# Patient Record
Sex: FEMALE | Race: WHITE | NOT HISPANIC OR LATINO | Employment: FULL TIME | URBAN - METROPOLITAN AREA
[De-identification: names, ages, dates, MRNs, and addresses within clinical notes are randomized per-mention and may not be internally consistent; named-entity substitution may affect disease eponyms.]

---

## 2017-01-06 ENCOUNTER — LAB VISIT (OUTPATIENT)
Dept: LAB | Facility: HOSPITAL | Age: 67
End: 2017-01-06
Attending: FAMILY MEDICINE
Payer: MEDICARE

## 2017-01-06 ENCOUNTER — OFFICE VISIT (OUTPATIENT)
Dept: FAMILY MEDICINE | Facility: CLINIC | Age: 67
End: 2017-01-06
Payer: MEDICARE

## 2017-01-06 ENCOUNTER — TELEPHONE (OUTPATIENT)
Dept: FAMILY MEDICINE | Facility: CLINIC | Age: 67
End: 2017-01-06

## 2017-01-06 VITALS
RESPIRATION RATE: 14 BRPM | HEART RATE: 76 BPM | HEIGHT: 62 IN | BODY MASS INDEX: 35.78 KG/M2 | SYSTOLIC BLOOD PRESSURE: 128 MMHG | WEIGHT: 194.44 LBS | DIASTOLIC BLOOD PRESSURE: 80 MMHG

## 2017-01-06 DIAGNOSIS — I10 ESSENTIAL HYPERTENSION: Primary | ICD-10-CM

## 2017-01-06 DIAGNOSIS — Z12.31 ENCOUNTER FOR SCREENING MAMMOGRAM FOR MALIGNANT NEOPLASM OF BREAST: ICD-10-CM

## 2017-01-06 DIAGNOSIS — I10 ESSENTIAL HYPERTENSION: ICD-10-CM

## 2017-01-06 DIAGNOSIS — Z11.59 NEED FOR HEPATITIS C SCREENING TEST: ICD-10-CM

## 2017-01-06 DIAGNOSIS — Z12.39 BREAST CANCER SCREENING: ICD-10-CM

## 2017-01-06 DIAGNOSIS — M89.9 DISORDER OF BONE: ICD-10-CM

## 2017-01-06 DIAGNOSIS — E78.5 HYPERLIPIDEMIA, UNSPECIFIED HYPERLIPIDEMIA TYPE: ICD-10-CM

## 2017-01-06 DIAGNOSIS — Z12.11 COLON CANCER SCREENING: ICD-10-CM

## 2017-01-06 DIAGNOSIS — Z13.820 OSTEOPOROSIS SCREENING: ICD-10-CM

## 2017-01-06 LAB
ALBUMIN SERPL BCP-MCNC: 4.4 G/DL
ALP SERPL-CCNC: 71 U/L
ALT SERPL W/O P-5'-P-CCNC: 22 U/L
ANION GAP SERPL CALC-SCNC: 9 MMOL/L
AST SERPL-CCNC: 24 U/L
BILIRUB SERPL-MCNC: 0.7 MG/DL
BUN SERPL-MCNC: 17 MG/DL
CALCIUM SERPL-MCNC: 10.3 MG/DL
CHLORIDE SERPL-SCNC: 105 MMOL/L
CHOLEST/HDLC SERPL: 2.2 {RATIO}
CO2 SERPL-SCNC: 28 MMOL/L
CREAT SERPL-MCNC: 1 MG/DL
EST. GFR  (AFRICAN AMERICAN): >60 ML/MIN/1.73 M^2
EST. GFR  (NON AFRICAN AMERICAN): 58.8 ML/MIN/1.73 M^2
GLUCOSE SERPL-MCNC: 94 MG/DL
HDL/CHOLESTEROL RATIO: 45 %
HDLC SERPL-MCNC: 191 MG/DL
HDLC SERPL-MCNC: 86 MG/DL
LDLC SERPL CALC-MCNC: 75.2 MG/DL
NONHDLC SERPL-MCNC: 105 MG/DL
POTASSIUM SERPL-SCNC: 3.9 MMOL/L
PROT SERPL-MCNC: 8.4 G/DL
SODIUM SERPL-SCNC: 142 MMOL/L
TRIGL SERPL-MCNC: 149 MG/DL
TSH SERPL DL<=0.005 MIU/L-ACNC: 3.04 UIU/ML

## 2017-01-06 PROCEDURE — 99999 PR PBB SHADOW E&M-EST. PATIENT-LVL III: CPT | Mod: PBBFAC,,, | Performed by: FAMILY MEDICINE

## 2017-01-06 PROCEDURE — 1160F RVW MEDS BY RX/DR IN RCRD: CPT | Mod: S$GLB,,, | Performed by: FAMILY MEDICINE

## 2017-01-06 PROCEDURE — 80061 LIPID PANEL: CPT

## 2017-01-06 PROCEDURE — 1157F ADVNC CARE PLAN IN RCRD: CPT | Mod: S$GLB,,, | Performed by: FAMILY MEDICINE

## 2017-01-06 PROCEDURE — 99214 OFFICE O/P EST MOD 30 MIN: CPT | Mod: S$GLB,,, | Performed by: FAMILY MEDICINE

## 2017-01-06 PROCEDURE — 36415 COLL VENOUS BLD VENIPUNCTURE: CPT | Mod: PO

## 2017-01-06 PROCEDURE — 3079F DIAST BP 80-89 MM HG: CPT | Mod: S$GLB,,, | Performed by: FAMILY MEDICINE

## 2017-01-06 PROCEDURE — 1126F AMNT PAIN NOTED NONE PRSNT: CPT | Mod: S$GLB,,, | Performed by: FAMILY MEDICINE

## 2017-01-06 PROCEDURE — 3074F SYST BP LT 130 MM HG: CPT | Mod: S$GLB,,, | Performed by: FAMILY MEDICINE

## 2017-01-06 PROCEDURE — 90662 IIV NO PRSV INCREASED AG IM: CPT | Mod: S$GLB,,, | Performed by: INTERNAL MEDICINE

## 2017-01-06 PROCEDURE — 84443 ASSAY THYROID STIM HORMONE: CPT

## 2017-01-06 PROCEDURE — 80053 COMPREHEN METABOLIC PANEL: CPT

## 2017-01-06 PROCEDURE — 1159F MED LIST DOCD IN RCRD: CPT | Mod: S$GLB,,, | Performed by: FAMILY MEDICINE

## 2017-01-06 PROCEDURE — G0008 ADMIN INFLUENZA VIRUS VAC: HCPCS | Mod: S$GLB,,, | Performed by: INTERNAL MEDICINE

## 2017-01-06 RX ORDER — LOSARTAN POTASSIUM 100 MG/1
100 TABLET ORAL DAILY
Qty: 90 TABLET | Refills: 3 | Status: SHIPPED | OUTPATIENT
Start: 2017-01-06 | End: 2018-01-12 | Stop reason: SDUPTHER

## 2017-01-06 RX ORDER — HYDROCHLOROTHIAZIDE 12.5 MG/1
12.5 CAPSULE ORAL DAILY
Qty: 90 CAPSULE | Refills: 3 | Status: SHIPPED | OUTPATIENT
Start: 2017-01-06 | End: 2018-01-12 | Stop reason: SDUPTHER

## 2017-01-06 RX ORDER — ATORVASTATIN CALCIUM 40 MG/1
40 TABLET, FILM COATED ORAL NIGHTLY
Qty: 90 TABLET | Refills: 3 | Status: SHIPPED | OUTPATIENT
Start: 2017-01-06 | End: 2017-01-13 | Stop reason: SDUPTHER

## 2017-01-06 RX ORDER — AMLODIPINE BESYLATE 5 MG/1
5 TABLET ORAL DAILY
Qty: 90 TABLET | Refills: 3 | Status: SHIPPED | OUTPATIENT
Start: 2017-01-06 | End: 2017-01-13 | Stop reason: SDUPTHER

## 2017-01-06 RX ORDER — TRAZODONE HYDROCHLORIDE 50 MG/1
50 TABLET ORAL NIGHTLY
Qty: 90 TABLET | Refills: 3 | Status: SHIPPED | OUTPATIENT
Start: 2017-01-06 | End: 2018-01-12 | Stop reason: SDUPTHER

## 2017-01-06 NOTE — MR AVS SNAPSHOT
[place] [facility] [address] [address] [place] [address]  Phone: [phone]  Fax: [phone]                  Katy Yarbrough   2017 8:00 AM   Office Visit    Description:  Female : 1950   Provider:  Bill Garvey MD   Department:  [facility]           Reason for Visit     Hyperlipidemia           Diagnoses this Visit        Comments    Essential hypertension    -  Primary     Hyperlipidemia, unspecified hyperlipidemia type         Breast cancer screening         Osteoporosis screening         Disorder of bone         Encounter for screening mammogram for malignant neoplasm of breast         Colon cancer screening                To Do List           Future Appointments        Provider Department Dept Phone    2017 9:10 AM [facility] [place] [facility] [phone]      Goals (5 Years of Data)     None       These Medications        Disp Refills Start End    atorvastatin (LIPITOR) 40 MG tablet 90 tablet 3 2017     Take 1 tablet (40 mg total) by mouth every evening. - Oral    Pharmacy: [facility] - [address] Ph #: [phone]       hydrochlorothiazide (MICROZIDE) 12.5 mg capsule 90 capsule 3 2017     Take 1 capsule (12.5 mg total) by mouth once daily. - Oral    Pharmacy: [facility] - [place] [id number] [facility] Ph #: [phone]       losartan (COZAAR) 100 MG tablet 90 tablet 3 2017     Take 1 tablet (100 mg total) by mouth once daily. - Oral    Pharmacy: [facility] - [facility] [address] [facility] Ph #: [phone]       trazodone (DESYREL) 50 MG tablet 90 tablet 3 2017     Take 1 tablet (50 mg total) by mouth every evening. - Oral    Pharmacy: [facility] - [facility] [address] [facility] Ph #: [phone]       amlodipine (NORVASC) 5 MG tablet 90 tablet 3 2017     Take 1 tablet (5 mg total)  by mouth once daily. - Oral    Pharmacy: [facility] - [facility] [address]  Ph #: [phone]         [facility]     [facility] On Call Nurse Care Line - 24/7 Assistance  Registered nurses in the [facility] On Call Center provide clinical advisement, health education, appointment booking, and other advisory services.  Call for this free service at [phone].             Medications           Message regarding Medications     Verify the changes and/or additions to your medication regime listed below are the same as discussed with your clinician today.  If any of these changes or additions are incorrect, please notify your healthcare provider.        CHANGE how you are taking these medications     Start Taking Instead of    hydrochlorothiazide (MICROZIDE) 12.5 mg capsule hydrochlorothiazide (MICROZIDE) 12.5 mg capsule    Dosage:  Take 1 capsule (12.5 mg total) by mouth once daily. Dosage:  TAKE 1 CAPSULE EVERY DAY    Reason for Change:  Reorder     losartan (COZAAR) 100 MG tablet losartan (COZAAR) 100 MG tablet    Dosage:  Take 1 tablet (100 mg total) by mouth once daily. Dosage:  TAKE 1 TABLET BY MOUTH DAILY    Reason for Change:  Reorder     trazodone (DESYREL) 50 MG tablet trazodone (DESYREL) 50 MG tablet    Dosage:  Take 1 tablet (50 mg total) by mouth every evening. Dosage:  TAKE 1 TABLET BY MOUTH EVERY EVENING    Reason for Change:  Reorder            Verify that the below list of medications is an accurate representation of the medications you are currently taking.  If none reported, the list may be blank. If incorrect, please contact your healthcare provider. Carry this list with you in case of emergency.           Current Medications     amlodipine (NORVASC) 5 MG tablet Take 1 tablet (5 mg total) by mouth once daily.    ascorbic acid (VITAMIN C) 1000 MG tablet Take 1,000 mg by mouth once daily.    atorvastatin (LIPITOR) 40 MG tablet Take 1 tablet (40 mg total) by mouth  "every evening.    hydrochlorothiazide (MICROZIDE) 12.5 mg capsule Take 1 capsule (12.5 mg total) by mouth once daily.    losartan (COZAAR) 100 MG tablet Take 1 tablet (100 mg total) by mouth once daily.    trazodone (DESYREL) 50 MG tablet Take 1 tablet (50 mg total) by mouth every evening.           Clinical Reference Information           Vital Signs - Last Recorded  Most recent update: 1/6/2017  8:23 AM by Bill Garvey MD    BP Pulse Resp Ht Wt BMI    128/80 76 14 5' 2" (1.575 m) 88.2 kg (194 lb 7.1 oz) 35.56 kg/m2      Blood Pressure          Most Recent Value    BP  128/80 [avg home reading]      Allergies as of 1/6/2017     No Known Allergies      Immunizations Administered on Date of Encounter - 1/6/2017     None      Orders Placed During Today's Visit      Normal Orders This Visit    POCT Hemocult Stool X3     Future Labs/Procedures Expected by Expires    Comprehensive metabolic panel  1/6/2017 1/7/2018    DXA Bone Density Spine And Hip  1/6/2017 1/6/2018    Lipid panel  1/6/2017 1/7/2018    Mammo Digital Screening Bilateral With CAD  1/6/2017 3/6/2018    TSH  1/6/2017 1/7/2018      "

## 2017-01-06 NOTE — PROGRESS NOTES
Subjective:     THIS DOCUMENT WAS MADE IN PART WITH [employer] DICTATION SOFTWARE.  OCCASIONALLY THIS SOFTWARE WILL MISINTERPRET WORDS OR PHRASES.     Patient ID: Katy Yarbrough is a 66 y.o. female.    Chief Complaint: Hyperlipidemia (follow up; needs lab work )    HPI   66-year-old female returns today for routine follow-up.  She has been well and has no complaints or changes.  He should've hypertension she reports at home she averaged less than 130 systolic and typically 80 or below diastolic.  She's had no headaches no chest pain.    She has a history of hyperlipidemia and remains on Lipitor.  No side effects but due for labs.    Active Ambulatory Problems     Diagnosis Date Noted    Hypertension     Hyperlipidemia     URI (upper respiratory infection) 04/25/2015     Resolved Ambulatory Problems     Diagnosis Date Noted    C. difficile diarrhea 01/09/2013     Past Medical History   Diagnosis Date    Anemia     Chronic diarrhea     Over weight      Current Outpatient Prescriptions on File Prior to Visit   Medication Sig Dispense Refill    ascorbic acid (VITAMIN C) 1000 MG tablet Take 1,000 mg by mouth once daily.      [DISCONTINUED] amlodipine (NORVASC) 5 MG tablet Take 1 tablet (5 mg total) by mouth once daily. 30 tablet 1    [DISCONTINUED] atorvastatin (LIPITOR) 40 MG tablet Take 1 tablet (40 mg total) by mouth every evening. 30 tablet 1    [DISCONTINUED] hydrochlorothiazide (MICROZIDE) 12.5 mg capsule TAKE 1 CAPSULE EVERY DAY 90 capsule 1    [DISCONTINUED] losartan (COZAAR) 100 MG tablet TAKE 1 TABLET BY MOUTH DAILY 90 tablet 1    [DISCONTINUED] trazodone (DESYREL) 50 MG tablet TAKE 1 TABLET BY MOUTH EVERY EVENING 30 tablet 1    [DISCONTINUED] hydrochlorothiazide (MICROZIDE) 12.5 mg capsule TAKE 1 CAPSULE EVERY DAY 30 capsule 1    [DISCONTINUED] losartan (COZAAR) 100 MG tablet TAKE 1 TABLET BY MOUTH DAILY 30 tablet 1     No current facility-administered medications on file prior to visit.       Review of patient's allergies indicates:  No Known Allergies  Social History   Substance Use Topics    Smoking status: Never Smoker    Smokeless tobacco: Never Used    Alcohol use None       Review of Systems   Constitutional: Negative for fatigue, fever and unexpected weight change.   HENT: Negative for sinus pressure and trouble swallowing.    Eyes: Negative for pain and visual disturbance.   Respiratory: Negative for cough, chest tightness and shortness of breath.    Cardiovascular: Negative for chest pain, palpitations and leg swelling.   Gastrointestinal: Negative for abdominal pain, blood in stool, constipation, diarrhea, nausea and vomiting.   Genitourinary: Negative for dysuria, frequency and hematuria.   Musculoskeletal: Negative for arthralgias, gait problem, myalgias and neck pain.   Skin: Negative for rash and wound.   Neurological: Positive for dizziness (occasional dizziness when looking up or down quickly). Negative for tremors, syncope, numbness and headaches.   Psychiatric/Behavioral: Negative for dysphoric mood and sleep disturbance. The patient is not nervous/anxious.        Objective:      Physical Exam   Constitutional: She is oriented to person, place, and time. She appears well-developed and well-nourished.  Non-toxic appearance. No distress.   HENT:   Head: Normocephalic and atraumatic.   Right Ear: Tympanic membrane, external ear and ear canal normal.   Left Ear: Tympanic membrane, external ear and ear canal normal.   Nose: Nose normal.   Mouth/Throat: Oropharynx is clear and moist. No oropharyngeal exudate.   Eyes: Conjunctivae and EOM are normal. Pupils are equal, round, and reactive to light. Right eye exhibits no discharge. Left eye exhibits no discharge. No scleral icterus.   Neck: Normal range of motion. Neck supple. No JVD present. No tracheal deviation present. No thyromegaly present.   Cardiovascular: Normal rate, regular rhythm, normal heart sounds and intact distal pulses.   "PMI is not displaced.  Exam reveals no gallop and no friction rub.    No murmur heard.  Pulmonary/Chest: Effort normal and breath sounds normal. No respiratory distress. She has no wheezes. She has no rales.   Abdominal: Soft. Bowel sounds are normal. She exhibits no distension and no mass. There is no tenderness. There is no rebound and no guarding.   Musculoskeletal: She exhibits no edema.   Lymphadenopathy:     She has no cervical adenopathy.   Neurological: She is alert and oriented to person, place, and time. She displays normal reflexes. No cranial nerve deficit. She exhibits normal muscle tone.   Skin: Skin is dry. No rash noted. No pallor.   Psychiatric: She has a normal mood and affect. Her behavior is normal.   Vitals reviewed.      Vitals:    01/06/17 0809 01/06/17 0823   BP: (!) 140/94 128/80   Pulse: 76    Resp: 14    Weight: 88.2 kg (194 lb 7.1 oz)    Height: 5' 2" (1.575 m)        Assessment:       1. Essential hypertension    2. Hyperlipidemia, unspecified hyperlipidemia type    3. Breast cancer screening    4. Osteoporosis screening    5. Disorder of bone     6. Encounter for screening mammogram for malignant neoplasm of breast     7. Colon cancer screening        Plan:       Katy was seen today for hyperlipidemia.    Diagnoses and all orders for this visit:    Essential hypertension  -     Comprehensive metabolic panel; Future  -     TSH; Future  -     hydrochlorothiazide (MICROZIDE) 12.5 mg capsule; Take 1 capsule (12.5 mg total) by mouth once daily.  -     losartan (COZAAR) 100 MG tablet; Take 1 tablet (100 mg total) by mouth once daily.  -     amlodipine (NORVASC) 5 MG tablet; Take 1 tablet (5 mg total) by mouth once daily.    Hyperlipidemia, unspecified hyperlipidemia type  -     TSH; Future  -     Lipid panel; Future  -     atorvastatin (LIPITOR) 40 MG tablet; Take 1 tablet (40 mg total) by mouth every evening.  I have asked her to monitor blood pressure regularly at home to make certain it " is remaining within acceptable limits.    Breast cancer screening  -     Mammo Digital Screening Bilateral With CAD; Future    Osteoporosis screening  -     DXA Bone Density Spine And Hip; Future    Disorder of bone   -     DXA Bone Density Spine And Hip; Future    Encounter for screening mammogram for malignant neoplasm of breast   -     Mammo Digital Screening Bilateral With CAD; Future    Colon cancer screening  -     POCT Hemocult Stool X3    Other orders  -     trazodone (DESYREL) 50 MG tablet; Take 1 tablet (50 mg total) by mouth every evening.

## 2017-01-06 NOTE — TELEPHONE ENCOUNTER
Tried to call mrs salinas she needs to come back to get blood drawn for hepatitis c. No answer.   She had blood drawn this am but did not get the tube drawn for hep C

## 2017-01-11 ENCOUNTER — LAB VISIT (OUTPATIENT)
Dept: LAB | Facility: HOSPITAL | Age: 67
End: 2017-01-11
Attending: FAMILY MEDICINE
Payer: MEDICARE

## 2017-01-11 DIAGNOSIS — Z11.59 NEED FOR HEPATITIS C SCREENING TEST: ICD-10-CM

## 2017-01-11 PROCEDURE — 86803 HEPATITIS C AB TEST: CPT

## 2017-01-11 PROCEDURE — 36415 COLL VENOUS BLD VENIPUNCTURE: CPT | Mod: PO

## 2017-01-12 LAB — HCV AB SERPL QL IA: NEGATIVE

## 2017-01-13 ENCOUNTER — TELEPHONE (OUTPATIENT)
Dept: FAMILY MEDICINE | Facility: CLINIC | Age: 67
End: 2017-01-13

## 2017-01-13 DIAGNOSIS — E78.5 HYPERLIPIDEMIA, UNSPECIFIED HYPERLIPIDEMIA TYPE: ICD-10-CM

## 2017-01-13 DIAGNOSIS — I10 ESSENTIAL HYPERTENSION: ICD-10-CM

## 2017-01-13 RX ORDER — ATORVASTATIN CALCIUM 40 MG/1
40 TABLET, FILM COATED ORAL NIGHTLY
Qty: 10 TABLET | Refills: 1 | Status: SHIPPED | OUTPATIENT
Start: 2017-01-13 | End: 2018-01-12 | Stop reason: SDUPTHER

## 2017-01-13 RX ORDER — AMLODIPINE BESYLATE 5 MG/1
5 TABLET ORAL DAILY
Qty: 10 TABLET | Refills: 1 | Status: SHIPPED | OUTPATIENT
Start: 2017-01-13 | End: 2018-01-12 | Stop reason: SDUPTHER

## 2017-01-13 NOTE — TELEPHONE ENCOUNTER
----- Message from Sean Mejia sent at 1/13/2017  3:18 PM CST -----  Contact: same  Patient called in and stated she was speaking to nurse earlier and forgot to ask them something.    Patient call back number is [phone]

## 2017-01-13 NOTE — TELEPHONE ENCOUNTER
Spoke with pt she only has two pills left of  her meds can we bridge these for her at local pharmacy 10 days worth to [facility].

## 2017-01-13 NOTE — TELEPHONE ENCOUNTER
----- Message from Marichuy Corado sent at 1/13/2017  2:41 PM CST -----  Contact: self [phone]  Patient is requesting a call back from the nurse stated she out of 2 prescriptions.    Please call the patient upon request at phone number [phone].

## 2017-07-21 ENCOUNTER — OFFICE VISIT (OUTPATIENT)
Dept: URGENT CARE | Facility: CLINIC | Age: 67
End: 2017-07-21
Payer: MEDICARE

## 2017-07-21 VITALS
OXYGEN SATURATION: 98 % | HEART RATE: 75 BPM | WEIGHT: 192 LBS | HEIGHT: 62 IN | SYSTOLIC BLOOD PRESSURE: 178 MMHG | TEMPERATURE: 98 F | DIASTOLIC BLOOD PRESSURE: 86 MMHG | RESPIRATION RATE: 17 BRPM | BODY MASS INDEX: 35.33 KG/M2

## 2017-07-21 DIAGNOSIS — S91.051A CAT BITE OF ANKLE, RIGHT, INITIAL ENCOUNTER: Primary | ICD-10-CM
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
## 2017-07-21 PROCEDURE — 99203 OFFICE O/P NEW LOW 30 MIN: CPT | Mod: S$GLB,,, | Performed by: FAMILY MEDICINE

## 2017-07-21 PROCEDURE — 1159F MED LIST DOCD IN RCRD: CPT | Mod: S$GLB,,, | Performed by: FAMILY MEDICINE

## 2017-07-21 RX ORDER — MUPIROCIN 20 MG/G
OINTMENT TOPICAL
Qty: 22 G | Refills: 1 | Status: SHIPPED | OUTPATIENT
Start: 2017-07-21 | End: 2019-02-07

## 2017-07-21 RX ORDER — AMOXICILLIN AND CLAVULANATE POTASSIUM 875; 125 MG/1; MG/1
1 TABLET, FILM COATED ORAL 2 TIMES DAILY
Qty: 20 TABLET | Refills: 0 | Status: SHIPPED | OUTPATIENT
Start: 2017-07-21 | End: 2017-07-31

## 2017-07-21 NOTE — PROGRESS NOTES
"Subjective:       Patient ID: Katy Yarbrough is a 67 y.o. female.    Vitals:  height is 5' 2" (1.575 m) and weight is 87.1 kg (192 lb). Her oral temperature is 98.1 °F (36.7 °C). Her blood pressure is 178/86 (abnormal) and her pulse is 75. Her respiration is 17 and oxygen saturation is 98%.     Chief Complaint: Animal Bite (pt. states her cat bit her last night on her rt. ankle)    Animal Bite    The incident occurred at home. There is an injury to the right ankle. The pain is mild. Pertinent negatives include no chest pain, no numbness, no abdominal pain, no nausea, no vomiting, no headaches and no cough. There have been no prior injuries to these areas. Her tetanus status is UTD.     Review of Systems   Constitution: Negative for chills and fever.   HENT: Negative for headaches and sore throat.    Eyes: Negative for blurred vision.   Cardiovascular: Negative for chest pain.   Respiratory: Negative for cough and shortness of breath.    Skin: Negative for rash.   Musculoskeletal: Negative for back pain and joint pain.   Gastrointestinal: Negative for abdominal pain, diarrhea, nausea and vomiting.   Neurological: Negative for numbness.   Psychiatric/Behavioral: The patient is not nervous/anxious.        Objective:      Physical Exam   Constitutional: She is oriented to person, place, and time. She appears well-developed and well-nourished.   HENT:   Head: Normocephalic and atraumatic. Head is without abrasion, without contusion and without laceration.   Nose: Nose normal.   Mouth/Throat: Oropharynx is clear and moist.   Eyes: Conjunctivae, EOM and lids are normal. Pupils are equal, round, and reactive to light.   Neck: Trachea normal, full passive range of motion without pain and phonation normal. Neck supple.   Cardiovascular: Normal rate.    Pulmonary/Chest: No stridor. No respiratory distress.   Musculoskeletal: Normal range of motion.   Neurological: She is alert and oriented to person, place, and time.   Skin: " Skin is warm, dry and intact. Capillary refill takes less than 2 seconds. No abrasion, no bruising, no burn, no ecchymosis, no laceration, no lesion and no rash noted. There is erythema (bite elvis to right upper ankle. slight erythema).   Psychiatric: She has a normal mood and affect. Her speech is normal and behavior is normal. Judgment and thought content normal. Cognition and memory are normal.   Nursing note and vitals reviewed.      Assessment:       1. Cat bite of ankle, right, initial encounter        Plan:         Cat bite of ankle, right, initial encounter    Other orders  -     amoxicillin-clavulanate 875-125mg (AUGMENTIN) 875-125 mg per tablet; Take 1 tablet by mouth 2 (two) times daily.  Dispense: 20 tablet; Refill: 0  -     mupirocin (BACTROBAN) 2 % ointment; Apply to affected area 3 times daily  Dispense: 22 g; Refill: 1

## 2017-11-10 DIAGNOSIS — Z12.11 COLON CANCER SCREENING: ICD-10-CM

## 2018-01-12 DIAGNOSIS — I10 ESSENTIAL HYPERTENSION: ICD-10-CM

## 2018-01-12 DIAGNOSIS — E78.5 HYPERLIPIDEMIA, UNSPECIFIED HYPERLIPIDEMIA TYPE: ICD-10-CM

## 2018-01-12 RX ORDER — HYDROCHLOROTHIAZIDE 12.5 MG/1
CAPSULE ORAL
Qty: 90 CAPSULE | Refills: 0 | Status: SHIPPED | OUTPATIENT
Start: 2018-01-12 | End: 2018-01-25 | Stop reason: RX

## 2018-01-12 RX ORDER — AMLODIPINE BESYLATE 5 MG/1
TABLET ORAL
Qty: 90 TABLET | Refills: 0 | Status: SHIPPED | OUTPATIENT
Start: 2018-01-12 | End: 2018-01-25 | Stop reason: RX

## 2018-01-12 RX ORDER — TRAZODONE HYDROCHLORIDE 50 MG/1
TABLET ORAL
Qty: 90 TABLET | Refills: 0 | Status: SHIPPED | OUTPATIENT
Start: 2018-01-12 | End: 2018-04-19 | Stop reason: SDUPTHER

## 2018-01-12 RX ORDER — LOSARTAN POTASSIUM 100 MG/1
TABLET ORAL
Qty: 90 TABLET | Refills: 0 | Status: SHIPPED | OUTPATIENT
Start: 2018-01-12 | End: 2018-01-25 | Stop reason: RX

## 2018-01-12 RX ORDER — ATORVASTATIN CALCIUM 40 MG/1
TABLET, FILM COATED ORAL
Qty: 90 TABLET | Refills: 0 | Status: SHIPPED | OUTPATIENT
Start: 2018-01-12 | End: 2018-01-25 | Stop reason: RX

## 2018-01-25 DIAGNOSIS — I10 ESSENTIAL HYPERTENSION: ICD-10-CM

## 2018-01-25 DIAGNOSIS — E78.5 HYPERLIPIDEMIA, UNSPECIFIED HYPERLIPIDEMIA TYPE: ICD-10-CM

## 2018-01-25 RX ORDER — AMLODIPINE BESYLATE 5 MG/1
5 TABLET ORAL DAILY
Qty: 10 TABLET | Refills: 0 | Status: SHIPPED | OUTPATIENT
Start: 2018-01-25 | End: 2018-04-19 | Stop reason: SDUPTHER

## 2018-01-25 RX ORDER — HYDROCHLOROTHIAZIDE 12.5 MG/1
12.5 CAPSULE ORAL DAILY
Qty: 10 CAPSULE | Refills: 0 | Status: SHIPPED | OUTPATIENT
Start: 2018-01-25 | End: 2018-04-19 | Stop reason: SDUPTHER

## 2018-01-25 RX ORDER — LOSARTAN POTASSIUM 100 MG/1
TABLET ORAL
Qty: 10 TABLET | Refills: 0 | Status: SHIPPED | OUTPATIENT
Start: 2018-01-25 | End: 2018-04-19 | Stop reason: SDUPTHER

## 2018-01-25 RX ORDER — ATORVASTATIN CALCIUM 40 MG/1
40 TABLET, FILM COATED ORAL NIGHTLY
Qty: 10 TABLET | Refills: 0 | Status: SHIPPED | OUTPATIENT
Start: 2018-01-25 | End: 2018-04-19 | Stop reason: SDUPTHER

## 2018-01-25 NOTE — TELEPHONE ENCOUNTER
Pts mail order Rx have not arrived, requesting 10 day supply to [facility] pharmacy     LOV: 1/6/2017

## 2018-01-25 NOTE — TELEPHONE ENCOUNTER
----- Message from Rubi Marsh sent at 1/25/2018  8:28 AM CST -----  Contact: self [phone]  Her prescriptions from the mail in pharmacy have not arrived and she is out of Amlodipine 5 mg , Atorvastatin 40 mg, losartan 100 mg and hydrochlorothiazide 12.5 mg. Please order a 10 day supplies of them from:   [facility]/pharmacy #[id number] - [place] - [address] [address]  [address] [place] [address]  Phone: [phone] Fax: [phone]  Thank you!

## 2018-04-19 DIAGNOSIS — E78.5 HYPERLIPIDEMIA, UNSPECIFIED HYPERLIPIDEMIA TYPE: ICD-10-CM

## 2018-04-19 DIAGNOSIS — I10 ESSENTIAL HYPERTENSION: ICD-10-CM

## 2018-04-19 RX ORDER — LOSARTAN POTASSIUM 100 MG/1
TABLET ORAL
Qty: 90 TABLET | Refills: 0 | Status: SHIPPED | OUTPATIENT
Start: 2018-04-19 | End: 2018-07-18 | Stop reason: SDUPTHER

## 2018-04-19 RX ORDER — AMLODIPINE BESYLATE 5 MG/1
TABLET ORAL
Qty: 90 TABLET | Refills: 0 | Status: SHIPPED | OUTPATIENT
Start: 2018-04-19 | End: 2018-07-18 | Stop reason: SDUPTHER

## 2018-04-19 RX ORDER — TRAZODONE HYDROCHLORIDE 50 MG/1
TABLET ORAL
Qty: 90 TABLET | Refills: 0 | Status: SHIPPED | OUTPATIENT
Start: 2018-04-19 | End: 2018-07-18 | Stop reason: SDUPTHER

## 2018-04-19 RX ORDER — ATORVASTATIN CALCIUM 40 MG/1
TABLET, FILM COATED ORAL
Qty: 90 TABLET | Refills: 0 | Status: SHIPPED | OUTPATIENT
Start: 2018-04-19 | End: 2018-07-18 | Stop reason: SDUPTHER

## 2018-04-23 RX ORDER — HYDROCHLOROTHIAZIDE 12.5 MG/1
CAPSULE ORAL
Qty: 90 CAPSULE | Refills: 0 | Status: SHIPPED | OUTPATIENT
Start: 2018-04-23 | End: 2018-07-18 | Stop reason: SDUPTHER

## 2018-05-17 ENCOUNTER — LAB VISIT (OUTPATIENT)
Dept: LAB | Facility: HOSPITAL | Age: 68
End: 2018-05-17
Attending: FAMILY MEDICINE
Payer: MEDICARE

## 2018-05-17 DIAGNOSIS — E78.5 HYPERLIPIDEMIA, UNSPECIFIED HYPERLIPIDEMIA TYPE: ICD-10-CM

## 2018-05-17 DIAGNOSIS — I10 ESSENTIAL HYPERTENSION: ICD-10-CM

## 2018-05-17 LAB
ALBUMIN SERPL BCP-MCNC: 3.7 G/DL
ALP SERPL-CCNC: 62 U/L
ALT SERPL W/O P-5'-P-CCNC: 17 U/L
ANION GAP SERPL CALC-SCNC: 12 MMOL/L
AST SERPL-CCNC: 18 U/L
BASOPHILS # BLD AUTO: 0.03 K/UL
BASOPHILS NFR BLD: 0.4 %
BILIRUB SERPL-MCNC: 0.7 MG/DL
BUN SERPL-MCNC: 16 MG/DL
CALCIUM SERPL-MCNC: 9.7 MG/DL
CHLORIDE SERPL-SCNC: 106 MMOL/L
CHOLEST SERPL-MCNC: 153 MG/DL
CHOLEST/HDLC SERPL: 2.4 {RATIO}
CO2 SERPL-SCNC: 25 MMOL/L
CREAT SERPL-MCNC: 0.8 MG/DL
DIFFERENTIAL METHOD: NORMAL
EOSINOPHIL # BLD AUTO: 0.2 K/UL
EOSINOPHIL NFR BLD: 2.5 %
ERYTHROCYTE [DISTWIDTH] IN BLOOD BY AUTOMATED COUNT: 12.7 %
EST. GFR  (AFRICAN AMERICAN): >60 ML/MIN/1.73 M^2
EST. GFR  (NON AFRICAN AMERICAN): >60 ML/MIN/1.73 M^2
GLUCOSE SERPL-MCNC: 93 MG/DL
HCT VFR BLD AUTO: 43.8 %
HDLC SERPL-MCNC: 64 MG/DL
HDLC SERPL: 41.8 %
HGB BLD-MCNC: 14.6 G/DL
IMM GRANULOCYTES # BLD AUTO: 0.01 K/UL
IMM GRANULOCYTES NFR BLD AUTO: 0.1 %
LDLC SERPL CALC-MCNC: 70.4 MG/DL
LYMPHOCYTES # BLD AUTO: 1.9 K/UL
LYMPHOCYTES NFR BLD: 27.6 %
MCH RBC QN AUTO: 30.7 PG
MCHC RBC AUTO-ENTMCNC: 33.3 G/DL
MCV RBC AUTO: 92 FL
MONOCYTES # BLD AUTO: 0.5 K/UL
MONOCYTES NFR BLD: 7.8 %
NEUTROPHILS # BLD AUTO: 4.3 K/UL
NEUTROPHILS NFR BLD: 61.6 %
NONHDLC SERPL-MCNC: 89 MG/DL
NRBC BLD-RTO: 0 /100 WBC
PLATELET # BLD AUTO: 240 K/UL
PMV BLD AUTO: 11.2 FL
POTASSIUM SERPL-SCNC: 3.4 MMOL/L
PROT SERPL-MCNC: 6.9 G/DL
RBC # BLD AUTO: 4.75 M/UL
SODIUM SERPL-SCNC: 143 MMOL/L
TRIGL SERPL-MCNC: 93 MG/DL
TSH SERPL DL<=0.005 MIU/L-ACNC: 2.38 UIU/ML
WBC # BLD AUTO: 6.92 K/UL

## 2018-05-17 PROCEDURE — 84443 ASSAY THYROID STIM HORMONE: CPT

## 2018-05-17 PROCEDURE — 80061 LIPID PANEL: CPT

## 2018-05-17 PROCEDURE — 36415 COLL VENOUS BLD VENIPUNCTURE: CPT | Mod: PN

## 2018-05-17 PROCEDURE — 85025 COMPLETE CBC W/AUTO DIFF WBC: CPT

## 2018-05-17 PROCEDURE — 80053 COMPREHEN METABOLIC PANEL: CPT

## 2018-05-24 ENCOUNTER — OFFICE VISIT (OUTPATIENT)
Dept: FAMILY MEDICINE | Facility: CLINIC | Age: 68
End: 2018-05-24
Payer: MEDICARE

## 2018-05-24 VITALS
SYSTOLIC BLOOD PRESSURE: 136 MMHG | BODY MASS INDEX: 35.28 KG/M2 | OXYGEN SATURATION: 98 % | DIASTOLIC BLOOD PRESSURE: 84 MMHG | HEIGHT: 62 IN | TEMPERATURE: 98 F | HEART RATE: 72 BPM | WEIGHT: 191.69 LBS

## 2018-05-24 DIAGNOSIS — E78.5 HYPERLIPIDEMIA, UNSPECIFIED HYPERLIPIDEMIA TYPE: ICD-10-CM

## 2018-05-24 DIAGNOSIS — I10 ESSENTIAL HYPERTENSION: Primary | ICD-10-CM

## 2018-05-24 DIAGNOSIS — Z12.11 COLON CANCER SCREENING: ICD-10-CM

## 2018-05-24 PROCEDURE — 99499 UNLISTED E&M SERVICE: CPT | Mod: S$GLB,,, | Performed by: FAMILY MEDICINE

## 2018-05-24 PROCEDURE — 99214 OFFICE O/P EST MOD 30 MIN: CPT | Mod: S$GLB,,, | Performed by: FAMILY MEDICINE

## 2018-05-24 PROCEDURE — 3075F SYST BP GE 130 - 139MM HG: CPT | Mod: CPTII,S$GLB,, | Performed by: FAMILY MEDICINE

## 2018-05-24 PROCEDURE — 99999 PR PBB SHADOW E&M-EST. PATIENT-LVL III: CPT | Mod: PBBFAC,,, | Performed by: FAMILY MEDICINE

## 2018-05-24 PROCEDURE — 3079F DIAST BP 80-89 MM HG: CPT | Mod: CPTII,S$GLB,, | Performed by: FAMILY MEDICINE

## 2018-05-24 RX ORDER — CHOLECALCIFEROL (VITAMIN D3) 125 MCG
CAPSULE ORAL DAILY PRN
COMMUNITY
Start: 2018-04-26

## 2018-05-24 RX ORDER — MAGNESIUM 200 MG
TABLET ORAL
COMMUNITY
Start: 2018-04-26

## 2018-05-24 NOTE — PROGRESS NOTES
Subjective:     THIS DOCUMENT WAS MADE IN PART WITH [employer] DICTATION SOFTWARE. OCCASIONALLY THIS SOFTWARE MAY MISINTERPRET WORDS OR PHRASES.     Patient ID: Katy Yarbrough is a 67 y.o. female.    Chief Complaint: Annual Exam    Hypertension   This is a chronic problem. The current episode started more than 1 year ago. The problem has been gradually improving since onset. The problem is controlled. Pertinent negatives include no blurred vision, chest pain, headaches, malaise/fatigue, peripheral edema or shortness of breath. There are no associated agents to hypertension. Risk factors for coronary artery disease include dyslipidemia. Past treatments include calcium channel blockers and diuretics. The current treatment provides significant improvement. There are no compliance problems.    Hyperlipidemia   This is a chronic problem. The current episode started more than 1 year ago. The problem is controlled. Recent lipid tests were reviewed and are normal. Pertinent negatives include no chest pain, myalgias or shortness of breath. Current antihyperlipidemic treatment includes statins. The current treatment provides significant improvement of lipids. There are no compliance problems.  Risk factors for coronary artery disease include hypertension.      Active Ambulatory Problems     Diagnosis Date Noted    Hypertension     Hyperlipidemia     URI (upper respiratory infection) 04/25/2015     Resolved Ambulatory Problems     Diagnosis Date Noted    C. difficile diarrhea 01/09/2013     Past Medical History:   Diagnosis Date    Anemia     Chronic diarrhea     Hyperlipidemia     Hypertension     Over weight      Current Outpatient Prescriptions on File Prior to Visit   Medication Sig Dispense Refill    amLODIPine (NORVASC) 5 MG tablet TAKE 1 TABLET EVERY DAY 90 tablet 0    ascorbic acid (VITAMIN C) 1000 MG tablet Take 1,000 mg by mouth once daily.      atorvastatin (LIPITOR) 40 MG tablet TAKE 1 TABLET EVERY EVENING  90 tablet 0    hydroCHLOROthiazide (MICROZIDE) 12.5 mg capsule TAKE 1 CAPSULE EVERY DAY 90 capsule 0    losartan (COZAAR) 100 MG tablet TAKE 1 TABLET EVERY DAY 90 tablet 0    mupirocin (BACTROBAN) 2 % ointment Apply to affected area 3 times daily 22 g 1    traZODone (DESYREL) 50 MG tablet TAKE 1 TABLET EVERY EVENING 90 tablet 0     No current facility-administered medications on file prior to visit.          Review of Systems   Constitutional: Negative for malaise/fatigue.   Eyes: Negative for blurred vision.   Respiratory: Negative for shortness of breath.    Cardiovascular: Negative for chest pain.   Musculoskeletal: Negative for myalgias.   Neurological: Negative for headaches.       Objective:      Physical Exam   Constitutional: She is oriented to person, place, and time. She appears well-developed and well-nourished.  Non-toxic appearance. No distress.   HENT:   Head: Normocephalic and atraumatic.   Right Ear: Tympanic membrane, external ear and ear canal normal.   Left Ear: Tympanic membrane, external ear and ear canal normal.   Nose: Nose normal.   Mouth/Throat: Oropharynx is clear and moist. No oropharyngeal exudate.   Eyes: Conjunctivae and EOM are normal. Pupils are equal, round, and reactive to light. Right eye exhibits no discharge. Left eye exhibits no discharge. No scleral icterus.   Neck: Normal range of motion. Neck supple. No JVD present. No thyromegaly present.   Cardiovascular: Normal rate, regular rhythm, normal heart sounds and intact distal pulses.  PMI is not displaced.  Exam reveals no gallop and no friction rub.    No murmur heard.  Pulmonary/Chest: Effort normal and breath sounds normal. No respiratory distress. She has no wheezes. She has no rales.   Abdominal: Soft. Bowel sounds are normal. She exhibits no distension and no mass. There is no tenderness. There is no rebound and no guarding.   Musculoskeletal: She exhibits edema.   Lymphadenopathy:     She has no cervical adenopathy.    Neurological: She is alert and oriented to person, place, and time. She displays normal reflexes. No cranial nerve deficit. She exhibits normal muscle tone.   Psychiatric: She has a normal mood and affect. Her behavior is normal.   Vitals reviewed.      Assessment:       1. Essential hypertension    2. Hyperlipidemia, unspecified hyperlipidemia type    3. Colon cancer screening        Plan:       Katy was seen today for annual exam.    Diagnoses and all orders for this visit:    Essential hypertension   Stable control to continue current medications  Hyperlipidemia, unspecified hyperlipidemia type   stable and control.  Colon cancer screening  -     Fecal Immunochemical Test (iFOBT); Future   she declines colonoscopy but is agreeable for stool testing.     She refuses all other preventative healthcare measures including vaccinations etc. And I did discuss the indication recommendations, etc.         Declines colonoscopy, agreeable to ifobt    Lesion nose mostly bening, she feel getting smaller

## 2018-07-11 ENCOUNTER — PES CALL (OUTPATIENT)
Dept: ADMINISTRATIVE | Facility: CLINIC | Age: 68
End: 2018-07-11

## 2018-07-18 DIAGNOSIS — I10 ESSENTIAL HYPERTENSION: ICD-10-CM

## 2018-07-18 DIAGNOSIS — E78.5 HYPERLIPIDEMIA, UNSPECIFIED HYPERLIPIDEMIA TYPE: ICD-10-CM

## 2018-07-18 NOTE — PROGRESS NOTES
Refill Authorization Note     is requesting a refill authorization.    Brief assessment and rationale for refill: DEFER: Trazodone (insomnia); APPROVE: Amlodipine/Losartan/Atorvastatin/HCTZ  Amount/Quantity of medication ordered: 90d        Refills Authorized: Yes              Medication Therapy Plan: Labs WNL; All meds stated as controlled; defer trazodone to you, not on protocol; approve 12 more on amlodipine/losartan/atorvastatin and 6 more on HCTZ  Name and strength of medication: Amlodipine 5mg/Losartan 100mg/Atorvastatin 40mg/HCTZ 12.5mg  How patient will take medication: utd  Medication reconciliation completed: No  Comments:   BP Readings from Last 3 Encounters:   05/24/18 136/84   07/21/17 (!) 178/86   01/06/17 128/80     Lab Results   Component Value Date    CREATININE 0.8 05/17/2018    BUN 16 05/17/2018     05/17/2018    K 3.4 (L) 05/17/2018     05/17/2018    CO2 25 05/17/2018     Pulse Readings from Last 3 Encounters:   05/24/18 72   07/21/17 75   01/06/17 76     Lab Results   Component Value Date    ALT 17 05/17/2018    AST 18 05/17/2018    ALKPHOS 62 05/17/2018    BILITOT 0.7 05/17/2018     Lab Results   Component Value Date    CHOL 153 05/17/2018    CHOL 191 01/06/2017    CHOL 186 01/07/2015     Lab Results   Component Value Date    HDL 64 05/17/2018    HDL 86 (H) 01/06/2017    HDL 71 01/07/2015     Lab Results   Component Value Date    LDLCALC 70.4 05/17/2018    LDLCALC 75.2 01/06/2017    LDLCALC 84.8 01/07/2015     Lab Results   Component Value Date    TRIG 93 05/17/2018    TRIG 149 01/06/2017    TRIG 151 (H) 01/07/2015     Lab Results   Component Value Date    CHOLHDL 41.8 05/17/2018    CHOLHDL 45.0 01/06/2017    CHOLHDL 38.2 01/07/2015

## 2018-07-19 RX ORDER — LOSARTAN POTASSIUM 100 MG/1
TABLET ORAL
Qty: 90 TABLET | Refills: 3 | Status: SHIPPED | OUTPATIENT
Start: 2018-07-19 | End: 2019-07-03 | Stop reason: SDUPTHER

## 2018-07-19 RX ORDER — ATORVASTATIN CALCIUM 40 MG/1
TABLET, FILM COATED ORAL
Qty: 90 TABLET | Refills: 3 | Status: SHIPPED | OUTPATIENT
Start: 2018-07-19 | End: 2019-07-03 | Stop reason: SDUPTHER

## 2018-07-19 RX ORDER — AMLODIPINE BESYLATE 5 MG/1
TABLET ORAL
Qty: 90 TABLET | Refills: 3 | Status: SHIPPED | OUTPATIENT
Start: 2018-07-19 | End: 2019-07-03 | Stop reason: SDUPTHER

## 2018-07-19 RX ORDER — TRAZODONE HYDROCHLORIDE 50 MG/1
TABLET ORAL
Qty: 90 TABLET | Refills: 1 | Status: SHIPPED | OUTPATIENT
Start: 2018-07-19 | End: 2019-01-19 | Stop reason: SDUPTHER

## 2018-07-19 RX ORDER — HYDROCHLOROTHIAZIDE 12.5 MG/1
CAPSULE ORAL
Qty: 90 CAPSULE | Refills: 1 | Status: SHIPPED | OUTPATIENT
Start: 2018-07-19 | End: 2019-01-19 | Stop reason: SDUPTHER

## 2018-09-24 NOTE — TELEPHONE ENCOUNTER
One refill authorized, patient is overdue for labs and follow-up. Needs to schedule before I'll authorize any future refills   ----- Message from Fercho Serrano MD sent at 9/23/2018  4:31 PM CDT -----  Glycohemoglobin is a bit higher at 7.1--sugars average 157 over last 2 months--follow diet and lose weight; normal psa; lipid panel has low levels of good cholesterol and slightly high triglycerides--follow diet--go over numbers with patient;

## 2018-12-04 ENCOUNTER — OFFICE VISIT (OUTPATIENT)
Dept: FAMILY MEDICINE | Facility: CLINIC | Age: 68
End: 2018-12-04
Payer: MEDICARE

## 2018-12-04 VITALS
RESPIRATION RATE: 16 BRPM | HEIGHT: 62 IN | TEMPERATURE: 98 F | DIASTOLIC BLOOD PRESSURE: 80 MMHG | WEIGHT: 159.75 LBS | BODY MASS INDEX: 29.4 KG/M2 | OXYGEN SATURATION: 98 % | SYSTOLIC BLOOD PRESSURE: 130 MMHG | HEART RATE: 72 BPM

## 2018-12-04 DIAGNOSIS — D48.5 NEOPLASM OF UNCERTAIN BEHAVIOR OF SKIN OF NOSE: ICD-10-CM

## 2018-12-04 DIAGNOSIS — I10 ESSENTIAL HYPERTENSION: Primary | ICD-10-CM

## 2018-12-04 DIAGNOSIS — E78.5 HYPERLIPIDEMIA, UNSPECIFIED HYPERLIPIDEMIA TYPE: ICD-10-CM

## 2018-12-04 PROCEDURE — 99214 OFFICE O/P EST MOD 30 MIN: CPT | Mod: HCWC,25,S$GLB, | Performed by: FAMILY MEDICINE

## 2018-12-04 PROCEDURE — G0008 ADMIN INFLUENZA VIRUS VAC: HCPCS | Mod: HCWC,S$GLB,, | Performed by: FAMILY MEDICINE

## 2018-12-04 PROCEDURE — 1101F PT FALLS ASSESS-DOCD LE1/YR: CPT | Mod: CPTII,HCWC,S$GLB, | Performed by: FAMILY MEDICINE

## 2018-12-04 PROCEDURE — 3075F SYST BP GE 130 - 139MM HG: CPT | Mod: CPTII,HCWC,S$GLB, | Performed by: FAMILY MEDICINE

## 2018-12-04 PROCEDURE — 3079F DIAST BP 80-89 MM HG: CPT | Mod: CPTII,HCWC,S$GLB, | Performed by: FAMILY MEDICINE

## 2018-12-04 PROCEDURE — 90662 IIV NO PRSV INCREASED AG IM: CPT | Mod: HCWC,S$GLB,, | Performed by: FAMILY MEDICINE

## 2018-12-04 PROCEDURE — 99999 PR PBB SHADOW E&M-EST. PATIENT-LVL IV: CPT | Mod: PBBFAC,HCWC,, | Performed by: FAMILY MEDICINE

## 2018-12-04 NOTE — PROGRESS NOTES
THIS DOCUMENT WAS MADE IN PART WITH VOICE RECOGNITION SOFTWARE.  OCCASIONALLY THIS SOFTWARE WILL MISINTERPRET WORDS OR PHRASES.      Katy Yarbrough  1950    Katy was seen today for follow-up and hypertension.    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 She reports she is well. Reports her blood pressure at home typically averages less than 130/80 and tends to run high here. Initial reading was mildly elevated, repeat is better. She denies any chest pain headaches or shortness of breath. She does state that she has been eating healthier and has lost some weight intentionally.    She does have a history of hyperlipidemia that has been stable on atorvastatin. No concerns reported.\    She also has a lesion on her nose. She has not followed with dermatology but is agreeable to let us help her schedule today    HPI elements addressed above in the assessment and plan including problems, diagnosis, stability/instability,  improving/worsening, and chronicity will not be duplicated in this section. Any important additional HPI topics will be discussed here if needed.    Active Ambulatory Problems     Diagnosis Date Noted    Hypertension     Hyperlipidemia     URI (upper respiratory infection) 04/25/2015     Resolved Ambulatory Problems     Diagnosis Date Noted    C. difficile diarrhea  01/09/2013     Past Medical History:   Diagnosis Date    Anemia     Chronic diarrhea     Hyperlipidemia     Hypertension     Over weight        Review of Systems   Constitutional: Negative for activity change and diaphoresis.   HENT: Negative.    Respiratory: Negative for shortness of breath.    Cardiovascular: Negative for chest pain.   Musculoskeletal: Negative for myalgias.   Neurological: Negative for dizziness, tremors, weakness and headaches.       Objective     Physical Exam   Constitutional: She is oriented to person, place, and time. She appears well-developed and well-nourished.  Non-toxic appearance. No distress.   HENT:   Head: Normocephalic and atraumatic.       Right Ear: Tympanic membrane, external ear and ear canal normal.   Left Ear: Tympanic membrane, external ear and ear canal normal.   Nose: Nose normal.   Mouth/Throat: Oropharynx is clear and moist. No oropharyngeal exudate.   Eyes: Conjunctivae and EOM are normal. Pupils are equal, round, and reactive to light. Right eye exhibits no discharge. Left eye exhibits no discharge. No scleral icterus.   Neck: Normal range of motion. Neck supple. No JVD present.   Cardiovascular: Normal rate, regular rhythm, normal heart sounds and intact distal pulses. PMI is not displaced. Exam reveals no gallop and no friction rub.   No murmur heard.  Pulmonary/Chest: Effort normal and breath sounds normal. No respiratory distress. She has no wheezes. She has no rales.   Abdominal: Soft. Bowel sounds are normal. She exhibits no distension and no mass. There is no tenderness. There is no rebound and no guarding.   Lymphadenopathy:     She has no cervical adenopathy.   Neurological: She is alert and oriented to person, place, and time. She displays normal reflexes. No cranial nerve deficit. She exhibits normal muscle tone.   Psychiatric: She has a normal mood and affect. Her behavior is normal.   Vitals reviewed.    Vitals:    12/04/18 1523 12/04/18 1609   BP:  "(!) 140/80 130/80   BP Location: Right arm    Patient Position: Sitting    BP Method: Large (Manual)    Pulse: 72    Resp: 16    Temp: 98.4 °F (36.9 °C)    TempSrc: Oral    SpO2: 98%    Weight: 72.4 kg (159 lb 11.6 oz)    Height: 5' 2" (1.575 m)        MOST RECENT LABS IN OUR ELECTRONIC MEDICAL RECORD:     Results for orders placed or performed in visit on 05/17/18   Lipid panel   Result Value Ref Range    Cholesterol 153 120 - 199 mg/dL    Triglycerides 93 30 - 150 mg/dL    HDL 64 40 - 75 mg/dL    LDL Cholesterol 70.4 63.0 - 159.0 mg/dL    HDL/Chol Ratio 41.8 20.0 - 50.0 %    Total Cholesterol/HDL Ratio 2.4 2.0 - 5.0    Non-HDL Cholesterol 89 mg/dL   TSH   Result Value Ref Range    TSH 2.385 0.400 - 4.000 uIU/mL   Comprehensive metabolic panel   Result Value Ref Range    Sodium 143 136 - 145 mmol/L    Potassium 3.4 (L) 3.5 - 5.1 mmol/L    Chloride 106 95 - 110 mmol/L    CO2 25 23 - 29 mmol/L    Glucose 93 70 - 110 mg/dL    BUN, Bld 16 8 - 23 mg/dL    Creatinine 0.8 0.5 - 1.4 mg/dL    Calcium 9.7 8.7 - 10.5 mg/dL    Total Protein 6.9 6.0 - 8.4 g/dL    Albumin 3.7 3.5 - 5.2 g/dL    Total Bilirubin 0.7 0.1 - 1.0 mg/dL    Alkaline Phosphatase 62 55 - 135 U/L    AST 18 10 - 40 U/L    ALT 17 10 - 44 U/L    Anion Gap 12 8 - 16 mmol/L    eGFR if African American >60.0 >60 mL/min/1.73 m^2    eGFR if non African American >60.0 >60 mL/min/1.73 m^2   CBC auto differential   Result Value Ref Range    WBC 6.92 3.90 - 12.70 K/uL    RBC 4.75 4.00 - 5.40 M/uL    Hemoglobin 14.6 12.0 - 16.0 g/dL    Hematocrit 43.8 37.0 - 48.5 %    MCV 92 82 - 98 fL    MCH 30.7 27.0 - 31.0 pg    MCHC 33.3 32.0 - 36.0 g/dL    RDW 12.7 11.5 - 14.5 %    Platelets 240 150 - 350 K/uL    MPV 11.2 9.2 - 12.9 fL    Immature Granulocytes 0.1 0.0 - 0.5 %    Gran # (ANC) 4.3 1.8 - 7.7 K/uL    Immature Grans (Abs) 0.01 0.00 - 0.04 K/uL    Lymph # 1.9 1.0 - 4.8 K/uL    Mono # 0.5 0.3 - 1.0 K/uL    Eos # 0.2 0.0 - 0.5 K/uL    Baso # 0.03 0.00 - 0.20 K/uL    nRBC 0 " 0 /100 WBC    Gran% 61.6 38.0 - 73.0 %    Lymph% 27.6 18.0 - 48.0 %    Mono% 7.8 4.0 - 15.0 %    Eosinophil% 2.5 0.0 - 8.0 %    Basophil% 0.4 0.0 - 1.9 %    Differential Method Automated

## 2018-12-08 NOTE — ADDENDUM NOTE
Addended by: ADAM MOORE on: 1/6/2017 10:27 AM     Modules accepted: Orders     C/w home Losartan  Primary team to call Pharmacy on Weekday to get complete meds.

## 2018-12-13 ENCOUNTER — TELEPHONE (OUTPATIENT)
Dept: FAMILY MEDICINE | Facility: CLINIC | Age: 68
End: 2018-12-13

## 2018-12-13 NOTE — TELEPHONE ENCOUNTER
Notified patient of results and verbalized understanding. States that she had received a letter as well.

## 2018-12-13 NOTE — TELEPHONE ENCOUNTER
----- Message from Jose Chew sent at 12/13/2018 12:04 PM CST -----  Type:  Patient Call Back    Who Called:pt  Who Left Message for Patient:  bear   Does the patient know what this is regarding?:  no  Best Call Back Number:  [phone]  Additional Information:  Please call pt and leave a detailed message if there is no answer.

## 2019-01-02 ENCOUNTER — INITIAL CONSULT (OUTPATIENT)
Dept: DERMATOLOGY | Facility: CLINIC | Age: 69
End: 2019-01-02
Payer: MEDICARE

## 2019-01-02 VITALS — HEIGHT: 62 IN | WEIGHT: 162.06 LBS | BODY MASS INDEX: 29.82 KG/M2 | RESPIRATION RATE: 17 BRPM

## 2019-01-02 DIAGNOSIS — D17.1 LIPOMA OF TORSO: ICD-10-CM

## 2019-01-02 DIAGNOSIS — L82.1 SEBORRHEIC KERATOSES: Primary | ICD-10-CM

## 2019-01-02 PROCEDURE — 99999 PR PBB SHADOW E&M-EST. PATIENT-LVL II: ICD-10-PCS | Mod: PBBFAC,,, | Performed by: DERMATOLOGY

## 2019-01-02 PROCEDURE — 99202 OFFICE O/P NEW SF 15 MIN: CPT | Mod: S$GLB,,, | Performed by: DERMATOLOGY

## 2019-01-02 PROCEDURE — 99999 PR PBB SHADOW E&M-EST. PATIENT-LVL II: CPT | Mod: PBBFAC,,, | Performed by: DERMATOLOGY

## 2019-01-02 PROCEDURE — 99202 PR OFFICE/OUTPT VISIT, NEW, LEVL II, 15-29 MIN: ICD-10-PCS | Mod: S$GLB,,, | Performed by: DERMATOLOGY

## 2019-01-02 PROCEDURE — 1101F PR PT FALLS ASSESS DOC 0-1 FALLS W/OUT INJ PAST YR: ICD-10-PCS | Mod: CPTII,S$GLB,, | Performed by: DERMATOLOGY

## 2019-01-02 PROCEDURE — 1101F PT FALLS ASSESS-DOCD LE1/YR: CPT | Mod: CPTII,S$GLB,, | Performed by: DERMATOLOGY

## 2019-01-02 NOTE — PROGRESS NOTES
Subjective:       Patient ID:  Katy Yarbrough is a 68 y.o. female who presents for   Chief Complaint   Patient presents with    Mole     LEFT forearm;    Spot     bridge of nose; back     HPI  69 yo F presents for evaluation of lesions on nose and left cheek, present for at least several months, stable in size, no bleeding.  The one on her nose was irritated when she hit it with the trunk of her car.  No prior treatments. The bump on her L upper back is asymptomatic, stable in size, no prior treatments    Past Medical History:   Diagnosis Date    Anemia     Chronic diarrhea     Hyperlipidemia     Hypertension     Over weight      Review of Systems   Skin: Negative for tendency to form keloidal scars.   Hematologic/Lymphatic: Does not bruise/bleed easily.        Objective:    Physical Exam   Constitutional: She appears well-developed and well-nourished.   Neurological: She is alert and oriented to person, place, and time.   Psychiatric: She has a normal mood and affect.   Skin:   Areas Examined (abnormalities noted in diagram):   Head / Face Inspection Performed  Neck Inspection Performed  Back Inspection Performed  LUE Inspection Performed                   Diagram Legend     Erythematous scaling macule/papule c/w actinic keratosis       Vascular papule c/w angioma      Pigmented verrucoid papule/plaque c/w seborrheic keratosis      Yellow umbilicated papule c/w sebaceous hyperplasia      Irregularly shaped tan macule c/w lentigo     1-2 mm smooth white papules consistent with Milia      Movable subcutaneous cyst with punctum c/w epidermal inclusion cyst      Subcutaneous movable cyst c/w pilar cyst      Firm pink to brown papule c/w dermatofibroma      Pedunculated fleshy papule(s) c/w skin tag(s)      Evenly pigmented macule c/w junctional nevus     Mildly variegated pigmented, slightly irregular-bordered macule c/w mildly atypical nevus      Flesh colored to evenly pigmented papule c/w intradermal nevus        Pink pearly papule/plaque c/w basal cell carcinoma      Erythematous hyperkeratotic cursted plaque c/w SCC      Surgical scar with no sign of skin cancer recurrence      Open and closed comedones      Inflammatory papules and pustules      Verrucoid papule consistent consistent with wart     Erythematous eczematous patches and plaques     Dystrophic onycholytic nail with subungual debris c/w onychomycosis     Umbilicated papule    Erythematous-base heme-crusted tan verrucoid plaque consistent with inflamed seborrheic keratosis     Erythematous Silvery Scaling Plaque c/w Psoriasis     See annotation      Assessment / Plan:        Seborrheic keratoses  These are benign inherited growths without a malignant potential. Reassurance given to patient. No treatment is necessary.   Discussed LN to lesion on face    Lipoma of torso  Reassurance that the lesion appears benign.  If it grows or becomes tender, those are good indications for surgical removal            Follow-up if symptoms worsen or fail to improve.

## 2019-01-02 NOTE — LETTER
January 2, 2019      Bill Garvey MD  [address] [facility] [address] [place] [address]           [facility]  [address] [facility] [address] [place] [address]  Phone: [phone]  Fax: [phone]          Patient: Katy Yarbrough   MR Number: [id number]   YOB: 1950   Date of Visit: 1/2/2019       Dear Dr. Bill Garvey:    Thank you for referring Katy Yarbrough to me for evaluation. Attached you will find relevant portions of my assessment and plan of care.    If you have questions, please do not hesitate to call me. I look forward to following Katy Yarbrough along with you.    Sincerely,    Chiara Gonzalez MD    Enclosure  CC:  No Recipients    If you would like to receive this communication electronically, please contact [email or web address] or [phone] to request more information on [employer] Link access.    For providers and/or their staff who would like to refer a patient to [facility], please contact us through our one-stop-shop provider referral line, [facility], at [phone].    If you feel you have received this communication in error or would no longer like to receive these types of communications, please e-mail [email or web address]

## 2019-01-19 DIAGNOSIS — I10 ESSENTIAL HYPERTENSION: ICD-10-CM

## 2019-01-19 RX ORDER — HYDROCHLOROTHIAZIDE 12.5 MG/1
CAPSULE ORAL
Qty: 90 CAPSULE | Refills: 1 | Status: SHIPPED | OUTPATIENT
Start: 2019-01-19 | End: 2019-07-03 | Stop reason: SDUPTHER

## 2019-01-19 RX ORDER — TRAZODONE HYDROCHLORIDE 50 MG/1
TABLET ORAL
Qty: 90 TABLET | Refills: 1 | Status: SHIPPED | OUTPATIENT
Start: 2019-01-19 | End: 2019-07-03 | Stop reason: SDUPTHER

## 2019-01-31 ENCOUNTER — LAB VISIT (OUTPATIENT)
Dept: LAB | Facility: HOSPITAL | Age: 69
End: 2019-01-31
Attending: FAMILY MEDICINE
Payer: MEDICARE

## 2019-01-31 DIAGNOSIS — E78.5 HYPERLIPIDEMIA, UNSPECIFIED HYPERLIPIDEMIA TYPE: ICD-10-CM

## 2019-01-31 DIAGNOSIS — I10 ESSENTIAL HYPERTENSION: ICD-10-CM

## 2019-01-31 LAB
ALBUMIN SERPL BCP-MCNC: 4 G/DL
ALP SERPL-CCNC: 53 U/L
ALT SERPL W/O P-5'-P-CCNC: 19 U/L
ANION GAP SERPL CALC-SCNC: 11 MMOL/L
AST SERPL-CCNC: 21 U/L
BILIRUB SERPL-MCNC: 0.6 MG/DL
BUN SERPL-MCNC: 16 MG/DL
CALCIUM SERPL-MCNC: 9.8 MG/DL
CHLORIDE SERPL-SCNC: 105 MMOL/L
CHOLEST SERPL-MCNC: 166 MG/DL
CHOLEST/HDLC SERPL: 2.1 {RATIO}
CO2 SERPL-SCNC: 26 MMOL/L
CREAT SERPL-MCNC: 0.9 MG/DL
EST. GFR  (AFRICAN AMERICAN): >60 ML/MIN/1.73 M^2
EST. GFR  (NON AFRICAN AMERICAN): >60 ML/MIN/1.73 M^2
GLUCOSE SERPL-MCNC: 94 MG/DL
HDLC SERPL-MCNC: 79 MG/DL
HDLC SERPL: 47.6 %
LDLC SERPL CALC-MCNC: 66.4 MG/DL
NONHDLC SERPL-MCNC: 87 MG/DL
POTASSIUM SERPL-SCNC: 3.8 MMOL/L
PROT SERPL-MCNC: 7.4 G/DL
SODIUM SERPL-SCNC: 142 MMOL/L
TRIGL SERPL-MCNC: 103 MG/DL
TSH SERPL DL<=0.005 MIU/L-ACNC: 2.71 UIU/ML

## 2019-01-31 PROCEDURE — 80061 LIPID PANEL: CPT | Mod: HCNC

## 2019-01-31 PROCEDURE — 80053 COMPREHEN METABOLIC PANEL: CPT | Mod: HCNC

## 2019-01-31 PROCEDURE — 36415 COLL VENOUS BLD VENIPUNCTURE: CPT | Mod: HCNC,PN

## 2019-01-31 PROCEDURE — 84443 ASSAY THYROID STIM HORMONE: CPT | Mod: HCNC

## 2019-02-07 ENCOUNTER — OFFICE VISIT (OUTPATIENT)
Dept: FAMILY MEDICINE | Facility: CLINIC | Age: 69
End: 2019-02-07
Payer: MEDICARE

## 2019-02-07 VITALS
HEIGHT: 62 IN | SYSTOLIC BLOOD PRESSURE: 102 MMHG | BODY MASS INDEX: 28.14 KG/M2 | TEMPERATURE: 98 F | WEIGHT: 152.88 LBS | RESPIRATION RATE: 16 BRPM | DIASTOLIC BLOOD PRESSURE: 64 MMHG | HEART RATE: 70 BPM

## 2019-02-07 DIAGNOSIS — I10 ESSENTIAL HYPERTENSION: Primary | ICD-10-CM

## 2019-02-07 DIAGNOSIS — E78.5 HYPERLIPIDEMIA, UNSPECIFIED HYPERLIPIDEMIA TYPE: ICD-10-CM

## 2019-02-07 PROCEDURE — 3074F SYST BP LT 130 MM HG: CPT | Mod: HCNC,CPTII,S$GLB, | Performed by: FAMILY MEDICINE

## 2019-02-07 PROCEDURE — 1101F PT FALLS ASSESS-DOCD LE1/YR: CPT | Mod: HCNC,CPTII,S$GLB, | Performed by: FAMILY MEDICINE

## 2019-02-07 PROCEDURE — 99214 OFFICE O/P EST MOD 30 MIN: CPT | Mod: HCNC,S$GLB,, | Performed by: FAMILY MEDICINE

## 2019-02-07 PROCEDURE — 3078F PR MOST RECENT DIASTOLIC BLOOD PRESSURE < 80 MM HG: ICD-10-PCS | Mod: HCNC,CPTII,S$GLB, | Performed by: FAMILY MEDICINE

## 2019-02-07 PROCEDURE — 1101F PR PT FALLS ASSESS DOC 0-1 FALLS W/OUT INJ PAST YR: ICD-10-PCS | Mod: HCNC,CPTII,S$GLB, | Performed by: FAMILY MEDICINE

## 2019-02-07 PROCEDURE — 99214 PR OFFICE/OUTPT VISIT, EST, LEVL IV, 30-39 MIN: ICD-10-PCS | Mod: HCNC,S$GLB,, | Performed by: FAMILY MEDICINE

## 2019-02-07 PROCEDURE — 3078F DIAST BP <80 MM HG: CPT | Mod: HCNC,CPTII,S$GLB, | Performed by: FAMILY MEDICINE

## 2019-02-07 PROCEDURE — 99999 PR PBB SHADOW E&M-EST. PATIENT-LVL III: CPT | Mod: PBBFAC,HCNC,, | Performed by: FAMILY MEDICINE

## 2019-02-07 PROCEDURE — 3074F PR MOST RECENT SYSTOLIC BLOOD PRESSURE < 130 MM HG: ICD-10-PCS | Mod: HCNC,CPTII,S$GLB, | Performed by: FAMILY MEDICINE

## 2019-02-07 PROCEDURE — 99999 PR PBB SHADOW E&M-EST. PATIENT-LVL III: ICD-10-PCS | Mod: PBBFAC,HCNC,, | Performed by: FAMILY MEDICINE

## 2019-02-07 NOTE — PROGRESS NOTES
THIS DOCUMENT WAS MADE IN PART WITH VOICE RECOGNITION SOFTWARE.  OCCASIONALLY THIS SOFTWARE WILL MISINTERPRET WORDS OR PHRASES.      Katy Yarbrough  1950    Katy was seen today for hypertension.    Diagnoses and all orders for this visit:    Essential hypertension  Chronic condition, it appears well controlled but low today.  She does report occasional mild orthostatic hypotension symptoms.  But not regularly.  I asked her to monitor this, if consistently low I may consider dose reduction medicine but overall she appears to be doing well.    Hyperlipidemia, unspecified hyperlipidemia type  -     Comprehensive metabolic panel; Future  -     Lipid panel; Future      She had Prevnar in January.  She will return to the pharmacy and 6 months for Pneumovax.  We also discussed Shingrix which she plans to receive when available    Subjective     Chief Complaint   Patient presents with    Hypertension     follow up       Hypertension   This is a chronic problem. The current episode started more than 1 year ago. Progression since onset: improved. The problem is controlled. Pertinent negatives include no chest pain or headaches. There are no associated agents to hypertension. Risk factors for coronary artery disease include dyslipidemia. Past treatments include angiotensin blockers, diuretics and calcium channel blockers. The current treatment provides significant improvement. There are no compliance problems.  There is no history of angina.         HPI elements addressed above in the assessment and plan including problems, diagnosis, stability/instability,  improving/worsening, and chronicity will not be duplicated in this section. Any important additional HPI topics will be discussed here if needed.    Active Ambulatory Problems     Diagnosis Date Noted    Hypertension     Hyperlipidemia     URI (upper respiratory infection) 04/25/2015     Resolved Ambulatory Problems     Diagnosis Date Noted    C. difficile  "diarrhea 01/09/2013     Past Medical History:   Diagnosis Date    Anemia     Chronic diarrhea     Hyperlipidemia     Hypertension     Over weight          Review of Systems   Eyes: Negative for visual disturbance.   Cardiovascular: Negative for chest pain.   Gastrointestinal: Negative.    Endocrine: Negative for polydipsia and polyuria.   Genitourinary: Negative.    Musculoskeletal: Negative.    Neurological: Negative for headaches.       Objective     Physical Exam   Constitutional: She is oriented to person, place, and time. She appears well-developed and well-nourished.   HENT:   Head: Normocephalic and atraumatic.   Eyes: No scleral icterus.   Cardiovascular: Normal rate, regular rhythm and normal heart sounds.   No murmur heard.  Pulmonary/Chest: Effort normal and breath sounds normal. No respiratory distress.   Neurological: She is alert and oriented to person, place, and time.   Skin: Skin is dry. No rash noted. She is not diaphoretic.   Psychiatric: She has a normal mood and affect. Her behavior is normal.   Vitals reviewed.    Vitals:    02/07/19 0929   BP: 102/64   BP Location: Left arm   Patient Position: Sitting   BP Method: Large (Manual)   Pulse: 70   Resp: 16   Temp: 97.7 °F (36.5 °C)   TempSrc: Oral   Weight: 69.3 kg (152 lb 14.2 oz)   Height: 5' 2" (1.575 m)       MOST RECENT LABS IN OUR ELECTRONIC MEDICAL RECORD:     Results for orders placed or performed in visit on 01/31/19   Comprehensive metabolic panel   Result Value Ref Range    Sodium 142 136 - 145 mmol/L    Potassium 3.8 3.5 - 5.1 mmol/L    Chloride 105 95 - 110 mmol/L    CO2 26 23 - 29 mmol/L    Glucose 94 70 - 110 mg/dL    BUN, Bld 16 8 - 23 mg/dL    Creatinine 0.9 0.5 - 1.4 mg/dL    Calcium 9.8 8.7 - 10.5 mg/dL    Total Protein 7.4 6.0 - 8.4 g/dL    Albumin 4.0 3.5 - 5.2 g/dL    Total Bilirubin 0.6 0.1 - 1.0 mg/dL    Alkaline Phosphatase 53 (L) 55 - 135 U/L    AST 21 10 - 40 U/L    ALT 19 10 - 44 U/L    Anion Gap 11 8 - 16 mmol/L    " eGFR if African American >60.0 >60 mL/min/1.73 m^2    eGFR if non African American >60.0 >60 mL/min/1.73 m^2   TSH   Result Value Ref Range    TSH 2.708 0.400 - 4.000 uIU/mL   Lipid panel   Result Value Ref Range    Cholesterol 166 120 - 199 mg/dL    Triglycerides 103 30 - 150 mg/dL    HDL 79 (H) 40 - 75 mg/dL    LDL Cholesterol 66.4 63.0 - 159.0 mg/dL    HDL/Chol Ratio 47.6 20.0 - 50.0 %    Total Cholesterol/HDL Ratio 2.1 2.0 - 5.0    Non-HDL Cholesterol 87 mg/dL

## 2019-05-22 ENCOUNTER — PES CALL (OUTPATIENT)
Dept: ADMINISTRATIVE | Facility: CLINIC | Age: 69
End: 2019-05-22

## 2019-07-03 DIAGNOSIS — E78.5 HYPERLIPIDEMIA, UNSPECIFIED HYPERLIPIDEMIA TYPE: ICD-10-CM

## 2019-07-03 DIAGNOSIS — I10 ESSENTIAL HYPERTENSION: ICD-10-CM

## 2019-07-03 RX ORDER — ATORVASTATIN CALCIUM 40 MG/1
TABLET, FILM COATED ORAL
Qty: 90 TABLET | Refills: 1 | Status: SHIPPED | OUTPATIENT
Start: 2019-07-03 | End: 2020-01-12

## 2019-07-03 RX ORDER — HYDROCHLOROTHIAZIDE 12.5 MG/1
CAPSULE ORAL
Qty: 90 CAPSULE | Refills: 1 | Status: SHIPPED | OUTPATIENT
Start: 2019-07-03 | End: 2020-01-12

## 2019-07-03 RX ORDER — LOSARTAN POTASSIUM 100 MG/1
TABLET ORAL
Qty: 90 TABLET | Refills: 1 | Status: SHIPPED | OUTPATIENT
Start: 2019-07-03 | End: 2020-01-12

## 2019-07-03 RX ORDER — AMLODIPINE BESYLATE 5 MG/1
TABLET ORAL
Qty: 90 TABLET | Refills: 1 | Status: SHIPPED | OUTPATIENT
Start: 2019-07-03 | End: 2020-01-12

## 2019-07-03 RX ORDER — TRAZODONE HYDROCHLORIDE 50 MG/1
TABLET ORAL
Qty: 90 TABLET | Refills: 1 | Status: SHIPPED | OUTPATIENT
Start: 2019-07-03 | End: 2020-01-12

## 2019-08-07 ENCOUNTER — LAB VISIT (OUTPATIENT)
Dept: LAB | Facility: HOSPITAL | Age: 69
End: 2019-08-07
Attending: FAMILY MEDICINE
Payer: MEDICARE

## 2019-08-07 DIAGNOSIS — E78.5 HYPERLIPIDEMIA, UNSPECIFIED HYPERLIPIDEMIA TYPE: ICD-10-CM

## 2019-08-07 LAB
ALBUMIN SERPL BCP-MCNC: 3.9 G/DL (ref 3.5–5.2)
ALP SERPL-CCNC: 48 U/L (ref 55–135)
ALT SERPL W/O P-5'-P-CCNC: 21 U/L (ref 10–44)
ANION GAP SERPL CALC-SCNC: 8 MMOL/L (ref 8–16)
AST SERPL-CCNC: 23 U/L (ref 10–40)
BILIRUB SERPL-MCNC: 0.5 MG/DL (ref 0.1–1)
BUN SERPL-MCNC: 19 MG/DL (ref 8–23)
CALCIUM SERPL-MCNC: 10.2 MG/DL (ref 8.7–10.5)
CHLORIDE SERPL-SCNC: 104 MMOL/L (ref 95–110)
CHOLEST SERPL-MCNC: 199 MG/DL (ref 120–199)
CHOLEST/HDLC SERPL: 2.3 {RATIO} (ref 2–5)
CO2 SERPL-SCNC: 31 MMOL/L (ref 23–29)
CREAT SERPL-MCNC: 0.8 MG/DL (ref 0.5–1.4)
EST. GFR  (AFRICAN AMERICAN): >60 ML/MIN/1.73 M^2
EST. GFR  (NON AFRICAN AMERICAN): >60 ML/MIN/1.73 M^2
GLUCOSE SERPL-MCNC: 88 MG/DL (ref 70–110)
HDLC SERPL-MCNC: 85 MG/DL (ref 40–75)
HDLC SERPL: 42.7 % (ref 20–50)
LDLC SERPL CALC-MCNC: 92 MG/DL (ref 63–159)
NONHDLC SERPL-MCNC: 114 MG/DL
POTASSIUM SERPL-SCNC: 4.3 MMOL/L (ref 3.5–5.1)
PROT SERPL-MCNC: 7.2 G/DL (ref 6–8.4)
SODIUM SERPL-SCNC: 143 MMOL/L (ref 136–145)
TRIGL SERPL-MCNC: 110 MG/DL (ref 30–150)

## 2019-08-07 PROCEDURE — 80053 COMPREHEN METABOLIC PANEL: CPT | Mod: HCNC

## 2019-08-07 PROCEDURE — 36415 COLL VENOUS BLD VENIPUNCTURE: CPT | Mod: HCNC,PN

## 2019-08-07 PROCEDURE — 80061 LIPID PANEL: CPT | Mod: HCNC

## 2019-08-14 ENCOUNTER — OFFICE VISIT (OUTPATIENT)
Dept: FAMILY MEDICINE | Facility: CLINIC | Age: 69
End: 2019-08-14
Payer: MEDICARE

## 2019-08-14 VITALS
HEIGHT: 62 IN | TEMPERATURE: 99 F | BODY MASS INDEX: 27.49 KG/M2 | RESPIRATION RATE: 18 BRPM | DIASTOLIC BLOOD PRESSURE: 80 MMHG | HEART RATE: 76 BPM | WEIGHT: 149.38 LBS | SYSTOLIC BLOOD PRESSURE: 130 MMHG

## 2019-08-14 DIAGNOSIS — B07.9 VIRAL WARTS, UNSPECIFIED TYPE: ICD-10-CM

## 2019-08-14 DIAGNOSIS — E78.5 HYPERLIPIDEMIA, UNSPECIFIED HYPERLIPIDEMIA TYPE: ICD-10-CM

## 2019-08-14 DIAGNOSIS — I10 ESSENTIAL HYPERTENSION: Primary | ICD-10-CM

## 2019-08-14 DIAGNOSIS — L82.1 SEBORRHEIC KERATOSES: ICD-10-CM

## 2019-08-14 PROCEDURE — 1101F PR PT FALLS ASSESS DOC 0-1 FALLS W/OUT INJ PAST YR: ICD-10-PCS | Mod: HCNC,CPTII,S$GLB, | Performed by: FAMILY MEDICINE

## 2019-08-14 PROCEDURE — 99999 PR PBB SHADOW E&M-EST. PATIENT-LVL III: ICD-10-PCS | Mod: PBBFAC,HCNC,, | Performed by: FAMILY MEDICINE

## 2019-08-14 PROCEDURE — 3079F DIAST BP 80-89 MM HG: CPT | Mod: HCNC,CPTII,S$GLB, | Performed by: FAMILY MEDICINE

## 2019-08-14 PROCEDURE — 17110 PR DESTRUCTION BENIGN LESIONS UP TO 14: ICD-10-PCS | Mod: HCNC,S$GLB,, | Performed by: FAMILY MEDICINE

## 2019-08-14 PROCEDURE — 99214 PR OFFICE/OUTPT VISIT, EST, LEVL IV, 30-39 MIN: ICD-10-PCS | Mod: 25,HCNC,S$GLB, | Performed by: FAMILY MEDICINE

## 2019-08-14 PROCEDURE — 3079F PR MOST RECENT DIASTOLIC BLOOD PRESSURE 80-89 MM HG: ICD-10-PCS | Mod: HCNC,CPTII,S$GLB, | Performed by: FAMILY MEDICINE

## 2019-08-14 PROCEDURE — 3075F SYST BP GE 130 - 139MM HG: CPT | Mod: HCNC,CPTII,S$GLB, | Performed by: FAMILY MEDICINE

## 2019-08-14 PROCEDURE — 1101F PT FALLS ASSESS-DOCD LE1/YR: CPT | Mod: HCNC,CPTII,S$GLB, | Performed by: FAMILY MEDICINE

## 2019-08-14 PROCEDURE — 99999 PR PBB SHADOW E&M-EST. PATIENT-LVL III: CPT | Mod: PBBFAC,HCNC,, | Performed by: FAMILY MEDICINE

## 2019-08-14 PROCEDURE — 99214 OFFICE O/P EST MOD 30 MIN: CPT | Mod: 25,HCNC,S$GLB, | Performed by: FAMILY MEDICINE

## 2019-08-14 PROCEDURE — 17110 DESTRUCTION B9 LES UP TO 14: CPT | Mod: HCNC,S$GLB,, | Performed by: FAMILY MEDICINE

## 2019-08-14 PROCEDURE — 3075F PR MOST RECENT SYSTOLIC BLOOD PRESS GE 130-139MM HG: ICD-10-PCS | Mod: HCNC,CPTII,S$GLB, | Performed by: FAMILY MEDICINE

## 2019-08-14 NOTE — PROGRESS NOTES
THIS DOCUMENT WAS MADE IN PART WITH VOICE RECOGNITION SOFTWARE.  OCCASIONALLY THIS SOFTWARE WILL MISINTERPRET WORDS OR PHRASES.      Katy Yarbrough  1950    Katy was seen today for hypertension.    Diagnoses and all orders for this visit:    Essential hypertension  -     Comprehensive metabolic panel; Future  -     TSH; Future  -     Lipid panel; Future  Overall average is satisfactory.  She is no longer having any lightheadedness that she reported last time.  She should continue to monitor this however.    Hyperlipidemia, unspecified hyperlipidemia type  -     Comprehensive metabolic panel; Future  -     TSH; Future  -     Lipid panel; Future  Chronic condition with mild worsening.  She has not been watching her diet over the last month but have been doing very well for awhile before that.  So no medication changes resume appropriate diet    Addendum:  Specific procedure notes added as requested  Seborrheic keratosis  Lesion on the left arm is consistent with a benign seborrheic keratosis, but it is bothersome and she requested treatment.  Options discussed.  We proceeded with cryotherapy with her verbal consent.  Postprocedure structures given.  Procedure note:  The left arm was secured, gently.  Liquid nitrogen was spray topically via an FDA certified canister, no leaks, patient tolerated the procedure well.  There was no blood loss.    Viral warts, unspecified type  Wart on her right middle finger.  It does bother some and often traumatized.  We discussed treatment options.  This specific lesion will require multiple treatments.  She does prefer to proceed with cryotherapy.  This was performed today, she will return in about 3-4 weeks for scraping and repeat cryotherapy.  Procedure note.  The finger was secured safely and gently.  Liquid nitrogen was sprayed with the same canister as mentioned above, directly onto the lesion intermittently to obtain the adequate freeze time and to minimize damage to the  surrounding tissue.  There was no bleeding, patient tolerated the procedure well.    rx for pneumovax, had prevnar January  Reminded on flu shot    2 skin lesion    Subjective     Chief Complaint   Patient presents with    Hypertension     follow up       Hypertension   This is a chronic problem. The current episode started more than 1 year ago. The problem has been waxing and waning since onset. The problem is controlled. Pertinent negatives include no chest pain or shortness of breath. There are no associated agents to hypertension. Risk factors for coronary artery disease include dyslipidemia. Past treatments include angiotensin blockers and diuretics. The current treatment provides significant improvement. There are no compliance problems.  There is no history of chronic renal disease.   Hyperlipidemia   This is a chronic problem. The problem is resistant (mild worsening). She has no history of chronic renal disease. Pertinent negatives include no chest pain, myalgias or shortness of breath. Current antihyperlipidemic treatment includes statins. The current treatment provides mild improvement of lipids. There are no compliance problems.          HPI elements addressed above in the assessment and plan including problems, diagnosis, stability/instability,  improving/worsening, and chronicity will not be duplicated in this section. Any important additional HPI topics will be discussed here if needed.    Active Ambulatory Problems     Diagnosis Date Noted    Hypertension     Hyperlipidemia      Resolved Ambulatory Problems     Diagnosis Date Noted    C. difficile diarrhea 01/09/2013    URI (upper respiratory infection) 04/25/2015     Past Medical History:   Diagnosis Date    Anemia     Chronic diarrhea     Hyperlipidemia     Hypertension     Over weight      Current Outpatient Medications on File Prior to Visit   Medication Sig Dispense Refill    amLODIPine (NORVASC) 5 MG tablet TAKE 1 TABLET EVERY DAY 90  "tablet 1    ascorbic acid (VITAMIN C) 1000 MG tablet Take 1,000 mg by mouth once daily.      atorvastatin (LIPITOR) 40 MG tablet TAKE 1 TABLET EVERY EVENING 90 tablet 1    hydroCHLOROthiazide (MICROZIDE) 12.5 mg capsule TAKE 1 CAPSULE EVERY DAY 90 capsule 1    losartan (COZAAR) 100 MG tablet TAKE 1 TABLET EVERY DAY 90 tablet 1    magnesium 200 mg Tab       naproxen sodium 220 mg Cap       traZODone (DESYREL) 50 MG tablet TAKE 1 TABLET EVERY EVENING 90 tablet 1    TURMERIC ORAL Take 1 Dose by mouth once daily.       No current facility-administered medications on file prior to visit.          Review of Systems   Respiratory: Negative for shortness of breath and wheezing.    Cardiovascular: Negative for chest pain.   Gastrointestinal: Negative.    Endocrine: Negative.    Genitourinary: Negative.    Musculoskeletal: Negative for myalgias.   Neurological: Negative for light-headedness (sx resolved from last visit).       Objective     Physical Exam   Constitutional: She is oriented to person, place, and time. She appears well-developed and well-nourished.   HENT:   Head: Normocephalic and atraumatic.   Eyes: No scleral icterus.   Cardiovascular: Normal rate, regular rhythm and normal heart sounds.   No murmur heard.  Pulmonary/Chest: Effort normal and breath sounds normal. No respiratory distress.   Neurological: She is alert and oriented to person, place, and time.   Skin: Skin is dry. No rash noted. She is not diaphoretic.        Psychiatric: She has a normal mood and affect. Her behavior is normal.   Vitals reviewed.    Vitals:    08/14/19 0907 08/14/19 0932   BP: (!) 144/80 130/80   BP Location: Right arm    Patient Position: Sitting    BP Method: Medium (Manual)    Pulse: 76    Resp: 18    Temp: 98.5 °F (36.9 °C)    TempSrc: Oral    Weight: 67.8 kg (149 lb 5.8 oz)    Height: 5' 2" (1.575 m)        MOST RECENT LABS IN OUR ELECTRONIC MEDICAL RECORD:     Results for orders placed or performed in visit on " 08/07/19   Comprehensive metabolic panel   Result Value Ref Range    Sodium 143 136 - 145 mmol/L    Potassium 4.3 3.5 - 5.1 mmol/L    Chloride 104 95 - 110 mmol/L    CO2 31 (H) 23 - 29 mmol/L    Glucose 88 70 - 110 mg/dL    BUN, Bld 19 8 - 23 mg/dL    Creatinine 0.8 0.5 - 1.4 mg/dL    Calcium 10.2 8.7 - 10.5 mg/dL    Total Protein 7.2 6.0 - 8.4 g/dL    Albumin 3.9 3.5 - 5.2 g/dL    Total Bilirubin 0.5 0.1 - 1.0 mg/dL    Alkaline Phosphatase 48 (L) 55 - 135 U/L    AST 23 10 - 40 U/L    ALT 21 10 - 44 U/L    Anion Gap 8 8 - 16 mmol/L    eGFR if African American >60.0 >60 mL/min/1.73 m^2    eGFR if non African American >60.0 >60 mL/min/1.73 m^2   Lipid panel   Result Value Ref Range    Cholesterol 199 120 - 199 mg/dL    Triglycerides 110 30 - 150 mg/dL    HDL 85 (H) 40 - 75 mg/dL    LDL Cholesterol 92.0 63.0 - 159.0 mg/dL    Hdl/Cholesterol Ratio 42.7 20.0 - 50.0 %    Total Cholesterol/HDL Ratio 2.3 2.0 - 5.0    Non-HDL Cholesterol 114 mg/dL

## 2019-08-16 ENCOUNTER — PATIENT MESSAGE (OUTPATIENT)
Dept: FAMILY MEDICINE | Facility: CLINIC | Age: 69
End: 2019-08-16

## 2019-08-28 ENCOUNTER — PATIENT OUTREACH (OUTPATIENT)
Dept: ADMINISTRATIVE | Facility: HOSPITAL | Age: 69
End: 2019-08-28

## 2019-09-11 ENCOUNTER — OFFICE VISIT (OUTPATIENT)
Dept: FAMILY MEDICINE | Facility: CLINIC | Age: 69
End: 2019-09-11
Payer: MEDICARE

## 2019-09-11 VITALS
HEIGHT: 62 IN | DIASTOLIC BLOOD PRESSURE: 82 MMHG | HEART RATE: 72 BPM | WEIGHT: 146.81 LBS | RESPIRATION RATE: 16 BRPM | TEMPERATURE: 99 F | SYSTOLIC BLOOD PRESSURE: 120 MMHG | BODY MASS INDEX: 27.02 KG/M2

## 2019-09-11 DIAGNOSIS — B07.9 VIRAL WARTS, UNSPECIFIED TYPE: Primary | ICD-10-CM

## 2019-09-11 DIAGNOSIS — L82.0 INFLAMED SEBORRHEIC KERATOSIS: ICD-10-CM

## 2019-09-11 PROCEDURE — 17110 DESTRUCTION B9 LES UP TO 14: CPT | Mod: HCNC,S$GLB,, | Performed by: FAMILY MEDICINE

## 2019-09-11 PROCEDURE — 99999 PR PBB SHADOW E&M-EST. PATIENT-LVL III: ICD-10-PCS | Mod: PBBFAC,HCNC,, | Performed by: FAMILY MEDICINE

## 2019-09-11 PROCEDURE — 99499 NO LOS: ICD-10-PCS | Mod: HCNC,S$GLB,, | Performed by: FAMILY MEDICINE

## 2019-09-11 PROCEDURE — 99499 UNLISTED E&M SERVICE: CPT | Mod: HCNC,S$GLB,, | Performed by: FAMILY MEDICINE

## 2019-09-11 PROCEDURE — 99999 PR PBB SHADOW E&M-EST. PATIENT-LVL III: CPT | Mod: PBBFAC,HCNC,, | Performed by: FAMILY MEDICINE

## 2019-09-11 PROCEDURE — 17110 PR DESTRUCTION BENIGN LESIONS UP TO 14: ICD-10-PCS | Mod: HCNC,S$GLB,, | Performed by: FAMILY MEDICINE

## 2019-09-11 NOTE — PROGRESS NOTES
THIS DOCUMENT WAS MADE IN PART WITH VOICE RECOGNITION SOFTWARE.  OCCASIONALLY THIS SOFTWARE WILL MISINTERPRET WORDS OR PHRASES.      Katy Yarbrough  1950    Katy was seen today for follow-up.    Diagnoses and all orders for this visit:    Viral warts, unspecified type    Inflamed seborrheic keratosis     to summarize, three procedures today. On her left forearm is residual remnants of an inflamed seb. keratosis. This was treated with cryotherapy a few weeks ago but has improved but is not resolved. It is bothersome and she has elected to proceed with repeat cryotherapy. This was done again.     There's a new lesion that she mentions today, she showed me a lesion on the anterior right thigh, it also itches and is bothersome been there for a while and is gradually enlarging. On exam there is a brown rough raised scaly lesion with a stock on appearance consistent with an inflamed seborrheic keratosis. Is bothersome and she elected to proceed with cryotherapy today. This was done without any difficulty.     the final and third procedure is repeat treatment of a verruca on the right hand. On exam there has been improvement after initial cryotherapy. Today the area was cleaned with alcohol scraped with a 15 blade scalpel. Most of the lesion has been removed but there are still some remnant . This was re-treated with cryotherapy, After the scraping. Hopefully this will take care of it but there is a possibility another treatment is necessary so she will let me know in a month or so is not resolved    Subjective     Chief Complaint   Patient presents with    Follow-up       HPI    Lesion left forearm mostly improved but there are still some rough spots.  Treated again.    The wart on the anterior side of her right middle finger over the the IP joint has improved.  This was treated again with scraping and cryotherapy.  If any residual remains she may want to return in 1 month but hopefully this will take care of  "it    She also has an inflamed seborrheic keratosis on the top of her right thigh that she requested cryotherapy    HPI elements addressed above in the assessment and plan including problems, diagnosis, stability/instability,  improving/worsening, and chronicity will not be duplicated in this section. Any important additional HPI topics will be discussed here if needed.    Active Ambulatory Problems     Diagnosis Date Noted    Hypertension     Hyperlipidemia      Resolved Ambulatory Problems     Diagnosis Date Noted    C. difficile diarrhea 01/09/2013    URI (upper respiratory infection) 04/25/2015     Past Medical History:   Diagnosis Date    Anemia     Chronic diarrhea     Hyperlipidemia     Hypertension     Over weight          Review of Systems   no changes or acute concerns    Objective     Physical Exam  Vitals:    09/11/19 1016   BP: 120/82   BP Location: Left arm   Patient Position: Sitting   BP Method: Medium (Manual)   Pulse: 72   Resp: 16   Temp: 99 °F (37.2 °C)   TempSrc: Oral   Weight: 66.6 kg (146 lb 13.2 oz)   Height: 5' 2" (1.575 m)       MOST RECENT LABS IN OUR ELECTRONIC MEDICAL RECORD:     Results for orders placed or performed in visit on 08/07/19   Comprehensive metabolic panel   Result Value Ref Range    Sodium 143 136 - 145 mmol/L    Potassium 4.3 3.5 - 5.1 mmol/L    Chloride 104 95 - 110 mmol/L    CO2 31 (H) 23 - 29 mmol/L    Glucose 88 70 - 110 mg/dL    BUN, Bld 19 8 - 23 mg/dL    Creatinine 0.8 0.5 - 1.4 mg/dL    Calcium 10.2 8.7 - 10.5 mg/dL    Total Protein 7.2 6.0 - 8.4 g/dL    Albumin 3.9 3.5 - 5.2 g/dL    Total Bilirubin 0.5 0.1 - 1.0 mg/dL    Alkaline Phosphatase 48 (L) 55 - 135 U/L    AST 23 10 - 40 U/L    ALT 21 10 - 44 U/L    Anion Gap 8 8 - 16 mmol/L    eGFR if African American >60.0 >60 mL/min/1.73 m^2    eGFR if non African American >60.0 >60 mL/min/1.73 m^2   Lipid panel   Result Value Ref Range    Cholesterol 199 120 - 199 mg/dL    Triglycerides 110 30 - 150 mg/dL    " HDL 85 (H) 40 - 75 mg/dL    LDL Cholesterol 92.0 63.0 - 159.0 mg/dL    Hdl/Cholesterol Ratio 42.7 20.0 - 50.0 %    Total Cholesterol/HDL Ratio 2.3 2.0 - 5.0    Non-HDL Cholesterol 114 mg/dL

## 2019-10-23 ENCOUNTER — IMMUNIZATION (OUTPATIENT)
Dept: PHARMACY | Facility: CLINIC | Age: 69
End: 2019-10-23
Payer: MEDICARE

## 2019-11-08 ENCOUNTER — DOCUMENTATION ONLY (OUTPATIENT)
Dept: FAMILY MEDICINE | Facility: CLINIC | Age: 69
End: 2019-11-08

## 2019-11-08 NOTE — PROGRESS NOTES
Called patient in regards to scheduling her annual mammogram. No answer. Left voicemail to return phone call to clinic.

## 2019-11-26 ENCOUNTER — PATIENT OUTREACH (OUTPATIENT)
Dept: ADMINISTRATIVE | Facility: HOSPITAL | Age: 69
End: 2019-11-26

## 2020-01-12 DIAGNOSIS — E78.5 HYPERLIPIDEMIA, UNSPECIFIED HYPERLIPIDEMIA TYPE: ICD-10-CM

## 2020-01-12 DIAGNOSIS — I10 ESSENTIAL HYPERTENSION: ICD-10-CM

## 2020-01-12 RX ORDER — LOSARTAN POTASSIUM 100 MG/1
TABLET ORAL
Qty: 90 TABLET | Refills: 1 | Status: SHIPPED | OUTPATIENT
Start: 2020-01-12 | End: 2020-07-07

## 2020-01-12 RX ORDER — HYDROCHLOROTHIAZIDE 12.5 MG/1
CAPSULE ORAL
Qty: 90 CAPSULE | Refills: 1 | Status: SHIPPED | OUTPATIENT
Start: 2020-01-12 | End: 2020-07-07

## 2020-01-12 RX ORDER — TRAZODONE HYDROCHLORIDE 50 MG/1
TABLET ORAL
Qty: 90 TABLET | Refills: 1 | Status: SHIPPED | OUTPATIENT
Start: 2020-01-12 | End: 2020-07-07

## 2020-01-12 RX ORDER — AMLODIPINE BESYLATE 5 MG/1
TABLET ORAL
Qty: 90 TABLET | Refills: 1 | Status: SHIPPED | OUTPATIENT
Start: 2020-01-12 | End: 2020-07-07

## 2020-01-12 RX ORDER — ATORVASTATIN CALCIUM 40 MG/1
TABLET, FILM COATED ORAL
Qty: 90 TABLET | Refills: 1 | Status: SHIPPED | OUTPATIENT
Start: 2020-01-12 | End: 2020-07-07

## 2020-01-16 ENCOUNTER — PATIENT OUTREACH (OUTPATIENT)
Dept: ADMINISTRATIVE | Facility: HOSPITAL | Age: 70
End: 2020-01-16

## 2020-01-23 ENCOUNTER — TELEPHONE (OUTPATIENT)
Dept: ADMINISTRATIVE | Facility: HOSPITAL | Age: 70
End: 2020-01-23

## 2020-01-23 DIAGNOSIS — Z12.31 ENCOUNTER FOR SCREENING MAMMOGRAM FOR BREAST CANCER: Primary | ICD-10-CM

## 2020-01-30 ENCOUNTER — OFFICE VISIT (OUTPATIENT)
Dept: FAMILY MEDICINE | Facility: CLINIC | Age: 70
End: 2020-01-30
Payer: MEDICARE

## 2020-01-30 VITALS
HEIGHT: 62 IN | TEMPERATURE: 98 F | BODY MASS INDEX: 27.36 KG/M2 | RESPIRATION RATE: 16 BRPM | HEART RATE: 92 BPM | WEIGHT: 148.69 LBS | DIASTOLIC BLOOD PRESSURE: 56 MMHG | SYSTOLIC BLOOD PRESSURE: 104 MMHG

## 2020-01-30 DIAGNOSIS — Z12.11 COLON CANCER SCREENING: ICD-10-CM

## 2020-01-30 DIAGNOSIS — I10 ESSENTIAL HYPERTENSION: Primary | ICD-10-CM

## 2020-01-30 DIAGNOSIS — E78.5 HYPERLIPIDEMIA, UNSPECIFIED HYPERLIPIDEMIA TYPE: ICD-10-CM

## 2020-01-30 PROCEDURE — 3078F DIAST BP <80 MM HG: CPT | Mod: HCNC,CPTII,S$GLB, | Performed by: FAMILY MEDICINE

## 2020-01-30 PROCEDURE — 1101F PR PT FALLS ASSESS DOC 0-1 FALLS W/OUT INJ PAST YR: ICD-10-PCS | Mod: HCNC,CPTII,S$GLB, | Performed by: FAMILY MEDICINE

## 2020-01-30 PROCEDURE — 99999 PR PBB SHADOW E&M-EST. PATIENT-LVL III: ICD-10-PCS | Mod: PBBFAC,HCNC,, | Performed by: FAMILY MEDICINE

## 2020-01-30 PROCEDURE — 1159F PR MEDICATION LIST DOCUMENTED IN MEDICAL RECORD: ICD-10-PCS | Mod: HCNC,S$GLB,, | Performed by: FAMILY MEDICINE

## 2020-01-30 PROCEDURE — 99214 OFFICE O/P EST MOD 30 MIN: CPT | Mod: HCNC,S$GLB,, | Performed by: FAMILY MEDICINE

## 2020-01-30 PROCEDURE — 3078F PR MOST RECENT DIASTOLIC BLOOD PRESSURE < 80 MM HG: ICD-10-PCS | Mod: HCNC,CPTII,S$GLB, | Performed by: FAMILY MEDICINE

## 2020-01-30 PROCEDURE — 1101F PT FALLS ASSESS-DOCD LE1/YR: CPT | Mod: HCNC,CPTII,S$GLB, | Performed by: FAMILY MEDICINE

## 2020-01-30 PROCEDURE — 1159F MED LIST DOCD IN RCRD: CPT | Mod: HCNC,S$GLB,, | Performed by: FAMILY MEDICINE

## 2020-01-30 PROCEDURE — 99214 PR OFFICE/OUTPT VISIT, EST, LEVL IV, 30-39 MIN: ICD-10-PCS | Mod: HCNC,S$GLB,, | Performed by: FAMILY MEDICINE

## 2020-01-30 PROCEDURE — 1126F PR PAIN SEVERITY QUANTIFIED, NO PAIN PRESENT: ICD-10-PCS | Mod: HCNC,S$GLB,, | Performed by: FAMILY MEDICINE

## 2020-01-30 PROCEDURE — 1126F AMNT PAIN NOTED NONE PRSNT: CPT | Mod: HCNC,S$GLB,, | Performed by: FAMILY MEDICINE

## 2020-01-30 PROCEDURE — 3074F SYST BP LT 130 MM HG: CPT | Mod: HCNC,CPTII,S$GLB, | Performed by: FAMILY MEDICINE

## 2020-01-30 PROCEDURE — 99999 PR PBB SHADOW E&M-EST. PATIENT-LVL III: CPT | Mod: PBBFAC,HCNC,, | Performed by: FAMILY MEDICINE

## 2020-01-30 PROCEDURE — 3074F PR MOST RECENT SYSTOLIC BLOOD PRESSURE < 130 MM HG: ICD-10-PCS | Mod: HCNC,CPTII,S$GLB, | Performed by: FAMILY MEDICINE

## 2020-01-30 NOTE — PROGRESS NOTES
THIS DOCUMENT WAS MADE IN PART WITH VOICE RECOGNITION SOFTWARE.  OCCASIONALLY THIS SOFTWARE WILL MISINTERPRET WORDS OR PHRASES.      Katy Yarbrough  1950    Katy was seen today for follow-up.    Diagnoses and all orders for this visit:    Essential hypertension  Moved low today but overall average has been very good.  Continue the same medicines, monitor at home.    Hyperlipidemia, unspecified hyperlipidemia type  This is a stable as discussed per.  Continue atorvastatin    Colon cancer screening  -     Fecal Immunochemical Test (iFOBT); Future        Subjective     Chief Complaint   Patient presents with    Follow-up     hypertension        Follow-up   Associated symptoms include arthralgias. Pertinent negatives include no chest pain or headaches.   Hypertension   This is a chronic problem. The current episode started more than 1 year ago. The problem has been gradually improving since onset. The problem is controlled. Pertinent negatives include no anxiety, chest pain or headaches. There are no associated agents to hypertension. Risk factors for coronary artery disease include dyslipidemia. Past treatments include calcium channel blockers and diuretics. The current treatment provides significant improvement. There are no compliance problems.  There is no history of angina.   Hyperlipidemia   This is a chronic problem. The problem is controlled. Recent lipid tests were reviewed and are normal. Pertinent negatives include no chest pain. Current antihyperlipidemic treatment includes statins. The current treatment provides moderate improvement of lipids. There are no compliance problems.  Risk factors for coronary artery disease include hypertension.         HPI elements addressed above in the assessment and plan including problems, diagnosis, stability/instability,  improving/worsening, and chronicity will not be duplicated in this section. Any important additional HPI topics will be discussed here if  needed.    Active Ambulatory Problems     Diagnosis Date Noted    Hypertension     Hyperlipidemia      Resolved Ambulatory Problems     Diagnosis Date Noted    C. difficile diarrhea 01/09/2013    URI (upper respiratory infection) 04/25/2015     Past Medical History:   Diagnosis Date    Anemia     Chronic diarrhea     Over weight      Current Outpatient Medications on File Prior to Visit   Medication Sig Dispense Refill    amLODIPine (NORVASC) 5 MG tablet TAKE 1 TABLET EVERY DAY 90 tablet 1    ascorbic acid (VITAMIN C) 1000 MG tablet Take 1,000 mg by mouth once daily.      atorvastatin (LIPITOR) 40 MG tablet TAKE 1 TABLET EVERY EVENING 90 tablet 1    hydroCHLOROthiazide (MICROZIDE) 12.5 mg capsule TAKE 1 CAPSULE EVERY DAY 90 capsule 1    losartan (COZAAR) 100 MG tablet TAKE 1 TABLET EVERY DAY 90 tablet 1    magnesium 200 mg Tab       naproxen sodium 220 mg Cap       traZODone (DESYREL) 50 MG tablet TAKE 1 TABLET EVERY EVENING 90 tablet 1    TURMERIC ORAL Take 1 Dose by mouth once daily.       No current facility-administered medications on file prior to visit.          Review of Systems   Respiratory: Negative.    Cardiovascular: Negative for chest pain.   Gastrointestinal: Negative.    Musculoskeletal: Positive for arthralgias.   Neurological: Negative for headaches.       Objective     Physical Exam   Constitutional: She is oriented to person, place, and time. She appears well-developed and well-nourished.   HENT:   Head: Normocephalic and atraumatic.   Eyes: Conjunctivae are normal. Right eye exhibits no discharge. Left eye exhibits no discharge. No scleral icterus.   Cardiovascular: Normal rate, regular rhythm and normal heart sounds.   No murmur heard.  Pulmonary/Chest: Effort normal and breath sounds normal. No respiratory distress.   Neurological: She is alert and oriented to person, place, and time.   Skin: Skin is dry. No rash noted. She is not diaphoretic.   Psychiatric: She has a normal  "mood and affect. Her behavior is normal.   Vitals reviewed.    Vitals:    01/30/20 0939   BP: (!) 104/56   BP Location: Right arm   Patient Position: Sitting   BP Method: Medium (Manual)   Pulse: 92   Resp: 16   Temp: 98.3 °F (36.8 °C)   TempSrc: Oral   Weight: 67.4 kg (148 lb 11.2 oz)   Height: 5' 2" (1.575 m)       MOST RECENT LABS IN OUR ELECTRONIC MEDICAL RECORD:     Results for orders placed or performed in visit on 08/07/19   Comprehensive metabolic panel   Result Value Ref Range    Sodium 143 136 - 145 mmol/L    Potassium 4.3 3.5 - 5.1 mmol/L    Chloride 104 95 - 110 mmol/L    CO2 31 (H) 23 - 29 mmol/L    Glucose 88 70 - 110 mg/dL    BUN, Bld 19 8 - 23 mg/dL    Creatinine 0.8 0.5 - 1.4 mg/dL    Calcium 10.2 8.7 - 10.5 mg/dL    Total Protein 7.2 6.0 - 8.4 g/dL    Albumin 3.9 3.5 - 5.2 g/dL    Total Bilirubin 0.5 0.1 - 1.0 mg/dL    Alkaline Phosphatase 48 (L) 55 - 135 U/L    AST 23 10 - 40 U/L    ALT 21 10 - 44 U/L    Anion Gap 8 8 - 16 mmol/L    eGFR if African American >60.0 >60 mL/min/1.73 m^2    eGFR if non African American >60.0 >60 mL/min/1.73 m^2   Lipid panel   Result Value Ref Range    Cholesterol 199 120 - 199 mg/dL    Triglycerides 110 30 - 150 mg/dL    HDL 85 (H) 40 - 75 mg/dL    LDL Cholesterol 92.0 63.0 - 159.0 mg/dL    Hdl/Cholesterol Ratio 42.7 20.0 - 50.0 %    Total Cholesterol/HDL Ratio 2.3 2.0 - 5.0    Non-HDL Cholesterol 114 mg/dL         "

## 2020-02-07 ENCOUNTER — PES CALL (OUTPATIENT)
Dept: ADMINISTRATIVE | Facility: CLINIC | Age: 70
End: 2020-02-07

## 2020-02-10 ENCOUNTER — LAB VISIT (OUTPATIENT)
Dept: LAB | Facility: HOSPITAL | Age: 70
End: 2020-02-10
Attending: FAMILY MEDICINE
Payer: MEDICARE

## 2020-02-10 DIAGNOSIS — Z12.11 COLON CANCER SCREENING: ICD-10-CM

## 2020-02-10 PROCEDURE — 82274 ASSAY TEST FOR BLOOD FECAL: CPT | Mod: HCNC

## 2020-02-11 LAB — HEMOCCULT STL QL IA: NEGATIVE

## 2020-05-06 ENCOUNTER — PATIENT MESSAGE (OUTPATIENT)
Dept: ADMINISTRATIVE | Facility: HOSPITAL | Age: 70
End: 2020-05-06

## 2020-06-30 ENCOUNTER — LAB VISIT (OUTPATIENT)
Dept: PRIMARY CARE CLINIC | Facility: OTHER | Age: 70
End: 2020-06-30
Attending: INTERNAL MEDICINE
Payer: MEDICARE

## 2020-06-30 DIAGNOSIS — Z03.818 ENCOUNTER FOR OBSERVATION FOR SUSPECTED EXPOSURE TO OTHER BIOLOGICAL AGENTS RULED OUT: Primary | ICD-10-CM

## 2020-06-30 PROCEDURE — U0003 INFECTIOUS AGENT DETECTION BY NUCLEIC ACID (DNA OR RNA); SEVERE ACUTE RESPIRATORY SYNDROME CORONAVIRUS 2 (SARS-COV-2) (CORONAVIRUS DISEASE [COVID-19]), AMPLIFIED PROBE TECHNIQUE, MAKING USE OF HIGH THROUGHPUT TECHNOLOGIES AS DESCRIBED BY CMS-2020-01-R: HCPCS | Mod: ST72,HCNC

## 2020-07-02 ENCOUNTER — TELEPHONE (OUTPATIENT)
Dept: PRIMARY CARE CLINIC | Facility: OTHER | Age: 70
End: 2020-07-02

## 2020-07-05 LAB — SARS-COV-2 RNA RESP QL NAA+PROBE: NEGATIVE

## 2020-08-14 ENCOUNTER — LAB VISIT (OUTPATIENT)
Dept: LAB | Facility: HOSPITAL | Age: 70
End: 2020-08-14
Attending: FAMILY MEDICINE
Payer: MEDICARE

## 2020-08-14 DIAGNOSIS — I10 ESSENTIAL HYPERTENSION: ICD-10-CM

## 2020-08-14 DIAGNOSIS — E78.5 HYPERLIPIDEMIA, UNSPECIFIED HYPERLIPIDEMIA TYPE: ICD-10-CM

## 2020-08-14 LAB
ALBUMIN SERPL BCP-MCNC: 4.2 G/DL (ref 3.5–5.2)
ALP SERPL-CCNC: 54 U/L (ref 55–135)
ALT SERPL W/O P-5'-P-CCNC: 22 U/L (ref 10–44)
ANION GAP SERPL CALC-SCNC: 7 MMOL/L (ref 8–16)
AST SERPL-CCNC: 23 U/L (ref 10–40)
BILIRUB SERPL-MCNC: 0.6 MG/DL (ref 0.1–1)
BUN SERPL-MCNC: 17 MG/DL (ref 8–23)
CALCIUM SERPL-MCNC: 10 MG/DL (ref 8.7–10.5)
CHLORIDE SERPL-SCNC: 105 MMOL/L (ref 95–110)
CHOLEST SERPL-MCNC: 168 MG/DL (ref 120–199)
CHOLEST/HDLC SERPL: 2.1 {RATIO} (ref 2–5)
CO2 SERPL-SCNC: 29 MMOL/L (ref 23–29)
CREAT SERPL-MCNC: 0.8 MG/DL (ref 0.5–1.4)
EST. GFR  (AFRICAN AMERICAN): >60 ML/MIN/1.73 M^2
EST. GFR  (NON AFRICAN AMERICAN): >60 ML/MIN/1.73 M^2
GLUCOSE SERPL-MCNC: 89 MG/DL (ref 70–110)
HDLC SERPL-MCNC: 81 MG/DL (ref 40–75)
HDLC SERPL: 48.2 % (ref 20–50)
LDLC SERPL CALC-MCNC: 63.8 MG/DL (ref 63–159)
NONHDLC SERPL-MCNC: 87 MG/DL
POTASSIUM SERPL-SCNC: 3.8 MMOL/L (ref 3.5–5.1)
PROT SERPL-MCNC: 7.2 G/DL (ref 6–8.4)
SODIUM SERPL-SCNC: 141 MMOL/L (ref 136–145)
TRIGL SERPL-MCNC: 116 MG/DL (ref 30–150)
TSH SERPL DL<=0.005 MIU/L-ACNC: 2.25 UIU/ML (ref 0.4–4)

## 2020-08-14 PROCEDURE — 80061 LIPID PANEL: CPT | Mod: HCNC

## 2020-08-14 PROCEDURE — 80053 COMPREHEN METABOLIC PANEL: CPT | Mod: HCNC

## 2020-08-14 PROCEDURE — 36415 COLL VENOUS BLD VENIPUNCTURE: CPT | Mod: HCNC,PN

## 2020-08-14 PROCEDURE — 84443 ASSAY THYROID STIM HORMONE: CPT | Mod: HCNC

## 2020-08-18 ENCOUNTER — PATIENT OUTREACH (OUTPATIENT)
Dept: ADMINISTRATIVE | Facility: HOSPITAL | Age: 70
End: 2020-08-18

## 2020-08-18 NOTE — PROGRESS NOTES
Health Maintenance Due   Topic Date Due    Shingles Vaccine (1 of 2) 2000    Mammogram  2020       Chart review completed 2020.  Care Everywhere updates requested and reviewed.  Immunizations reconciled. Media reports reviewed.  Duplicate HM overrides and  orders removed.  Overdue HM topic chart audit and/or requested.  Overdue lab testing linked to upcoming lab appointments if applies.

## 2020-08-28 ENCOUNTER — TELEPHONE (OUTPATIENT)
Dept: FAMILY MEDICINE | Facility: CLINIC | Age: 70
End: 2020-08-28

## 2020-08-28 NOTE — TELEPHONE ENCOUNTER
Spoke with pt and she states that she was exposed to someone with Covid and wants to reschedule appt. Advised to call back if she develops symptoms so we can get her tested.

## 2020-08-28 NOTE — TELEPHONE ENCOUNTER
----- Message from Pippa Werner sent at 8/28/2020  9:46 AM CDT -----  Type: Needs Medical Advice    Who Called:  Patient  Best Call Back Number: [phone]  Additional Information:  Patient has appointment on Monday, August 31st/stated that she was around someone with covid/needs advice/please call back to advise.

## 2020-09-05 ENCOUNTER — OFFICE VISIT (OUTPATIENT)
Dept: URGENT CARE | Facility: CLINIC | Age: 70
End: 2020-09-05
Payer: MEDICARE

## 2020-09-05 VITALS
BODY MASS INDEX: 27.34 KG/M2 | TEMPERATURE: 98 F | HEIGHT: 62 IN | DIASTOLIC BLOOD PRESSURE: 75 MMHG | HEART RATE: 71 BPM | OXYGEN SATURATION: 95 % | SYSTOLIC BLOOD PRESSURE: 132 MMHG | WEIGHT: 148.56 LBS | RESPIRATION RATE: 18 BRPM

## 2020-09-05 DIAGNOSIS — R21 RASH: Primary | ICD-10-CM

## 2020-09-05 PROCEDURE — 99214 OFFICE O/P EST MOD 30 MIN: CPT | Mod: S$GLB,,, | Performed by: PHYSICIAN ASSISTANT

## 2020-09-05 PROCEDURE — 99214 PR OFFICE/OUTPT VISIT, EST, LEVL IV, 30-39 MIN: ICD-10-PCS | Mod: S$GLB,,, | Performed by: PHYSICIAN ASSISTANT

## 2020-09-05 RX ORDER — NYSTATIN AND TRIAMCINOLONE ACETONIDE 100000; 1 [USP'U]/G; MG/G
CREAM TOPICAL 4 TIMES DAILY
Qty: 60 G | Refills: 0 | Status: SHIPPED | OUTPATIENT
Start: 2020-09-05 | End: 2020-09-25

## 2020-09-05 NOTE — PATIENT INSTRUCTIONS
· Follow up with your primary care if symptoms do not improve, or you may return here at any time.  · If you were referred to a specialist, please follow up with that specialty.  · If you were prescribed antibiotics, please take them to completion.  · If you were prescribed a narcotic or any medication with sedative effects, do not drive or operate heavy equipment or machinery while taking these medications.  · You must understand that you have received treatment at an Urgent Care facility only, and that you may be released before all of your medical problems are known or treated. Urgent Care facilities are not equipped to handle life threatening emergencies. It is recommended that you seek care at an Emergency Department for further evaluation of worsening or concerning symptoms, or possibly life threatening conditions as discussed.                                        If you  smoke, please stop smoking               PLEASE PRACTICE SOCIAL DISTANCING          Candida Skin Infection (Adult)  Candida is type of yeast. It grows naturally on the skin and in the mouth. If it grows out of control, it can cause an infection. Candida can cause infections in the genital area, skin folds, in the mouth, and under the breasts. Anyone can get this infection. It is more common in a person with a weak immune system, such as from diabetes, HIV, or cancer. Its also more common in someone who has been on antibiotic therapy. And its more common people who are overweight or who have incontinence. Wearing tight-fitting clothing and taking part in activities with lots of skin-to-skin contact can also put you at risk.  Candida causes the skin to become bright red and inflamed. The border of the infected part of the skin is often raised. The infection causes pain and itching. Sometimes the skin peels and bleeds. In the mouth, candida is called thrush, and may cause white thickened areas.  A Candida rash is most often treated with an  antifungal cream or ointment. The rash will clear a few days after starting the medicine. Infections that dont go away may need a prescription medicine. In rare cases, a bacterial infection can also occur.  Home care  Your healthcare provider will recommend an antifungal cream or ointment for the rash. He or she may also prescribe a medicine for the itch. Follow all instructions for using these medicines. Dont use cornstarch powder. Cornstarch can cause the Candida infection to get worse.  General care:  · Keep your skin clean by washing the area twice a day.  · Use the cream as directed until your rash is gone. Once the skin has healed, keep it dry to prevent another infection.   · If you are overweight, talk with your healthcare provider about a plan to lose excess weight.  · Avoid clothes that fit tightly.  Follow-up care  Follow up with your healthcare provider, or as advised. Your rash will clear in 7 to 14 days. Call your healthcare provider if the rash is not gone after 14 days.  When to seek medical advice  Call your healthcare provider right away if any of these occur:  · Pain or redness that gets worse or spreads  · Fluid coming from the skin  · Yellow crusts on the skin  · Fever of 100.4°F (38°C) or higher, or as directed by your healthcare provider  Date Last Reviewed: 9/1/2016 © 2000-2017 [employer] [address] [place] [address], [place] [address]. All rights reserved. This information is not intended as a substitute for professional medical care. Always follow your healthcare professional's instructions.

## 2020-09-09 ENCOUNTER — TELEPHONE (OUTPATIENT)
Dept: FAMILY MEDICINE | Facility: CLINIC | Age: 70
End: 2020-09-09

## 2020-09-09 DIAGNOSIS — L23.7 POISON IVY: Primary | ICD-10-CM

## 2020-09-09 RX ORDER — METHYLPREDNISOLONE 4 MG/1
TABLET ORAL
Qty: 1 PACKAGE | Refills: 0 | Status: SHIPPED | OUTPATIENT
Start: 2020-09-09 | End: 2020-09-25

## 2020-09-09 NOTE — TELEPHONE ENCOUNTER
Pt was seen at [facility] on 9/5 for poison ivy, she was given mycolog cream with some improvement but not completely resolved. She is asking for something else to be sent in for her.

## 2020-09-09 NOTE — TELEPHONE ENCOUNTER
----- Message from Graciela Stewart sent at 9/9/2020  9:23 AM CDT -----  Type: Needs Medical Advice  Who Called:  Patient  Symptoms (please be specific):  poison ivy  Pharmacy name and phone #:    [id number] - [place] - [address]  [address] [facility] [address]  [facility] [address]  Phone: [phone] Fax: [phone]  Best Call Back Number: [phone]  Additional Information: Calling to find out if she can get something more called in to help with the poison ivy on her hand. She visited urgent care for it and was given medication that helped some but it has not fully gone away.

## 2020-09-09 NOTE — TELEPHONE ENCOUNTER
I will send in a Medrol Dosepak, if she has any sign of infection or fever she needs to let us know.  Please discuss precautions with steroids.  She should not take any NSAIDs such as her naproxen while she is on this.  Also she may want to be proactive and take Prilosec or similar for a few weeks just to reduce the risk of gastritis/stomach ulcer.

## 2020-09-10 ENCOUNTER — PATIENT OUTREACH (OUTPATIENT)
Dept: ADMINISTRATIVE | Facility: HOSPITAL | Age: 70
End: 2020-09-10

## 2020-09-10 NOTE — PROGRESS NOTES
Health Maintenance Due   Topic Date Due    Shingles Vaccine (1 of 2) 2000    Mammogram  2020    Influenza Vaccine (1) 2020       Chart review completed 09/10/2020.  Care Everywhere updates requested and reviewed.  Immunizations reconciled. Media reports reviewed.  Duplicate HM overrides and  orders removed.  Overdue HM topic chart audit and/or requested.  Overdue lab testing linked to upcoming lab appointments if applies.

## 2020-09-25 ENCOUNTER — OFFICE VISIT (OUTPATIENT)
Dept: FAMILY MEDICINE | Facility: CLINIC | Age: 70
End: 2020-09-25
Payer: MEDICARE

## 2020-09-25 VITALS
WEIGHT: 155 LBS | DIASTOLIC BLOOD PRESSURE: 68 MMHG | OXYGEN SATURATION: 97 % | BODY MASS INDEX: 28.52 KG/M2 | TEMPERATURE: 98 F | HEIGHT: 62 IN | HEART RATE: 84 BPM | SYSTOLIC BLOOD PRESSURE: 122 MMHG

## 2020-09-25 DIAGNOSIS — L23.7 POISON IVY: ICD-10-CM

## 2020-09-25 DIAGNOSIS — E78.5 HYPERLIPIDEMIA, UNSPECIFIED HYPERLIPIDEMIA TYPE: ICD-10-CM

## 2020-09-25 DIAGNOSIS — I10 ESSENTIAL HYPERTENSION: Primary | ICD-10-CM

## 2020-09-25 PROCEDURE — 3074F SYST BP LT 130 MM HG: CPT | Mod: HCNC,CPTII,S$GLB, | Performed by: FAMILY MEDICINE

## 2020-09-25 PROCEDURE — 1159F MED LIST DOCD IN RCRD: CPT | Mod: HCNC,S$GLB,, | Performed by: FAMILY MEDICINE

## 2020-09-25 PROCEDURE — 99999 PR PBB SHADOW E&M-EST. PATIENT-LVL IV: ICD-10-PCS | Mod: PBBFAC,HCNC,, | Performed by: FAMILY MEDICINE

## 2020-09-25 PROCEDURE — 3074F PR MOST RECENT SYSTOLIC BLOOD PRESSURE < 130 MM HG: ICD-10-PCS | Mod: HCNC,CPTII,S$GLB, | Performed by: FAMILY MEDICINE

## 2020-09-25 PROCEDURE — 90694 FLU VACCINE - QUADRIVALENT - ADJUVANTED: ICD-10-PCS | Mod: HCNC,S$GLB,, | Performed by: FAMILY MEDICINE

## 2020-09-25 PROCEDURE — 1159F PR MEDICATION LIST DOCUMENTED IN MEDICAL RECORD: ICD-10-PCS | Mod: HCNC,S$GLB,, | Performed by: FAMILY MEDICINE

## 2020-09-25 PROCEDURE — 1101F PT FALLS ASSESS-DOCD LE1/YR: CPT | Mod: HCNC,CPTII,S$GLB, | Performed by: FAMILY MEDICINE

## 2020-09-25 PROCEDURE — 3008F PR BODY MASS INDEX (BMI) DOCUMENTED: ICD-10-PCS | Mod: HCNC,CPTII,S$GLB, | Performed by: FAMILY MEDICINE

## 2020-09-25 PROCEDURE — 99214 OFFICE O/P EST MOD 30 MIN: CPT | Mod: 25,HCNC,S$GLB, | Performed by: FAMILY MEDICINE

## 2020-09-25 PROCEDURE — 1101F PR PT FALLS ASSESS DOC 0-1 FALLS W/OUT INJ PAST YR: ICD-10-PCS | Mod: HCNC,CPTII,S$GLB, | Performed by: FAMILY MEDICINE

## 2020-09-25 PROCEDURE — G0008 PR ADMIN INFLUENZA VIRUS VAC: ICD-10-PCS | Mod: HCNC,S$GLB,, | Performed by: FAMILY MEDICINE

## 2020-09-25 PROCEDURE — 3078F DIAST BP <80 MM HG: CPT | Mod: HCNC,CPTII,S$GLB, | Performed by: FAMILY MEDICINE

## 2020-09-25 PROCEDURE — 99214 PR OFFICE/OUTPT VISIT, EST, LEVL IV, 30-39 MIN: ICD-10-PCS | Mod: 25,HCNC,S$GLB, | Performed by: FAMILY MEDICINE

## 2020-09-25 PROCEDURE — 3008F BODY MASS INDEX DOCD: CPT | Mod: HCNC,CPTII,S$GLB, | Performed by: FAMILY MEDICINE

## 2020-09-25 PROCEDURE — 3078F PR MOST RECENT DIASTOLIC BLOOD PRESSURE < 80 MM HG: ICD-10-PCS | Mod: HCNC,CPTII,S$GLB, | Performed by: FAMILY MEDICINE

## 2020-09-25 PROCEDURE — 99999 PR PBB SHADOW E&M-EST. PATIENT-LVL IV: CPT | Mod: PBBFAC,HCNC,, | Performed by: FAMILY MEDICINE

## 2020-09-25 PROCEDURE — 90694 VACC AIIV4 NO PRSRV 0.5ML IM: CPT | Mod: HCNC,S$GLB,, | Performed by: FAMILY MEDICINE

## 2020-09-25 PROCEDURE — G0008 ADMIN INFLUENZA VIRUS VAC: HCPCS | Mod: HCNC,S$GLB,, | Performed by: FAMILY MEDICINE

## 2020-09-25 NOTE — PROGRESS NOTES
THIS DOCUMENT WAS MADE IN PART WITH VOICE RECOGNITION SOFTWARE.  OCCASIONALLY THIS SOFTWARE WILL MISINTERPRET WORDS OR PHRASES.      Katy Yarbrough  1950    Katy was seen today for annual exam.    Diagnoses and all orders for this visit:    Essential hypertension    Hyperlipidemia, unspecified hyperlipidemia type    Poison ivy    Other orders  -     Influenza - Quadrivalent (Adjuvanted)          Subjective     Chief Complaint   Patient presents with    Annual Exam     review of labs       HPI    Overall doing well.  Hypertension has been stable and satisfactory.  No medication problems.  No swelling no headaches.    Hyperlipidemia chronic reasonably stable and satisfactory.  No side effects or difficulty from the atorvastatin.    Recent rash on her arms.  After working in her garden.  Went to urgent care, topical creams did not help, she did contact us and reported significant improvement after the Medrol Dosepak.      Active Ambulatory Problems     Diagnosis Date Noted    Hypertension     Hyperlipidemia      Resolved Ambulatory Problems     Diagnosis Date Noted    C. difficile diarrhea 01/09/2013    URI (upper respiratory infection) 04/25/2015     Past Medical History:   Diagnosis Date    Anemia     Chronic diarrhea     Over weight          Review of Systems   Constitutional: Positive for activity change. Negative for fever and unexpected weight change.   HENT: Negative.    Eyes: Negative.    Respiratory: Negative.    Cardiovascular: Negative for chest pain.   Gastrointestinal: Negative.    Endocrine: Negative.    Genitourinary: Negative.    Musculoskeletal: Positive for arthralgias.   Neurological: Negative for headaches.       Objective     Physical Exam  Vitals signs reviewed.   Constitutional:       Appearance: She is well-developed. She is not diaphoretic.   HENT:      Head: Normocephalic and atraumatic.      Right Ear: Tympanic membrane, ear canal and external ear normal. There is no impacted  "cerumen.      Left Ear: Tympanic membrane, ear canal and external ear normal. There is no impacted cerumen.      Nose: Nose normal.      Mouth/Throat:      Pharynx: No oropharyngeal exudate or posterior oropharyngeal erythema.   Eyes:      General: No scleral icterus.        Right eye: No discharge.         Left eye: No discharge.      Conjunctiva/sclera: Conjunctivae normal.   Cardiovascular:      Rate and Rhythm: Normal rate and regular rhythm.      Heart sounds: Normal heart sounds. No murmur.   Pulmonary:      Effort: Pulmonary effort is normal. No respiratory distress.      Breath sounds: Normal breath sounds.   Abdominal:      General: Abdomen is flat. Bowel sounds are normal. There is no distension.      Palpations: There is no mass.      Tenderness: There is no abdominal tenderness. There is no guarding.   Skin:     General: Skin is dry.      Findings: No rash.   Neurological:      Mental Status: She is alert and oriented to person, place, and time.   Psychiatric:         Behavior: Behavior normal.       Vitals:    09/25/20 0758   BP: 122/68   BP Location: Left arm   Patient Position: Sitting   BP Method: Medium (Manual)   Pulse: 84   Temp: 97.9 °F (36.6 °C)   TempSrc: Skin   SpO2: 97%   Weight: 70.3 kg (154 lb 15.7 oz)   Height: 5' 2" (1.575 m)       MOST RECENT LABS IN OUR ELECTRONIC MEDICAL RECORD:     Results for orders placed or performed in visit on 08/14/20   Comprehensive metabolic panel   Result Value Ref Range    Sodium 141 136 - 145 mmol/L    Potassium 3.8 3.5 - 5.1 mmol/L    Chloride 105 95 - 110 mmol/L    CO2 29 23 - 29 mmol/L    Glucose 89 70 - 110 mg/dL    BUN, Bld 17 8 - 23 mg/dL    Creatinine 0.8 0.5 - 1.4 mg/dL    Calcium 10.0 8.7 - 10.5 mg/dL    Total Protein 7.2 6.0 - 8.4 g/dL    Albumin 4.2 3.5 - 5.2 g/dL    Total Bilirubin 0.6 0.1 - 1.0 mg/dL    Alkaline Phosphatase 54 (L) 55 - 135 U/L    AST 23 10 - 40 U/L    ALT 22 10 - 44 U/L    Anion Gap 7 (L) 8 - 16 mmol/L    eGFR if African " American >60.0 >60 mL/min/1.73 m^2    eGFR if non African American >60.0 >60 mL/min/1.73 m^2   TSH   Result Value Ref Range    TSH 2.253 0.400 - 4.000 uIU/mL   Lipid panel   Result Value Ref Range    Cholesterol 168 120 - 199 mg/dL    Triglycerides 116 30 - 150 mg/dL    HDL 81 (H) 40 - 75 mg/dL    LDL Cholesterol 63.8 63.0 - 159.0 mg/dL    Hdl/Cholesterol Ratio 48.2 20.0 - 50.0 %    Total Cholesterol/HDL Ratio 2.1 2.0 - 5.0    Non-HDL Cholesterol 87 mg/dL

## 2020-09-29 ENCOUNTER — PATIENT MESSAGE (OUTPATIENT)
Dept: OTHER | Facility: OTHER | Age: 70
End: 2020-09-29

## 2020-12-02 ENCOUNTER — PATIENT MESSAGE (OUTPATIENT)
Dept: ADMINISTRATIVE | Facility: HOSPITAL | Age: 70
End: 2020-12-02

## 2020-12-03 ENCOUNTER — PATIENT OUTREACH (OUTPATIENT)
Dept: ADMINISTRATIVE | Facility: HOSPITAL | Age: 70
End: 2020-12-03

## 2020-12-11 ENCOUNTER — PATIENT MESSAGE (OUTPATIENT)
Dept: OTHER | Facility: OTHER | Age: 70
End: 2020-12-11

## 2021-01-07 ENCOUNTER — IMMUNIZATION (OUTPATIENT)
Dept: PHARMACY | Facility: CLINIC | Age: 71
End: 2021-01-07
Payer: MEDICARE

## 2021-01-07 DIAGNOSIS — Z23 NEED FOR VACCINATION: ICD-10-CM

## 2021-02-04 ENCOUNTER — IMMUNIZATION (OUTPATIENT)
Dept: PHARMACY | Facility: CLINIC | Age: 71
End: 2021-02-04
Payer: MEDICARE

## 2021-02-04 DIAGNOSIS — Z23 NEED FOR VACCINATION: Primary | ICD-10-CM

## 2021-03-02 ENCOUNTER — TELEPHONE (OUTPATIENT)
Dept: PRIMARY CARE CLINIC | Facility: CLINIC | Age: 71
End: 2021-03-02

## 2021-03-22 ENCOUNTER — OFFICE VISIT (OUTPATIENT)
Dept: PRIMARY CARE CLINIC | Facility: CLINIC | Age: 71
End: 2021-03-22
Payer: MEDICARE

## 2021-03-22 VITALS
TEMPERATURE: 98 F | SYSTOLIC BLOOD PRESSURE: 126 MMHG | RESPIRATION RATE: 18 BRPM | BODY MASS INDEX: 29.83 KG/M2 | OXYGEN SATURATION: 98 % | WEIGHT: 162.13 LBS | DIASTOLIC BLOOD PRESSURE: 84 MMHG | HEART RATE: 79 BPM | HEIGHT: 62 IN

## 2021-03-22 DIAGNOSIS — E78.5 HYPERLIPIDEMIA, UNSPECIFIED HYPERLIPIDEMIA TYPE: Chronic | ICD-10-CM

## 2021-03-22 DIAGNOSIS — I10 ESSENTIAL HYPERTENSION: Primary | Chronic | ICD-10-CM

## 2021-03-22 DIAGNOSIS — Z12.11 COLON CANCER SCREENING: ICD-10-CM

## 2021-03-22 PROCEDURE — 3079F PR MOST RECENT DIASTOLIC BLOOD PRESSURE 80-89 MM HG: ICD-10-PCS | Mod: CPTII,S$GLB,, | Performed by: FAMILY MEDICINE

## 2021-03-22 PROCEDURE — 3074F PR MOST RECENT SYSTOLIC BLOOD PRESSURE < 130 MM HG: ICD-10-PCS | Mod: CPTII,S$GLB,, | Performed by: FAMILY MEDICINE

## 2021-03-22 PROCEDURE — 1126F PR PAIN SEVERITY QUANTIFIED, NO PAIN PRESENT: ICD-10-PCS | Mod: S$GLB,,, | Performed by: FAMILY MEDICINE

## 2021-03-22 PROCEDURE — 99999 PR PBB SHADOW E&M-EST. PATIENT-LVL IV: CPT | Mod: PBBFAC,,, | Performed by: FAMILY MEDICINE

## 2021-03-22 PROCEDURE — 99214 OFFICE O/P EST MOD 30 MIN: CPT | Mod: S$GLB,,, | Performed by: FAMILY MEDICINE

## 2021-03-22 PROCEDURE — 3288F FALL RISK ASSESSMENT DOCD: CPT | Mod: CPTII,S$GLB,, | Performed by: FAMILY MEDICINE

## 2021-03-22 PROCEDURE — 1159F MED LIST DOCD IN RCRD: CPT | Mod: S$GLB,,, | Performed by: FAMILY MEDICINE

## 2021-03-22 PROCEDURE — 3074F SYST BP LT 130 MM HG: CPT | Mod: CPTII,S$GLB,, | Performed by: FAMILY MEDICINE

## 2021-03-22 PROCEDURE — 3288F PR FALLS RISK ASSESSMENT DOCUMENTED: ICD-10-PCS | Mod: CPTII,S$GLB,, | Performed by: FAMILY MEDICINE

## 2021-03-22 PROCEDURE — 1126F AMNT PAIN NOTED NONE PRSNT: CPT | Mod: S$GLB,,, | Performed by: FAMILY MEDICINE

## 2021-03-22 PROCEDURE — 1101F PR PT FALLS ASSESS DOC 0-1 FALLS W/OUT INJ PAST YR: ICD-10-PCS | Mod: CPTII,S$GLB,, | Performed by: FAMILY MEDICINE

## 2021-03-22 PROCEDURE — 1101F PT FALLS ASSESS-DOCD LE1/YR: CPT | Mod: CPTII,S$GLB,, | Performed by: FAMILY MEDICINE

## 2021-03-22 PROCEDURE — 1159F PR MEDICATION LIST DOCUMENTED IN MEDICAL RECORD: ICD-10-PCS | Mod: S$GLB,,, | Performed by: FAMILY MEDICINE

## 2021-03-22 PROCEDURE — 3079F DIAST BP 80-89 MM HG: CPT | Mod: CPTII,S$GLB,, | Performed by: FAMILY MEDICINE

## 2021-03-22 PROCEDURE — 99999 PR PBB SHADOW E&M-EST. PATIENT-LVL IV: ICD-10-PCS | Mod: PBBFAC,,, | Performed by: FAMILY MEDICINE

## 2021-03-22 PROCEDURE — 3008F PR BODY MASS INDEX (BMI) DOCUMENTED: ICD-10-PCS | Mod: CPTII,S$GLB,, | Performed by: FAMILY MEDICINE

## 2021-03-22 PROCEDURE — 99214 PR OFFICE/OUTPT VISIT, EST, LEVL IV, 30-39 MIN: ICD-10-PCS | Mod: S$GLB,,, | Performed by: FAMILY MEDICINE

## 2021-03-22 PROCEDURE — 3008F BODY MASS INDEX DOCD: CPT | Mod: CPTII,S$GLB,, | Performed by: FAMILY MEDICINE

## 2021-03-22 PROCEDURE — 99499 UNLISTED E&M SERVICE: CPT | Mod: S$GLB,,, | Performed by: FAMILY MEDICINE

## 2021-03-22 PROCEDURE — 99499 RISK ADDL DX/OHS AUDIT: ICD-10-PCS | Mod: S$GLB,,, | Performed by: FAMILY MEDICINE

## 2021-03-29 ENCOUNTER — LAB VISIT (OUTPATIENT)
Dept: LAB | Facility: HOSPITAL | Age: 71
End: 2021-03-29
Attending: FAMILY MEDICINE
Payer: MEDICARE

## 2021-03-29 DIAGNOSIS — I10 ESSENTIAL HYPERTENSION: Chronic | ICD-10-CM

## 2021-03-29 DIAGNOSIS — E78.5 HYPERLIPIDEMIA, UNSPECIFIED HYPERLIPIDEMIA TYPE: Chronic | ICD-10-CM

## 2021-03-29 LAB
ALBUMIN SERPL BCP-MCNC: 3.9 G/DL (ref 3.5–5.2)
ALP SERPL-CCNC: 56 U/L (ref 55–135)
ALT SERPL W/O P-5'-P-CCNC: 22 U/L (ref 10–44)
ANION GAP SERPL CALC-SCNC: 8 MMOL/L (ref 8–16)
AST SERPL-CCNC: 21 U/L (ref 10–40)
BASOPHILS # BLD AUTO: 0.04 K/UL (ref 0–0.2)
BASOPHILS NFR BLD: 0.7 % (ref 0–1.9)
BILIRUB SERPL-MCNC: 0.5 MG/DL (ref 0.1–1)
BUN SERPL-MCNC: 16 MG/DL (ref 8–23)
CALCIUM SERPL-MCNC: 9.4 MG/DL (ref 8.7–10.5)
CHLORIDE SERPL-SCNC: 103 MMOL/L (ref 95–110)
CHOLEST SERPL-MCNC: 172 MG/DL (ref 120–199)
CHOLEST/HDLC SERPL: 2.3 {RATIO} (ref 2–5)
CO2 SERPL-SCNC: 30 MMOL/L (ref 23–29)
CREAT SERPL-MCNC: 0.8 MG/DL (ref 0.5–1.4)
DIFFERENTIAL METHOD: NORMAL
EOSINOPHIL # BLD AUTO: 0.3 K/UL (ref 0–0.5)
EOSINOPHIL NFR BLD: 5.2 % (ref 0–8)
ERYTHROCYTE [DISTWIDTH] IN BLOOD BY AUTOMATED COUNT: 12.7 % (ref 11.5–14.5)
EST. GFR  (AFRICAN AMERICAN): >60 ML/MIN/1.73 M^2
EST. GFR  (NON AFRICAN AMERICAN): >60 ML/MIN/1.73 M^2
GLUCOSE SERPL-MCNC: 86 MG/DL (ref 70–110)
HCT VFR BLD AUTO: 45.5 % (ref 37–48.5)
HDLC SERPL-MCNC: 74 MG/DL (ref 40–75)
HDLC SERPL: 43 % (ref 20–50)
HGB BLD-MCNC: 15 G/DL (ref 12–16)
IMM GRANULOCYTES # BLD AUTO: 0.01 K/UL (ref 0–0.04)
IMM GRANULOCYTES NFR BLD AUTO: 0.2 % (ref 0–0.5)
LDLC SERPL CALC-MCNC: 69.4 MG/DL (ref 63–159)
LYMPHOCYTES # BLD AUTO: 2.1 K/UL (ref 1–4.8)
LYMPHOCYTES NFR BLD: 35.9 % (ref 18–48)
MCH RBC QN AUTO: 30.8 PG (ref 27–31)
MCHC RBC AUTO-ENTMCNC: 33 G/DL (ref 32–36)
MCV RBC AUTO: 93 FL (ref 82–98)
MONOCYTES # BLD AUTO: 0.4 K/UL (ref 0.3–1)
MONOCYTES NFR BLD: 6.9 % (ref 4–15)
NEUTROPHILS # BLD AUTO: 3 K/UL (ref 1.8–7.7)
NEUTROPHILS NFR BLD: 51.1 % (ref 38–73)
NONHDLC SERPL-MCNC: 98 MG/DL
NRBC BLD-RTO: 0 /100 WBC
PLATELET # BLD AUTO: 247 K/UL (ref 150–450)
PMV BLD AUTO: 10.7 FL (ref 9.2–12.9)
POTASSIUM SERPL-SCNC: 4.1 MMOL/L (ref 3.5–5.1)
PROT SERPL-MCNC: 7.2 G/DL (ref 6–8.4)
RBC # BLD AUTO: 4.87 M/UL (ref 4–5.4)
SODIUM SERPL-SCNC: 141 MMOL/L (ref 136–145)
TRIGL SERPL-MCNC: 143 MG/DL (ref 30–150)
WBC # BLD AUTO: 5.93 K/UL (ref 3.9–12.7)

## 2021-03-29 PROCEDURE — 36415 COLL VENOUS BLD VENIPUNCTURE: CPT | Mod: PN | Performed by: FAMILY MEDICINE

## 2021-03-29 PROCEDURE — 85025 COMPLETE CBC W/AUTO DIFF WBC: CPT | Performed by: FAMILY MEDICINE

## 2021-03-29 PROCEDURE — 80053 COMPREHEN METABOLIC PANEL: CPT | Performed by: FAMILY MEDICINE

## 2021-03-29 PROCEDURE — 80061 LIPID PANEL: CPT | Performed by: FAMILY MEDICINE

## 2021-04-06 ENCOUNTER — PATIENT MESSAGE (OUTPATIENT)
Dept: ADMINISTRATIVE | Facility: HOSPITAL | Age: 71
End: 2021-04-06

## 2021-06-01 NOTE — PROGRESS NOTES
"Subjective:       Patient ID: Katy Yarbrough is a 70 y.o. female.    Vitals:  height is 5' 2" (1.575 m) and weight is 67.4 kg (148 lb 9.4 oz). Her temperature is 97.7 °F (36.5 °C). Her blood pressure is 132/75 and her pulse is 71. Her respiration is 18 and oxygen saturation is 95%.     Chief Complaint: Rash    Rash  This is a new problem. The current episode started in the past 7 days. The problem is unchanged. The affected locations include the abdomen. The rash is characterized by itchiness and redness. She was exposed to plant contact. Pertinent negatives include no cough, fever or sore throat. Past treatments include anti-itch cream. The treatment provided mild relief.       Constitution: Negative for chills and fever.   HENT: Negative for facial swelling and sore throat.    Neck: Negative for painful lymph nodes.   Eyes: Negative for eye itching and eyelid swelling.   Respiratory: Negative for cough.    Musculoskeletal: Negative for joint pain and joint swelling.   Skin: Positive for rash. Negative for color change, pale, wound, abrasion, laceration, lesion, skin thickening/induration, puncture wound, erythema, bruising, abscess, avulsion and hives.   Allergic/Immunologic: Positive for itching. Negative for environmental allergies, immunocompromised state and hives.   Hematologic/Lymphatic: Negative for swollen lymph nodes.       Objective:      Physical Exam   Constitutional: She is oriented to person, place, and time. She appears well-developed.   HENT:   Head: Normocephalic and atraumatic. Head is without abrasion, without contusion and without laceration.   Ears:   Right Ear: External ear normal.   Left Ear: External ear normal.   Nose: Nose normal.   Mouth/Throat: Oropharynx is clear and moist and mucous membranes are normal.   Eyes: Pupils are equal, round, and reactive to light. Conjunctivae, EOM and lids are normal.   Neck: Trachea normal, full passive range of motion without pain and phonation normal. " Case Management Discharge Note      Final Note: Discharged to [place], [place] medicaid bed on 6/1/21 by [facility]/EMS. OZZY Rivera RN CCP.         Selected Continued Care - Admitted Since 5/29/2021     Destination Coordination complete    Service Provider Selected Services Address Phone Fax Patient Preferred    [facility]  Intermediate Care [address] [address] [phone] --          Durable Medical Equipment    No services have been selected for the patient.              Dialysis/Infusion    No services have been selected for the patient.              Home Medical Care    No services have been selected for the patient.              Therapy    No services have been selected for the patient.              Community Resources    No services have been selected for the patient.                Selected Continued Care - Prior Encounters Includes selections from prior encounters from 2/28/2021 to 6/1/2021    Discharged on 3/2/2021 Admission date: 2/24/2021 - Discharge disposition: Skilled Nursing Facility (DC - External)    Destination     Service Provider Selected Services Address Phone Fax Patient Preferred    [facility]  Skilled Nursing [address] [address] [phone] --                    Transportation Services  Ambulance: [facility]    Final Discharge Disposition Code: 04 - intermediate care facility   Neck supple.   Cardiovascular: Normal rate, regular rhythm and normal heart sounds.   Pulmonary/Chest: Effort normal and breath sounds normal. No stridor. No respiratory distress.   Musculoskeletal: Normal range of motion.   Neurological: She is alert and oriented to person, place, and time.   Skin: Skin is warm, dry, intact and rash. Capillary refill takes less than 2 seconds. abrasion, burn, bruising, erythema and ecchymosis     Psychiatric: Her speech is normal and behavior is normal. Judgment and thought content normal.   Nursing note and vitals reviewed.        Assessment:       1. Rash        Plan:       All hx was provided by the pt or available as part of established EMR. The pt past medical hx, family hx, social hx, and current medications were reviewed. Tx options discussed. Pt to follow up with [facility] if no improvement or for any concern, and seek treatment in an ER for worsening. Pt voiced understanding of all discussed, and agreed with decision making.    Rash  -     nystatin-triamcinolone (MYCOLOG II) cream; Apply topically 4 (four) times daily.  Dispense: 60 g; Refill: 0      Patient Instructions   · Follow up with your primary care if symptoms do not improve, or you may return here at any time.  · If you were referred to a specialist, please follow up with that specialty.  · If you were prescribed antibiotics, please take them to completion.  · If you were prescribed a narcotic or any medication with sedative effects, do not drive or operate heavy equipment or machinery while taking these medications.  · You must understand that you have received treatment at an Urgent Care facility only, and that you may be released before all of your medical problems are known or treated. Urgent Care facilities are not equipped to handle life threatening emergencies. It is recommended that you seek care at an Emergency Department for further evaluation of worsening or concerning symptoms, or possibly life threatening  conditions as discussed.                                        If you  smoke, please stop smoking               PLEASE PRACTICE SOCIAL DISTANCING          Candida Skin Infection (Adult)  Candida is type of yeast. It grows naturally on the skin and in the mouth. If it grows out of control, it can cause an infection. Candida can cause infections in the genital area, skin folds, in the mouth, and under the breasts. Anyone can get this infection. It is more common in a person with a weak immune system, such as from diabetes, HIV, or cancer. Its also more common in someone who has been on antibiotic therapy. And its more common people who are overweight or who have incontinence. Wearing tight-fitting clothing and taking part in activities with lots of skin-to-skin contact can also put you at risk.  Candida causes the skin to become bright red and inflamed. The border of the infected part of the skin is often raised. The infection causes pain and itching. Sometimes the skin peels and bleeds. In the mouth, candida is called thrush, and may cause white thickened areas.  A Candida rash is most often treated with an antifungal cream or ointment. The rash will clear a few days after starting the medicine. Infections that dont go away may need a prescription medicine. In rare cases, a bacterial infection can also occur.  Home care  Your healthcare provider will recommend an antifungal cream or ointment for the rash. He or she may also prescribe a medicine for the itch. Follow all instructions for using these medicines. Dont use cornstarch powder. Cornstarch can cause the Candida infection to get worse.  General care:  · Keep your skin clean by washing the area twice a day.  · Use the cream as directed until your rash is gone. Once the skin has healed, keep it dry to prevent another infection.   · If you are overweight, talk with your healthcare provider about a plan to lose excess weight.  · Avoid clothes that fit  tightly.  Follow-up care  Follow up with your healthcare provider, or as advised. Your rash will clear in 7 to 14 days. Call your healthcare provider if the rash is not gone after 14 days.  When to seek medical advice  Call your healthcare provider right away if any of these occur:  · Pain or redness that gets worse or spreads  · Fluid coming from the skin  · Yellow crusts on the skin  · Fever of 100.4°F (38°C) or higher, or as directed by your healthcare provider  Date Last Reviewed: 9/1/2016 © 2000-2017 [employer]. [address] [place] [id number]. All rights reserved. This information is not intended as a substitute for professional medical care. Always follow your healthcare professional's instructions.

## 2021-07-07 ENCOUNTER — PATIENT MESSAGE (OUTPATIENT)
Dept: ADMINISTRATIVE | Facility: HOSPITAL | Age: 71
End: 2021-07-07

## 2021-09-13 ENCOUNTER — LAB VISIT (OUTPATIENT)
Dept: LAB | Facility: HOSPITAL | Age: 71
End: 2021-09-13
Attending: FAMILY MEDICINE
Payer: MEDICARE

## 2021-09-13 DIAGNOSIS — Z12.11 COLON CANCER SCREENING: ICD-10-CM

## 2021-09-13 PROCEDURE — 82274 ASSAY TEST FOR BLOOD FECAL: CPT | Mod: HCNC | Performed by: FAMILY MEDICINE

## 2021-09-21 LAB — HEMOCCULT STL QL IA: NEGATIVE

## 2021-09-22 ENCOUNTER — OFFICE VISIT (OUTPATIENT)
Dept: PRIMARY CARE CLINIC | Facility: CLINIC | Age: 71
End: 2021-09-22
Payer: MEDICARE

## 2021-09-22 VITALS
RESPIRATION RATE: 18 BRPM | BODY MASS INDEX: 30.61 KG/M2 | WEIGHT: 166.31 LBS | HEIGHT: 62 IN | SYSTOLIC BLOOD PRESSURE: 114 MMHG | DIASTOLIC BLOOD PRESSURE: 68 MMHG | HEART RATE: 60 BPM | OXYGEN SATURATION: 97 %

## 2021-09-22 DIAGNOSIS — E78.5 HYPERLIPIDEMIA, UNSPECIFIED HYPERLIPIDEMIA TYPE: ICD-10-CM

## 2021-09-22 DIAGNOSIS — I10 ESSENTIAL HYPERTENSION: Primary | ICD-10-CM

## 2021-09-22 DIAGNOSIS — Z78.0 POST-MENOPAUSAL: ICD-10-CM

## 2021-09-22 PROCEDURE — 1159F PR MEDICATION LIST DOCUMENTED IN MEDICAL RECORD: ICD-10-PCS | Mod: HCNC,CPTII,S$GLB, | Performed by: FAMILY MEDICINE

## 2021-09-22 PROCEDURE — 99999 PR PBB SHADOW E&M-EST. PATIENT-LVL IV: CPT | Mod: PBBFAC,HCNC,, | Performed by: FAMILY MEDICINE

## 2021-09-22 PROCEDURE — 3008F PR BODY MASS INDEX (BMI) DOCUMENTED: ICD-10-PCS | Mod: HCNC,CPTII,S$GLB, | Performed by: FAMILY MEDICINE

## 2021-09-22 PROCEDURE — 1126F AMNT PAIN NOTED NONE PRSNT: CPT | Mod: HCNC,CPTII,S$GLB, | Performed by: FAMILY MEDICINE

## 2021-09-22 PROCEDURE — 1160F PR REVIEW ALL MEDS BY PRESCRIBER/CLIN PHARMACIST DOCUMENTED: ICD-10-PCS | Mod: HCNC,CPTII,S$GLB, | Performed by: FAMILY MEDICINE

## 2021-09-22 PROCEDURE — 3078F DIAST BP <80 MM HG: CPT | Mod: HCNC,CPTII,S$GLB, | Performed by: FAMILY MEDICINE

## 2021-09-22 PROCEDURE — 1126F PR PAIN SEVERITY QUANTIFIED, NO PAIN PRESENT: ICD-10-PCS | Mod: HCNC,CPTII,S$GLB, | Performed by: FAMILY MEDICINE

## 2021-09-22 PROCEDURE — 3008F BODY MASS INDEX DOCD: CPT | Mod: HCNC,CPTII,S$GLB, | Performed by: FAMILY MEDICINE

## 2021-09-22 PROCEDURE — 99999 PR PBB SHADOW E&M-EST. PATIENT-LVL IV: ICD-10-PCS | Mod: PBBFAC,HCNC,, | Performed by: FAMILY MEDICINE

## 2021-09-22 PROCEDURE — 3074F PR MOST RECENT SYSTOLIC BLOOD PRESSURE < 130 MM HG: ICD-10-PCS | Mod: HCNC,CPTII,S$GLB, | Performed by: FAMILY MEDICINE

## 2021-09-22 PROCEDURE — 1159F MED LIST DOCD IN RCRD: CPT | Mod: HCNC,CPTII,S$GLB, | Performed by: FAMILY MEDICINE

## 2021-09-22 PROCEDURE — 99214 OFFICE O/P EST MOD 30 MIN: CPT | Mod: HCNC,S$GLB,, | Performed by: FAMILY MEDICINE

## 2021-09-22 PROCEDURE — 1101F PR PT FALLS ASSESS DOC 0-1 FALLS W/OUT INJ PAST YR: ICD-10-PCS | Mod: HCNC,CPTII,S$GLB, | Performed by: FAMILY MEDICINE

## 2021-09-22 PROCEDURE — 4010F ACE/ARB THERAPY RXD/TAKEN: CPT | Mod: HCNC,CPTII,S$GLB, | Performed by: FAMILY MEDICINE

## 2021-09-22 PROCEDURE — 3074F SYST BP LT 130 MM HG: CPT | Mod: HCNC,CPTII,S$GLB, | Performed by: FAMILY MEDICINE

## 2021-09-22 PROCEDURE — 99499 UNLISTED E&M SERVICE: CPT | Mod: S$GLB,,, | Performed by: FAMILY MEDICINE

## 2021-09-22 PROCEDURE — 3288F FALL RISK ASSESSMENT DOCD: CPT | Mod: HCNC,CPTII,S$GLB, | Performed by: FAMILY MEDICINE

## 2021-09-22 PROCEDURE — 3078F PR MOST RECENT DIASTOLIC BLOOD PRESSURE < 80 MM HG: ICD-10-PCS | Mod: HCNC,CPTII,S$GLB, | Performed by: FAMILY MEDICINE

## 2021-09-22 PROCEDURE — 1160F RVW MEDS BY RX/DR IN RCRD: CPT | Mod: HCNC,CPTII,S$GLB, | Performed by: FAMILY MEDICINE

## 2021-09-22 PROCEDURE — 1101F PT FALLS ASSESS-DOCD LE1/YR: CPT | Mod: HCNC,CPTII,S$GLB, | Performed by: FAMILY MEDICINE

## 2021-09-22 PROCEDURE — 99214 PR OFFICE/OUTPT VISIT, EST, LEVL IV, 30-39 MIN: ICD-10-PCS | Mod: HCNC,S$GLB,, | Performed by: FAMILY MEDICINE

## 2021-09-22 PROCEDURE — 99499 RISK ADDL DX/OHS AUDIT: ICD-10-PCS | Mod: S$GLB,,, | Performed by: FAMILY MEDICINE

## 2021-09-22 PROCEDURE — 4010F PR ACE/ARB THEARPY RXD/TAKEN: ICD-10-PCS | Mod: HCNC,CPTII,S$GLB, | Performed by: FAMILY MEDICINE

## 2021-09-22 PROCEDURE — 3288F PR FALLS RISK ASSESSMENT DOCUMENTED: ICD-10-PCS | Mod: HCNC,CPTII,S$GLB, | Performed by: FAMILY MEDICINE

## 2021-12-23 ENCOUNTER — PATIENT MESSAGE (OUTPATIENT)
Dept: ADMINISTRATIVE | Facility: HOSPITAL | Age: 71
End: 2021-12-23
Payer: MEDICARE

## 2021-12-23 ENCOUNTER — PATIENT OUTREACH (OUTPATIENT)
Dept: ADMINISTRATIVE | Facility: HOSPITAL | Age: 71
End: 2021-12-23
Payer: MEDICARE

## 2022-02-15 ENCOUNTER — LAB VISIT (OUTPATIENT)
Dept: LAB | Facility: HOSPITAL | Age: 72
End: 2022-02-15
Attending: FAMILY MEDICINE
Payer: MEDICARE

## 2022-02-15 DIAGNOSIS — I10 ESSENTIAL HYPERTENSION: ICD-10-CM

## 2022-02-15 DIAGNOSIS — E78.5 HYPERLIPIDEMIA, UNSPECIFIED HYPERLIPIDEMIA TYPE: ICD-10-CM

## 2022-02-15 LAB
ALBUMIN SERPL BCP-MCNC: 4.1 G/DL (ref 3.5–5.2)
ALP SERPL-CCNC: 52 U/L (ref 55–135)
ALT SERPL W/O P-5'-P-CCNC: 22 U/L (ref 10–44)
ANION GAP SERPL CALC-SCNC: 14 MMOL/L (ref 8–16)
AST SERPL-CCNC: 24 U/L (ref 10–40)
BASOPHILS # BLD AUTO: 0.03 K/UL (ref 0–0.2)
BASOPHILS NFR BLD: 0.5 % (ref 0–1.9)
BILIRUB SERPL-MCNC: 0.6 MG/DL (ref 0.1–1)
BUN SERPL-MCNC: 20 MG/DL (ref 8–23)
CALCIUM SERPL-MCNC: 10.4 MG/DL (ref 8.7–10.5)
CHLORIDE SERPL-SCNC: 101 MMOL/L (ref 95–110)
CHOLEST SERPL-MCNC: 174 MG/DL (ref 120–199)
CHOLEST/HDLC SERPL: 2.1 {RATIO} (ref 2–5)
CO2 SERPL-SCNC: 28 MMOL/L (ref 23–29)
CREAT SERPL-MCNC: 0.9 MG/DL (ref 0.5–1.4)
DIFFERENTIAL METHOD: ABNORMAL
EOSINOPHIL # BLD AUTO: 0.3 K/UL (ref 0–0.5)
EOSINOPHIL NFR BLD: 4.6 % (ref 0–8)
ERYTHROCYTE [DISTWIDTH] IN BLOOD BY AUTOMATED COUNT: 12.6 % (ref 11.5–14.5)
EST. GFR  (AFRICAN AMERICAN): >60 ML/MIN/1.73 M^2
EST. GFR  (NON AFRICAN AMERICAN): >60 ML/MIN/1.73 M^2
GLUCOSE SERPL-MCNC: 84 MG/DL (ref 70–110)
HCT VFR BLD AUTO: 50.9 % (ref 37–48.5)
HDLC SERPL-MCNC: 83 MG/DL (ref 40–75)
HDLC SERPL: 47.7 % (ref 20–50)
HGB BLD-MCNC: 16.2 G/DL (ref 12–16)
IMM GRANULOCYTES # BLD AUTO: 0.01 K/UL (ref 0–0.04)
IMM GRANULOCYTES NFR BLD AUTO: 0.2 % (ref 0–0.5)
LDLC SERPL CALC-MCNC: 66.6 MG/DL (ref 63–159)
LYMPHOCYTES # BLD AUTO: 2.2 K/UL (ref 1–4.8)
LYMPHOCYTES NFR BLD: 32.7 % (ref 18–48)
MCH RBC QN AUTO: 30.8 PG (ref 27–31)
MCHC RBC AUTO-ENTMCNC: 31.8 G/DL (ref 32–36)
MCV RBC AUTO: 97 FL (ref 82–98)
MONOCYTES # BLD AUTO: 0.5 K/UL (ref 0.3–1)
MONOCYTES NFR BLD: 7.8 % (ref 4–15)
NEUTROPHILS # BLD AUTO: 3.6 K/UL (ref 1.8–7.7)
NEUTROPHILS NFR BLD: 54.2 % (ref 38–73)
NONHDLC SERPL-MCNC: 91 MG/DL
NRBC BLD-RTO: 0 /100 WBC
PLATELET # BLD AUTO: 232 K/UL (ref 150–450)
PMV BLD AUTO: 11.5 FL (ref 9.2–12.9)
POTASSIUM SERPL-SCNC: 4.2 MMOL/L (ref 3.5–5.1)
PROT SERPL-MCNC: 7.6 G/DL (ref 6–8.4)
RBC # BLD AUTO: 5.26 M/UL (ref 4–5.4)
SODIUM SERPL-SCNC: 143 MMOL/L (ref 136–145)
TRIGL SERPL-MCNC: 122 MG/DL (ref 30–150)
TSH SERPL DL<=0.005 MIU/L-ACNC: 1.93 UIU/ML (ref 0.4–4)
WBC # BLD AUTO: 6.58 K/UL (ref 3.9–12.7)

## 2022-02-15 PROCEDURE — 85025 COMPLETE CBC W/AUTO DIFF WBC: CPT | Mod: HCNC | Performed by: FAMILY MEDICINE

## 2022-02-15 PROCEDURE — 36415 COLL VENOUS BLD VENIPUNCTURE: CPT | Mod: HCNC,PN | Performed by: FAMILY MEDICINE

## 2022-02-15 PROCEDURE — 80053 COMPREHEN METABOLIC PANEL: CPT | Mod: HCNC | Performed by: FAMILY MEDICINE

## 2022-02-15 PROCEDURE — 80061 LIPID PANEL: CPT | Mod: HCNC | Performed by: FAMILY MEDICINE

## 2022-02-15 PROCEDURE — 84443 ASSAY THYROID STIM HORMONE: CPT | Mod: HCNC | Performed by: FAMILY MEDICINE

## 2022-02-22 ENCOUNTER — OFFICE VISIT (OUTPATIENT)
Dept: PRIMARY CARE CLINIC | Facility: CLINIC | Age: 72
End: 2022-02-22
Payer: MEDICARE

## 2022-02-22 VITALS
OXYGEN SATURATION: 96 % | BODY MASS INDEX: 30.57 KG/M2 | WEIGHT: 166.13 LBS | HEIGHT: 62 IN | RESPIRATION RATE: 18 BRPM | SYSTOLIC BLOOD PRESSURE: 124 MMHG | HEART RATE: 66 BPM | DIASTOLIC BLOOD PRESSURE: 78 MMHG

## 2022-02-22 DIAGNOSIS — E78.5 HYPERLIPIDEMIA, UNSPECIFIED HYPERLIPIDEMIA TYPE: ICD-10-CM

## 2022-02-22 DIAGNOSIS — Z12.11 COLON CANCER SCREENING: ICD-10-CM

## 2022-02-22 DIAGNOSIS — R71.8 ELEVATED HEMATOCRIT: ICD-10-CM

## 2022-02-22 DIAGNOSIS — I10 ESSENTIAL HYPERTENSION: Primary | ICD-10-CM

## 2022-02-22 DIAGNOSIS — Z78.0 POST-MENOPAUSAL: ICD-10-CM

## 2022-02-22 DIAGNOSIS — Z12.39 ENCOUNTER FOR SCREENING FOR MALIGNANT NEOPLASM OF BREAST, UNSPECIFIED SCREENING MODALITY: ICD-10-CM

## 2022-02-22 PROCEDURE — 3008F PR BODY MASS INDEX (BMI) DOCUMENTED: ICD-10-PCS | Mod: HCNC,CPTII,S$GLB, | Performed by: FAMILY MEDICINE

## 2022-02-22 PROCEDURE — 99999 PR PBB SHADOW E&M-EST. PATIENT-LVL IV: CPT | Mod: PBBFAC,HCNC,, | Performed by: FAMILY MEDICINE

## 2022-02-22 PROCEDURE — 99214 OFFICE O/P EST MOD 30 MIN: CPT | Mod: HCNC,S$GLB,, | Performed by: FAMILY MEDICINE

## 2022-02-22 PROCEDURE — 3074F PR MOST RECENT SYSTOLIC BLOOD PRESSURE < 130 MM HG: ICD-10-PCS | Mod: HCNC,CPTII,S$GLB, | Performed by: FAMILY MEDICINE

## 2022-02-22 PROCEDURE — 1159F PR MEDICATION LIST DOCUMENTED IN MEDICAL RECORD: ICD-10-PCS | Mod: HCNC,CPTII,S$GLB, | Performed by: FAMILY MEDICINE

## 2022-02-22 PROCEDURE — 99214 PR OFFICE/OUTPT VISIT, EST, LEVL IV, 30-39 MIN: ICD-10-PCS | Mod: HCNC,S$GLB,, | Performed by: FAMILY MEDICINE

## 2022-02-22 PROCEDURE — 3288F PR FALLS RISK ASSESSMENT DOCUMENTED: ICD-10-PCS | Mod: HCNC,CPTII,S$GLB, | Performed by: FAMILY MEDICINE

## 2022-02-22 PROCEDURE — 1101F PT FALLS ASSESS-DOCD LE1/YR: CPT | Mod: HCNC,CPTII,S$GLB, | Performed by: FAMILY MEDICINE

## 2022-02-22 PROCEDURE — 1126F AMNT PAIN NOTED NONE PRSNT: CPT | Mod: HCNC,CPTII,S$GLB, | Performed by: FAMILY MEDICINE

## 2022-02-22 PROCEDURE — 1160F PR REVIEW ALL MEDS BY PRESCRIBER/CLIN PHARMACIST DOCUMENTED: ICD-10-PCS | Mod: HCNC,CPTII,S$GLB, | Performed by: FAMILY MEDICINE

## 2022-02-22 PROCEDURE — 1160F RVW MEDS BY RX/DR IN RCRD: CPT | Mod: HCNC,CPTII,S$GLB, | Performed by: FAMILY MEDICINE

## 2022-02-22 PROCEDURE — 3078F DIAST BP <80 MM HG: CPT | Mod: HCNC,CPTII,S$GLB, | Performed by: FAMILY MEDICINE

## 2022-02-22 PROCEDURE — 1101F PR PT FALLS ASSESS DOC 0-1 FALLS W/OUT INJ PAST YR: ICD-10-PCS | Mod: HCNC,CPTII,S$GLB, | Performed by: FAMILY MEDICINE

## 2022-02-22 PROCEDURE — 3074F SYST BP LT 130 MM HG: CPT | Mod: HCNC,CPTII,S$GLB, | Performed by: FAMILY MEDICINE

## 2022-02-22 PROCEDURE — 99999 PR PBB SHADOW E&M-EST. PATIENT-LVL IV: ICD-10-PCS | Mod: PBBFAC,HCNC,, | Performed by: FAMILY MEDICINE

## 2022-02-22 PROCEDURE — 1159F MED LIST DOCD IN RCRD: CPT | Mod: HCNC,CPTII,S$GLB, | Performed by: FAMILY MEDICINE

## 2022-02-22 PROCEDURE — 3008F BODY MASS INDEX DOCD: CPT | Mod: HCNC,CPTII,S$GLB, | Performed by: FAMILY MEDICINE

## 2022-02-22 PROCEDURE — 3078F PR MOST RECENT DIASTOLIC BLOOD PRESSURE < 80 MM HG: ICD-10-PCS | Mod: HCNC,CPTII,S$GLB, | Performed by: FAMILY MEDICINE

## 2022-02-22 PROCEDURE — 3288F FALL RISK ASSESSMENT DOCD: CPT | Mod: HCNC,CPTII,S$GLB, | Performed by: FAMILY MEDICINE

## 2022-02-22 PROCEDURE — 1126F PR PAIN SEVERITY QUANTIFIED, NO PAIN PRESENT: ICD-10-PCS | Mod: HCNC,CPTII,S$GLB, | Performed by: FAMILY MEDICINE

## 2022-02-22 NOTE — ASSESSMENT & PLAN NOTE
Bone density previously scheduled but canceled.  Will reschedule.  Recommend regular exercise along with healthy diet.  Recommend vitamin-D supplementation 7738-4309 units daily.

## 2022-02-22 NOTE — PROGRESS NOTES
THIS DOCUMENT WAS MADE IN PART WITH VOICE RECOGNITION SOFTWARE.  OCCASIONALLY THIS SOFTWARE WILL MISINTERPRET WORDS OR PHRASES.      Primary Care Provider Appointment - Jonatan ROMERO [facility] ([facility])      Patient ID: Katy Yarbrough is a 71 y.o. female.    ASSESSMENT/PLAN by Problem List:  Problem List Items Addressed This Visit     Essential hypertension - Primary (Chronic)     Chronic condition.  This has been stable and well controlled on losartan 100 mg daily, amlodipine 5 mg daily, along with HCTZ 12.5 mg daily.  Continue the same along with healthy lifestyle.           Hyperlipidemia (Chronic)     Chronic condition, stable on atorvastatin 40 mg daily.           Post-menopausal     Bone density previously scheduled but canceled.  Will reschedule.  Recommend regular exercise along with healthy diet.  Recommend vitamin-D supplementation 0577-5924 units daily.           Encounter for screening for malignant neoplasm of breast     Discussed and recommended.  Patient has declined multiple times in the past.  She continues to decline again today even after repeat detailed discussion, recommendations, early detection, etc..           Colon cancer screening     Last fecal occult blood testing was September 2021. Will plan to repeat later this year.           Elevated hematocrit     Mildly elevated H&H, normal RBC count.  Just slightly above baseline but elevated on the last so we will repeat in three months.  If increasing may need to consider Hematology consultation.           Relevant Orders    CBC Auto Differential          Follow Up:  Three months CBC, follow in 5-6 months      Health Maintenance       Date Due Completion Date    Mammogram 05/24/2019 5/24/2018 (Declined)    Override on 5/24/2018: Declined    Override on 1/21/2015: Done (followed by her gynecologist by her report)    DEXA Scan 05/24/2021 5/24/2018 (Done)    Override on 5/24/2018: Done    TETANUS VACCINE 09/01/2022 9/1/2012  (Done)    Override on 9/1/2012: Done (date approximate)    Colorectal Cancer Screening 01/21/2025 9/21/2021    Override on 1/21/2015: Declined (she refused even after extensive counseling)    Lipid Panel 02/15/2027 2/15/2022              Subjective:     Chief Complaint   Patient presents with    Hypertension       HPI    Patient is a/an 71 y.o.  female   RISK of ADMIT/ED: 13    Routine follow-up for several conditions as discussed above.  She has been feeling well and really has no changes or acute concerns.  See above for details on the conditions addressed today      Active Ambulatory Problems     Diagnosis Date Noted    Essential hypertension     Hyperlipidemia     Post-menopausal 02/22/2022    Encounter for screening for malignant neoplasm of breast 02/22/2022    Colon cancer screening 02/22/2022    Elevated hematocrit 02/22/2022     Resolved Ambulatory Problems     Diagnosis Date Noted    C. difficile diarrhea 01/09/2013    URI (upper respiratory infection) 04/25/2015     Past Medical History:   Diagnosis Date    Anemia     Chronic diarrhea     Hypertension     Over weight        History reviewed. No pertinent surgical history.    Past Medical History:   Diagnosis Date    Anemia     Chronic diarrhea     Hyperlipidemia     Hypertension     Over weight        Social History     Socioeconomic History    Marital status: Single   Tobacco Use    Smoking status: Never Smoker    Smokeless tobacco: Never Used   Substance and Sexual Activity    Alcohol use: Yes       Review of Systems   HENT: Negative.    Respiratory: Negative.    Cardiovascular: Negative.    Genitourinary: Negative.    Musculoskeletal: Positive for arthralgias and back pain.       Objective     Physical Exam  Vitals reviewed.   Constitutional:       Appearance: She is well-developed. She is not diaphoretic.   HENT:      Head: Normocephalic and atraumatic.   Eyes:      General: No scleral icterus.     Conjunctiva/sclera:  "Conjunctivae normal.   Neck:      Thyroid: No thyromegaly or thyroid tenderness.      Vascular: No carotid bruit.   Cardiovascular:      Rate and Rhythm: Normal rate and regular rhythm.      Heart sounds: Normal heart sounds. No murmur heard.  Pulmonary:      Effort: Pulmonary effort is normal. No respiratory distress.      Breath sounds: Normal breath sounds.   Skin:     General: Skin is dry.      Findings: No rash.   Neurological:      Mental Status: She is alert and oriented to person, place, and time.   Psychiatric:         Behavior: Behavior normal.       Vitals:    02/22/22 0950   BP: 124/78   BP Location: Right arm   Patient Position: Sitting   BP Method: Medium (Manual)   Pulse: 66   Resp: 18   SpO2: 96%   Weight: 75.4 kg (166 lb 1.9 oz)   Height: 5' 2" (1.575 m)       RECENT LABS:    Lab Results   Component Value Date    WBC 6.58 02/15/2022    HGB 16.2 (H) 02/15/2022    HCT 50.9 (H) 02/15/2022     02/15/2022    CHOL 174 02/15/2022    TRIG 122 02/15/2022    HDL 83 (H) 02/15/2022    ALT 22 02/15/2022    AST 24 02/15/2022     02/15/2022    K 4.2 02/15/2022     02/15/2022    CREATININE 0.9 02/15/2022    BUN 20 02/15/2022    CO2 28 02/15/2022    TSH 1.926 02/15/2022       Results for orders placed or performed in visit on 02/15/22   Lipid Panel   Result Value Ref Range    Cholesterol 174 120 - 199 mg/dL    Triglycerides 122 30 - 150 mg/dL    HDL 83 (H) 40 - 75 mg/dL    LDL Cholesterol 66.6 63.0 - 159.0 mg/dL    HDL/Cholesterol Ratio 47.7 20.0 - 50.0 %    Total Cholesterol/HDL Ratio 2.1 2.0 - 5.0    Non-HDL Cholesterol 91 mg/dL   TSH   Result Value Ref Range    TSH 1.926 0.400 - 4.000 uIU/mL   Comprehensive Metabolic Panel   Result Value Ref Range    Sodium 143 136 - 145 mmol/L    Potassium 4.2 3.5 - 5.1 mmol/L    Chloride 101 95 - 110 mmol/L    CO2 28 23 - 29 mmol/L    Glucose 84 70 - 110 mg/dL    BUN 20 8 - 23 mg/dL    Creatinine 0.9 0.5 - 1.4 mg/dL    Calcium 10.4 8.7 - 10.5 mg/dL    Total " Protein 7.6 6.0 - 8.4 g/dL    Albumin 4.1 3.5 - 5.2 g/dL    Total Bilirubin 0.6 0.1 - 1.0 mg/dL    Alkaline Phosphatase 52 (L) 55 - 135 U/L    AST 24 10 - 40 U/L    ALT 22 10 - 44 U/L    Anion Gap 14 8 - 16 mmol/L    eGFR if African American >60.0 >60 mL/min/1.73 m^2    eGFR if non African American >60.0 >60 mL/min/1.73 m^2   CBC Auto Differential   Result Value Ref Range    WBC 6.58 3.90 - 12.70 K/uL    RBC 5.26 4.00 - 5.40 M/uL    Hemoglobin 16.2 (H) 12.0 - 16.0 g/dL    Hematocrit 50.9 (H) 37.0 - 48.5 %    MCV 97 82 - 98 fL    MCH 30.8 27.0 - 31.0 pg    MCHC 31.8 (L) 32.0 - 36.0 g/dL    RDW 12.6 11.5 - 14.5 %    Platelets 232 150 - 450 K/uL    MPV 11.5 9.2 - 12.9 fL    Immature Granulocytes 0.2 0.0 - 0.5 %    Gran # (ANC) 3.6 1.8 - 7.7 K/uL    Immature Grans (Abs) 0.01 0.00 - 0.04 K/uL    Lymph # 2.2 1.0 - 4.8 K/uL    Mono # 0.5 0.3 - 1.0 K/uL    Eos # 0.3 0.0 - 0.5 K/uL    Baso # 0.03 0.00 - 0.20 K/uL    nRBC 0 0 /100 WBC    Gran % 54.2 38.0 - 73.0 %    Lymph % 32.7 18.0 - 48.0 %    Mono % 7.8 4.0 - 15.0 %    Eosinophil % 4.6 0.0 - 8.0 %    Basophil % 0.5 0.0 - 1.9 %    Differential Method Automated

## 2022-02-22 NOTE — ASSESSMENT & PLAN NOTE
Chronic condition.  This has been stable and well controlled on losartan 100 mg daily, amlodipine 5 mg daily, along with HCTZ 12.5 mg daily.  Continue the same along with healthy lifestyle.

## 2022-02-22 NOTE — ASSESSMENT & PLAN NOTE
Mildly elevated H&H, normal RBC count.  Just slightly above baseline but elevated on the last so we will repeat in three months.  If increasing may need to consider Hematology consultation.

## 2022-03-08 ENCOUNTER — HOSPITAL ENCOUNTER (OUTPATIENT)
Dept: RADIOLOGY | Facility: HOSPITAL | Age: 72
Discharge: HOME | End: 2022-03-08
Attending: FAMILY MEDICINE
Payer: MEDICARE

## 2022-03-08 DIAGNOSIS — Z78.0 POST-MENOPAUSAL: ICD-10-CM

## 2022-03-08 PROCEDURE — 77080 DXA BONE DENSITY AXIAL: CPT | Mod: TC,PO

## 2022-03-08 PROCEDURE — 77080 DEXA BONE DENSITY SPINE HIP: ICD-10-PCS | Mod: 26,,, | Performed by: RADIOLOGY

## 2022-03-08 PROCEDURE — 77080 DXA BONE DENSITY AXIAL: CPT | Mod: 26,,, | Performed by: RADIOLOGY

## 2022-03-10 ENCOUNTER — PATIENT OUTREACH (OUTPATIENT)
Dept: ADMINISTRATIVE | Facility: HOSPITAL | Age: 72
End: 2022-03-10
Payer: MEDICARE

## 2022-03-10 ENCOUNTER — PATIENT MESSAGE (OUTPATIENT)
Dept: ADMINISTRATIVE | Facility: HOSPITAL | Age: 72
End: 2022-03-10
Payer: MEDICARE

## 2022-03-10 NOTE — PROGRESS NOTES
Non-compliant report chart audits for BREAST CANCER SCREENING. Chart review completed for  test overdue (mammograms, Colonoscopies, pap smears, DM labs, and/or EYE EXAMs)      Care Everywhere and media, updates requested and reviewed.      DIS reviewed for test needed.  Mammogram      Outreach to patient in reference to breast cancer screening.  RE:  Patient needs to schedule mammogram.    Portal message sent

## 2022-05-16 ENCOUNTER — LAB VISIT (OUTPATIENT)
Dept: LAB | Facility: HOSPITAL | Age: 72
End: 2022-05-16
Attending: FAMILY MEDICINE
Payer: MEDICARE

## 2022-05-16 DIAGNOSIS — R71.8 ELEVATED HEMATOCRIT: ICD-10-CM

## 2022-05-16 LAB
BASOPHILS # BLD AUTO: 0.05 K/UL (ref 0–0.2)
BASOPHILS NFR BLD: 0.7 % (ref 0–1.9)
DIFFERENTIAL METHOD: NORMAL
EOSINOPHIL # BLD AUTO: 0.2 K/UL (ref 0–0.5)
EOSINOPHIL NFR BLD: 3.3 % (ref 0–8)
ERYTHROCYTE [DISTWIDTH] IN BLOOD BY AUTOMATED COUNT: 12.5 % (ref 11.5–14.5)
HCT VFR BLD AUTO: 48.3 % (ref 37–48.5)
HGB BLD-MCNC: 15.9 G/DL (ref 12–16)
IMM GRANULOCYTES # BLD AUTO: 0.01 K/UL (ref 0–0.04)
IMM GRANULOCYTES NFR BLD AUTO: 0.1 % (ref 0–0.5)
LYMPHOCYTES # BLD AUTO: 2 K/UL (ref 1–4.8)
LYMPHOCYTES NFR BLD: 27.2 % (ref 18–48)
MCH RBC QN AUTO: 30.8 PG (ref 27–31)
MCHC RBC AUTO-ENTMCNC: 32.9 G/DL (ref 32–36)
MCV RBC AUTO: 94 FL (ref 82–98)
MONOCYTES # BLD AUTO: 0.5 K/UL (ref 0.3–1)
MONOCYTES NFR BLD: 6.7 % (ref 4–15)
NEUTROPHILS # BLD AUTO: 4.5 K/UL (ref 1.8–7.7)
NEUTROPHILS NFR BLD: 62 % (ref 38–73)
NRBC BLD-RTO: 0 /100 WBC
PLATELET # BLD AUTO: 243 K/UL (ref 150–450)
PMV BLD AUTO: 11.3 FL (ref 9.2–12.9)
RBC # BLD AUTO: 5.16 M/UL (ref 4–5.4)
WBC # BLD AUTO: 7.32 K/UL (ref 3.9–12.7)

## 2022-05-16 PROCEDURE — 36415 COLL VENOUS BLD VENIPUNCTURE: CPT | Mod: PN | Performed by: FAMILY MEDICINE

## 2022-05-16 PROCEDURE — 85025 COMPLETE CBC W/AUTO DIFF WBC: CPT | Performed by: FAMILY MEDICINE

## 2022-06-28 DIAGNOSIS — I10 ESSENTIAL HYPERTENSION: ICD-10-CM

## 2022-06-28 DIAGNOSIS — E78.5 HYPERLIPIDEMIA, UNSPECIFIED HYPERLIPIDEMIA TYPE: ICD-10-CM

## 2022-06-28 RX ORDER — TRAZODONE HYDROCHLORIDE 50 MG/1
TABLET ORAL
Qty: 90 TABLET | Refills: 2 | Status: SHIPPED | OUTPATIENT
Start: 2022-06-28 | End: 2023-06-30

## 2022-06-28 RX ORDER — AMLODIPINE BESYLATE 5 MG/1
TABLET ORAL
Qty: 90 TABLET | Refills: 2 | Status: SHIPPED | OUTPATIENT
Start: 2022-06-28 | End: 2023-06-30

## 2022-06-28 RX ORDER — HYDROCHLOROTHIAZIDE 12.5 MG/1
CAPSULE ORAL
Qty: 90 CAPSULE | Refills: 2 | Status: SHIPPED | OUTPATIENT
Start: 2022-06-28 | End: 2023-06-30

## 2022-06-28 RX ORDER — LOSARTAN POTASSIUM 100 MG/1
TABLET ORAL
Qty: 90 TABLET | Refills: 2 | Status: SHIPPED | OUTPATIENT
Start: 2022-06-28 | End: 2023-06-30

## 2022-06-28 RX ORDER — ATORVASTATIN CALCIUM 40 MG/1
TABLET, FILM COATED ORAL
Qty: 90 TABLET | Refills: 2 | Status: SHIPPED | OUTPATIENT
Start: 2022-06-28 | End: 2023-06-30

## 2022-06-28 NOTE — TELEPHONE ENCOUNTER
Refill Decision Note   Katy Yarbrough  is requesting a refill authorization.  Brief Assessment and Rationale for Refill:  Approve     Medication Therapy Plan:       Medication Reconciliation Completed: No   Comments:     No Care Gaps recommended.     Note composed:3:41 PM 06/28/2022

## 2022-06-28 NOTE — TELEPHONE ENCOUNTER
No new care gaps identified.  [facility] Embedded Care Gaps. Reference number: [id number]. 6/28/2022   9:26:54 AM MATILDET

## 2022-07-08 ENCOUNTER — TELEPHONE (OUTPATIENT)
Dept: FAMILY MEDICINE | Facility: CLINIC | Age: 72
End: 2022-07-08
Payer: MEDICARE

## 2022-07-18 ENCOUNTER — TELEPHONE (OUTPATIENT)
Dept: PRIMARY CARE CLINIC | Facility: CLINIC | Age: 72
End: 2022-07-18
Payer: MEDICARE

## 2022-07-18 DIAGNOSIS — H91.20 SUDDEN HEARING LOSS, UNSPECIFIED LATERALITY: Primary | ICD-10-CM

## 2022-07-18 NOTE — TELEPHONE ENCOUNTER
----- Message from Tanner May sent at 7/18/2022  8:04 AM CDT -----  Contact: pt at [phone]  Type: Needs Medical Advice  Who Called:  pt  Best Call Back Number: [phone]  Additional Information: pt is calling the office due to not being able to hear out of her right ear she wants to know if she needs to be seen by her doctor or get a referral to see an ENT. Please call back and advise.

## 2022-07-18 NOTE — TELEPHONE ENCOUNTER
Spoke to patient and states that she has been experiencing hearing loss in her R ear for over a week now. Pt states she went to Urgent Care last week and was told she has fluid behind her ear and was given antibiotics. States this is not helping. Attempted to schedule with ENT, but no available appointments until August. Please assist with scheduling patient for sooner appointment. Thanks.

## 2022-07-19 ENCOUNTER — OFFICE VISIT (OUTPATIENT)
Dept: OTOLARYNGOLOGY | Facility: CLINIC | Age: 72
End: 2022-07-19
Payer: MEDICARE

## 2022-07-19 ENCOUNTER — CLINICAL SUPPORT (OUTPATIENT)
Dept: AUDIOLOGY | Facility: CLINIC | Age: 72
End: 2022-07-19
Payer: MEDICARE

## 2022-07-19 VITALS — HEIGHT: 62 IN | WEIGHT: 166.25 LBS | BODY MASS INDEX: 30.59 KG/M2

## 2022-07-19 DIAGNOSIS — H93.291 IMPAIRED AUDITORY DISCRIMINATION, RIGHT: Primary | ICD-10-CM

## 2022-07-19 DIAGNOSIS — H90.3 ASYMMETRICAL SENSORINEURAL HEARING LOSS: ICD-10-CM

## 2022-07-19 DIAGNOSIS — H91.21 SUDDEN HEARING LOSS, RIGHT: ICD-10-CM

## 2022-07-19 DIAGNOSIS — H91.21 SUDDEN RIGHT HEARING LOSS: ICD-10-CM

## 2022-07-19 DIAGNOSIS — H90.A21 SENSORINEURAL HEARING LOSS (SNHL) OF RIGHT EAR WITH RESTRICTED HEARING OF LEFT EAR: Primary | ICD-10-CM

## 2022-07-19 PROCEDURE — 4010F ACE/ARB THERAPY RXD/TAKEN: CPT | Mod: CPTII,S$GLB,, | Performed by: OTOLARYNGOLOGY

## 2022-07-19 PROCEDURE — 99999 PR PBB SHADOW E&M-EST. PATIENT-LVL I: ICD-10-PCS | Mod: PBBFAC,,,

## 2022-07-19 PROCEDURE — 4010F PR ACE/ARB THEARPY RXD/TAKEN: ICD-10-PCS | Mod: CPTII,S$GLB,, | Performed by: OTOLARYNGOLOGY

## 2022-07-19 PROCEDURE — 92557 PR COMPREHENSIVE HEARING TEST: ICD-10-PCS | Mod: S$GLB,,, | Performed by: AUDIOLOGIST-HEARING AID FITTER

## 2022-07-19 PROCEDURE — 1101F PR PT FALLS ASSESS DOC 0-1 FALLS W/OUT INJ PAST YR: ICD-10-PCS | Mod: CPTII,S$GLB,, | Performed by: OTOLARYNGOLOGY

## 2022-07-19 PROCEDURE — 99999 PR PBB SHADOW E&M-EST. PATIENT-LVL III: CPT | Mod: PBBFAC,,, | Performed by: OTOLARYNGOLOGY

## 2022-07-19 PROCEDURE — 1101F PT FALLS ASSESS-DOCD LE1/YR: CPT | Mod: CPTII,S$GLB,, | Performed by: OTOLARYNGOLOGY

## 2022-07-19 PROCEDURE — 99999 PR PBB SHADOW E&M-EST. PATIENT-LVL I: CPT | Mod: PBBFAC,,,

## 2022-07-19 PROCEDURE — 1159F MED LIST DOCD IN RCRD: CPT | Mod: CPTII,S$GLB,, | Performed by: OTOLARYNGOLOGY

## 2022-07-19 PROCEDURE — 92557 COMPREHENSIVE HEARING TEST: CPT | Mod: S$GLB,,, | Performed by: AUDIOLOGIST-HEARING AID FITTER

## 2022-07-19 PROCEDURE — 1126F AMNT PAIN NOTED NONE PRSNT: CPT | Mod: CPTII,S$GLB,, | Performed by: OTOLARYNGOLOGY

## 2022-07-19 PROCEDURE — 92567 TYMPANOMETRY: CPT | Mod: S$GLB,,, | Performed by: AUDIOLOGIST-HEARING AID FITTER

## 2022-07-19 PROCEDURE — 99204 OFFICE O/P NEW MOD 45 MIN: CPT | Mod: S$GLB,,, | Performed by: OTOLARYNGOLOGY

## 2022-07-19 PROCEDURE — 3288F PR FALLS RISK ASSESSMENT DOCUMENTED: ICD-10-PCS | Mod: CPTII,S$GLB,, | Performed by: OTOLARYNGOLOGY

## 2022-07-19 PROCEDURE — 1160F RVW MEDS BY RX/DR IN RCRD: CPT | Mod: CPTII,S$GLB,, | Performed by: OTOLARYNGOLOGY

## 2022-07-19 PROCEDURE — 1160F PR REVIEW ALL MEDS BY PRESCRIBER/CLIN PHARMACIST DOCUMENTED: ICD-10-PCS | Mod: CPTII,S$GLB,, | Performed by: OTOLARYNGOLOGY

## 2022-07-19 PROCEDURE — 99204 PR OFFICE/OUTPT VISIT, NEW, LEVL IV, 45-59 MIN: ICD-10-PCS | Mod: S$GLB,,, | Performed by: OTOLARYNGOLOGY

## 2022-07-19 PROCEDURE — 92567 PR TYMPA2METRY: ICD-10-PCS | Mod: S$GLB,,, | Performed by: AUDIOLOGIST-HEARING AID FITTER

## 2022-07-19 PROCEDURE — 3008F BODY MASS INDEX DOCD: CPT | Mod: CPTII,S$GLB,, | Performed by: OTOLARYNGOLOGY

## 2022-07-19 PROCEDURE — 1126F PR PAIN SEVERITY QUANTIFIED, NO PAIN PRESENT: ICD-10-PCS | Mod: CPTII,S$GLB,, | Performed by: OTOLARYNGOLOGY

## 2022-07-19 PROCEDURE — 99999 PR PBB SHADOW E&M-EST. PATIENT-LVL III: ICD-10-PCS | Mod: PBBFAC,,, | Performed by: OTOLARYNGOLOGY

## 2022-07-19 PROCEDURE — 1159F PR MEDICATION LIST DOCUMENTED IN MEDICAL RECORD: ICD-10-PCS | Mod: CPTII,S$GLB,, | Performed by: OTOLARYNGOLOGY

## 2022-07-19 PROCEDURE — 3008F PR BODY MASS INDEX (BMI) DOCUMENTED: ICD-10-PCS | Mod: CPTII,S$GLB,, | Performed by: OTOLARYNGOLOGY

## 2022-07-19 PROCEDURE — 3288F FALL RISK ASSESSMENT DOCD: CPT | Mod: CPTII,S$GLB,, | Performed by: OTOLARYNGOLOGY

## 2022-07-19 RX ORDER — FLUTICASONE PROPIONATE 50 MCG
1 SPRAY, SUSPENSION (ML) NASAL DAILY
COMMUNITY

## 2022-07-19 RX ORDER — LEVOCETIRIZINE DIHYDROCHLORIDE 5 MG/1
5 TABLET, FILM COATED ORAL NIGHTLY
COMMUNITY
End: 2023-12-29

## 2022-07-19 RX ORDER — PREDNISONE 20 MG/1
TABLET ORAL
Qty: 30 TABLET | Refills: 0 | Status: SHIPPED | OUTPATIENT
Start: 2022-07-19 | End: 2022-08-08 | Stop reason: ALTCHOICE

## 2022-07-19 NOTE — PROGRESS NOTES
Katy Yarbrough was seen 07/19/2022 for an audiological evaluation. Pertinent complaints today include sudden hearing loss AD and tinnitus AU. Pt reports she thought something was wrong with her cell phone when she could not hear from it with her right ear. She took it to the carrier store but thy said it was fine and she realized she could hear from her left ear on her phone.She denies family H/O HL and H/O loud noise exposure. Otoscopy revealed no cerumen in both ears. The tympanic membrane was visualized AU prior to proceeding with the hearing testing.     Results reveal a moderate-to-profound sensorineural hearing loss for the right ear, and  Normal through 2K Hz sloping to moderately severe HF sensorineural hearing loss for the left ear.    Speech Reception Thresholds were  65 dBHL for the right ear and 20 dBHL for the left ear.    Word recognition scores were very poor for the right ear and good for the left ear.   Tympanograms were Type A for the right ear and Type A for the left ear.    Audiogram results were reviewed in detail with patient and all questions were answered. Results will be reviewed by the referring provider at the completion of this note. Recommend further medical evaluation with an ENT provider for the asymmetry that is sudden in nature, binaural amplification pending medical clearance, repeat hearing testing in one year due to asymmetry and tinnitus and bilateral hearing protection with either muffs or in-ear protection in loud noises.

## 2022-07-19 NOTE — PROGRESS NOTES
Subjective:       Patient ID: Katy Yarbrough is a 72 y.o. female.    Chief Complaint: Hearing Loss    Katy is here for sudden hearing loss..   Length of symptoms: < 2 weeks. Had maybe slight URI symptoms but mild overall. Noted suddenly when she couldn't hear her phone. No previous ear issues.  No vertigo. Seen at  and given abx and oral AH.    Htn controlled.    Patient validated questionnaires (if applicable):      %       No flowsheet data found.  No flowsheet data found.  No flowsheet data found.         Social History     Tobacco Use   Smoking Status Never Smoker   Smokeless Tobacco Never Used     Social History     Substance and Sexual Activity   Alcohol Use Yes          Objective:        Constitutional:   She is oriented to person, place, and time. She appears well-developed and well-nourished. She appears alert. She is active. Normal speech.      Head:  Normocephalic and atraumatic. Head is without TMJ tenderness. No scars. Salivary glands normal.  Facial strength is normal.      Ears:    Right Ear: No drainage or swelling. No middle ear effusion. Decreased hearing is noted.   Left Ear: No drainage or swelling.  No middle ear effusion.     Nose:  No mucosal edema, rhinorrhea or sinus tenderness. No turbinate hypertrophy.      Mouth/Throat  Oropharynx clear and moist without lesions or asymmetry, normal uvula midline and mirror exam normal. Normal dentition. No uvula swelling, lacerations or trismus. No oropharyngeal exudate. Tonsillar erythema, tonsillar exudate.      Neck:  Full range of motion with neck supple and no adenopathy. Thyroid tenderness is present. No tracheal deviation, no edema, no erythema, normal range of motion, no stridor, no crepitus and no neck rigidity present. No thyroid mass present.     Cardiovascular:   Intact distal pulses and normal pulses.      Pulmonary/Chest:   Effort normal and breath sounds normal. No stridor.     Psychiatric:   Her speech is normal and behavior is  normal. Her mood appears not anxious. Her affect is not labile.     Neurological:   She is alert and oriented to person, place, and time. No sensory deficit.     Skin:   No abrasions, lacerations, lesions, or rashes. No abrasion and no bruising noted.         Tests / Results:          Assessment:       1. Sensorineural hearing loss (SNHL) of right ear with restricted hearing of left ear    2. Sudden right hearing loss          Plan:         Discussed SSNHL  Pred taper  MRI IAC  RTC 2 weeks with audio, IT steroid if persistent

## 2022-07-26 DIAGNOSIS — H91.90 HEARING DIFFICULTY, UNSPECIFIED LATERALITY: Primary | ICD-10-CM

## 2022-08-08 ENCOUNTER — OFFICE VISIT (OUTPATIENT)
Dept: OTOLARYNGOLOGY | Facility: CLINIC | Age: 72
End: 2022-08-08
Payer: MEDICARE

## 2022-08-08 ENCOUNTER — CLINICAL SUPPORT (OUTPATIENT)
Dept: AUDIOLOGY | Facility: CLINIC | Age: 72
End: 2022-08-08
Payer: MEDICARE

## 2022-08-08 VITALS — HEIGHT: 62 IN | WEIGHT: 166.25 LBS | BODY MASS INDEX: 30.59 KG/M2

## 2022-08-08 DIAGNOSIS — H90.3 BILATERAL SENSORINEURAL HEARING LOSS: ICD-10-CM

## 2022-08-08 DIAGNOSIS — H90.3 ASYMMETRICAL SENSORINEURAL HEARING LOSS: ICD-10-CM

## 2022-08-08 DIAGNOSIS — H93.13 TINNITUS, BILATERAL: Primary | ICD-10-CM

## 2022-08-08 DIAGNOSIS — H91.21 SUDDEN RIGHT HEARING LOSS: Primary | ICD-10-CM

## 2022-08-08 PROCEDURE — 3288F FALL RISK ASSESSMENT DOCD: CPT | Mod: CPTII,S$GLB,, | Performed by: OTOLARYNGOLOGY

## 2022-08-08 PROCEDURE — 99999 PR PBB SHADOW E&M-EST. PATIENT-LVL I: ICD-10-PCS | Mod: PBBFAC,,,

## 2022-08-08 PROCEDURE — 1126F AMNT PAIN NOTED NONE PRSNT: CPT | Mod: CPTII,S$GLB,, | Performed by: OTOLARYNGOLOGY

## 2022-08-08 PROCEDURE — 99999 PR PBB SHADOW E&M-EST. PATIENT-LVL I: CPT | Mod: PBBFAC,,,

## 2022-08-08 PROCEDURE — 1126F PR PAIN SEVERITY QUANTIFIED, NO PAIN PRESENT: ICD-10-PCS | Mod: CPTII,S$GLB,, | Performed by: OTOLARYNGOLOGY

## 2022-08-08 PROCEDURE — 3008F PR BODY MASS INDEX (BMI) DOCUMENTED: ICD-10-PCS | Mod: CPTII,S$GLB,, | Performed by: OTOLARYNGOLOGY

## 2022-08-08 PROCEDURE — 1159F PR MEDICATION LIST DOCUMENTED IN MEDICAL RECORD: ICD-10-PCS | Mod: CPTII,S$GLB,, | Performed by: OTOLARYNGOLOGY

## 2022-08-08 PROCEDURE — 3288F PR FALLS RISK ASSESSMENT DOCUMENTED: ICD-10-PCS | Mod: CPTII,S$GLB,, | Performed by: OTOLARYNGOLOGY

## 2022-08-08 PROCEDURE — 1159F MED LIST DOCD IN RCRD: CPT | Mod: CPTII,S$GLB,, | Performed by: OTOLARYNGOLOGY

## 2022-08-08 PROCEDURE — 92557 PR COMPREHENSIVE HEARING TEST: ICD-10-PCS | Mod: S$GLB,,, | Performed by: AUDIOLOGIST-HEARING AID FITTER

## 2022-08-08 PROCEDURE — 99999 PR PBB SHADOW E&M-EST. PATIENT-LVL III: CPT | Mod: PBBFAC,,, | Performed by: OTOLARYNGOLOGY

## 2022-08-08 PROCEDURE — 1101F PR PT FALLS ASSESS DOC 0-1 FALLS W/OUT INJ PAST YR: ICD-10-PCS | Mod: CPTII,S$GLB,, | Performed by: OTOLARYNGOLOGY

## 2022-08-08 PROCEDURE — 99213 OFFICE O/P EST LOW 20 MIN: CPT | Mod: S$GLB,,, | Performed by: OTOLARYNGOLOGY

## 2022-08-08 PROCEDURE — 4010F ACE/ARB THERAPY RXD/TAKEN: CPT | Mod: CPTII,S$GLB,, | Performed by: OTOLARYNGOLOGY

## 2022-08-08 PROCEDURE — 4010F PR ACE/ARB THEARPY RXD/TAKEN: ICD-10-PCS | Mod: CPTII,S$GLB,, | Performed by: OTOLARYNGOLOGY

## 2022-08-08 PROCEDURE — 3008F BODY MASS INDEX DOCD: CPT | Mod: CPTII,S$GLB,, | Performed by: OTOLARYNGOLOGY

## 2022-08-08 PROCEDURE — 92557 COMPREHENSIVE HEARING TEST: CPT | Mod: S$GLB,,, | Performed by: AUDIOLOGIST-HEARING AID FITTER

## 2022-08-08 PROCEDURE — 99213 PR OFFICE/OUTPT VISIT, EST, LEVL III, 20-29 MIN: ICD-10-PCS | Mod: S$GLB,,, | Performed by: OTOLARYNGOLOGY

## 2022-08-08 PROCEDURE — 99999 PR PBB SHADOW E&M-EST. PATIENT-LVL III: ICD-10-PCS | Mod: PBBFAC,,, | Performed by: OTOLARYNGOLOGY

## 2022-08-08 PROCEDURE — 1101F PT FALLS ASSESS-DOCD LE1/YR: CPT | Mod: CPTII,S$GLB,, | Performed by: OTOLARYNGOLOGY

## 2022-08-08 NOTE — PROGRESS NOTES
Subjective:       Patient ID: Katy Yarbrough is a 72 y.o. female.    Chief Complaint: No chief complaint on file.      Katy is here for follow-up of Sudden R HL.   MR ordered and not completed yet.  Feels near complete recovery. No adverse effects with steroids      Patient validated questionnaires (if applicable):      %       No flowsheet data found.  No flowsheet data found.  No flowsheet data found.         Review of Systems   Constitutional: Negative for activity change and appetite change.   Respiratory: Negative for difficulty breathing and wheezing   Cardiovascular: Negative for chest pain.      Objective:        Constitutional:   Vital signs are normal. She appears well-developed and well-nourished.     Head:  Normocephalic and atraumatic.     Ears:  Hearing normal to normal and whispered voice; external ear normal without scars, lesions, or masses; ear canal, tympanic membrane, and middle ear normal..     Nose:  Nose normal including turbinates, nasal mucosa, sinuses and nasal septum.     Mouth/Throat  Oropharynx clear and moist without lesions or asymmetry.     Neck:  Neck normal without thyromegaly masses, asymmetry, normal tracheal structure, crepitus, and tenderness.         Tests / Results:              Assessment:       No diagnosis found.      Plan:         Improved  Defer any additional treatment  Discussed MRI, she will consider.   RTC 1 yr with audiogram

## 2022-08-08 NOTE — PROGRESS NOTES
Katy Yarbrough was seen 08/08/2022 for an audiological evaluation. Pertinent complaints today include hearing loss and occasional tinnitus AU. Pt reports she thought something was wrong with her cell phone when she could not hear from it with her right ear. She took it to the carrier store but thy said it was fine and she realized she could hear from her left ear on her phone.She denies family H/O HL and H/O loud noise exposure.Hearing test results on 7/19/22 revealed an asymmetrical HL, AD>AS. She says she feels her hearing has improved but not quite back to normal and not equal to her left ear. Otoscopy revealed no cerumen in both ears. The tympanic membrane was visualized AU prior to proceeding with the hearing testing.     Results reveal a bilateral normal sloping to moderately severe sensorineural hearing loss with continued asymmetry at 500 and 2K Hz, AD>AS.    Speech Reception Thresholds were  20 dBHL for the right ear and 20 dBHL for the left ear.    Word recognition scores were excellent for the right ear and excellent for the left ear.  Significant improvement was noted AD when compared to previous hearing testing from 7/19/22.     Audiogram results were reviewed in detail with patient and all questions were answered. Results will be reviewed by the referring provider at the completion of this note. Recommend further medical evaluation with an ENT provider for the continued asymmetry, binaural amplification pending medical clearance, repeat hearing testing in one year due to asymmetry and bilateral hearing protection with either muffs or in-ear protection in loud noises. She is not ready for hearing aids at this time.

## 2022-08-22 ENCOUNTER — OFFICE VISIT (OUTPATIENT)
Dept: PRIMARY CARE CLINIC | Facility: CLINIC | Age: 72
End: 2022-08-22
Payer: MEDICARE

## 2022-08-22 VITALS
SYSTOLIC BLOOD PRESSURE: 132 MMHG | RESPIRATION RATE: 18 BRPM | BODY MASS INDEX: 30.73 KG/M2 | WEIGHT: 167 LBS | OXYGEN SATURATION: 96 % | DIASTOLIC BLOOD PRESSURE: 84 MMHG | HEART RATE: 68 BPM | HEIGHT: 62 IN

## 2022-08-22 DIAGNOSIS — I10 ESSENTIAL HYPERTENSION: Primary | Chronic | ICD-10-CM

## 2022-08-22 DIAGNOSIS — Z12.11 COLON CANCER SCREENING: ICD-10-CM

## 2022-08-22 DIAGNOSIS — E78.5 HYPERLIPIDEMIA, UNSPECIFIED HYPERLIPIDEMIA TYPE: Chronic | ICD-10-CM

## 2022-08-22 DIAGNOSIS — Z12.39 ENCOUNTER FOR SCREENING FOR MALIGNANT NEOPLASM OF BREAST, UNSPECIFIED SCREENING MODALITY: ICD-10-CM

## 2022-08-22 PROCEDURE — 1126F AMNT PAIN NOTED NONE PRSNT: CPT | Mod: CPTII,S$GLB,, | Performed by: FAMILY MEDICINE

## 2022-08-22 PROCEDURE — 4010F PR ACE/ARB THEARPY RXD/TAKEN: ICD-10-PCS | Mod: CPTII,S$GLB,, | Performed by: FAMILY MEDICINE

## 2022-08-22 PROCEDURE — 99999 PR PBB SHADOW E&M-EST. PATIENT-LVL IV: CPT | Mod: PBBFAC,,, | Performed by: FAMILY MEDICINE

## 2022-08-22 PROCEDURE — 3075F SYST BP GE 130 - 139MM HG: CPT | Mod: CPTII,S$GLB,, | Performed by: FAMILY MEDICINE

## 2022-08-22 PROCEDURE — 3008F BODY MASS INDEX DOCD: CPT | Mod: CPTII,S$GLB,, | Performed by: FAMILY MEDICINE

## 2022-08-22 PROCEDURE — 3288F FALL RISK ASSESSMENT DOCD: CPT | Mod: CPTII,S$GLB,, | Performed by: FAMILY MEDICINE

## 2022-08-22 PROCEDURE — 99214 PR OFFICE/OUTPT VISIT, EST, LEVL IV, 30-39 MIN: ICD-10-PCS | Mod: S$GLB,,, | Performed by: FAMILY MEDICINE

## 2022-08-22 PROCEDURE — 1159F MED LIST DOCD IN RCRD: CPT | Mod: CPTII,S$GLB,, | Performed by: FAMILY MEDICINE

## 2022-08-22 PROCEDURE — 1160F PR REVIEW ALL MEDS BY PRESCRIBER/CLIN PHARMACIST DOCUMENTED: ICD-10-PCS | Mod: CPTII,S$GLB,, | Performed by: FAMILY MEDICINE

## 2022-08-22 PROCEDURE — 1160F RVW MEDS BY RX/DR IN RCRD: CPT | Mod: CPTII,S$GLB,, | Performed by: FAMILY MEDICINE

## 2022-08-22 PROCEDURE — 3079F PR MOST RECENT DIASTOLIC BLOOD PRESSURE 80-89 MM HG: ICD-10-PCS | Mod: CPTII,S$GLB,, | Performed by: FAMILY MEDICINE

## 2022-08-22 PROCEDURE — 1101F PT FALLS ASSESS-DOCD LE1/YR: CPT | Mod: CPTII,S$GLB,, | Performed by: FAMILY MEDICINE

## 2022-08-22 PROCEDURE — 99999 PR PBB SHADOW E&M-EST. PATIENT-LVL IV: ICD-10-PCS | Mod: PBBFAC,,, | Performed by: FAMILY MEDICINE

## 2022-08-22 PROCEDURE — 4010F ACE/ARB THERAPY RXD/TAKEN: CPT | Mod: CPTII,S$GLB,, | Performed by: FAMILY MEDICINE

## 2022-08-22 PROCEDURE — 3079F DIAST BP 80-89 MM HG: CPT | Mod: CPTII,S$GLB,, | Performed by: FAMILY MEDICINE

## 2022-08-22 PROCEDURE — 1159F PR MEDICATION LIST DOCUMENTED IN MEDICAL RECORD: ICD-10-PCS | Mod: CPTII,S$GLB,, | Performed by: FAMILY MEDICINE

## 2022-08-22 PROCEDURE — 1101F PR PT FALLS ASSESS DOC 0-1 FALLS W/OUT INJ PAST YR: ICD-10-PCS | Mod: CPTII,S$GLB,, | Performed by: FAMILY MEDICINE

## 2022-08-22 PROCEDURE — 3288F PR FALLS RISK ASSESSMENT DOCUMENTED: ICD-10-PCS | Mod: CPTII,S$GLB,, | Performed by: FAMILY MEDICINE

## 2022-08-22 PROCEDURE — 3075F PR MOST RECENT SYSTOLIC BLOOD PRESS GE 130-139MM HG: ICD-10-PCS | Mod: CPTII,S$GLB,, | Performed by: FAMILY MEDICINE

## 2022-08-22 PROCEDURE — 3008F PR BODY MASS INDEX (BMI) DOCUMENTED: ICD-10-PCS | Mod: CPTII,S$GLB,, | Performed by: FAMILY MEDICINE

## 2022-08-22 PROCEDURE — 1126F PR PAIN SEVERITY QUANTIFIED, NO PAIN PRESENT: ICD-10-PCS | Mod: CPTII,S$GLB,, | Performed by: FAMILY MEDICINE

## 2022-08-22 PROCEDURE — 99214 OFFICE O/P EST MOD 30 MIN: CPT | Mod: S$GLB,,, | Performed by: FAMILY MEDICINE

## 2022-08-22 NOTE — ASSESSMENT & PLAN NOTE
Discussed again with the patient this year.  She continues to refuse to consider screening mammography.  Again Education, discussion about the benefits, risk, importance of early detection, etc..  She states she understands but simply will not agree to do.

## 2022-08-22 NOTE — PROGRESS NOTES
THIS DOCUMENT WAS MADE IN PART WITH VOICE RECOGNITION SOFTWARE.  OCCASIONALLY THIS SOFTWARE WILL MISINTERPRET WORDS OR PHRASES.      Primary Care Provider Appointment   [facility] ([facility])  [address] [place] [address]   Ph: [phone]  Fax: [phone]      Patient ID: Katy Yarbrough is a 72 y.o. female.    ASSESSMENT/PLAN by Problem List:  Problem List Items Addressed This Visit     Essential hypertension - Primary (Chronic)     Borderline control today but home readings are typically less than 130/80.  I do recommend that she monitor regularly and continue a healthy lifestyle.           Relevant Orders    Comprehensive Metabolic Panel    CBC Auto Differential    Hyperlipidemia (Chronic)     This has been stable, continue atorvastatin.  Plan to check labs before next visit.           Relevant Orders    Lipid Panel    Encounter for screening for malignant neoplasm of breast     Discussed again with the patient this year.  She continues to refuse to consider screening mammography.  Again Education, discussion about the benefits, risk, importance of early detection, etc..  She states she understands but simply will not agree to do.           Colon cancer screening    Relevant Orders    Fecal Immunochemical Test (iFOBT)          Follow Up:  5-6 months      Health Maintenance       Date Due Completion Date    Mammogram 05/24/2019 5/24/2018 (Declined)    Override on 5/24/2018: Declined    Override on 1/21/2015: Done (followed by her gynecologist by her report)    COVID-19 Vaccine (4 - Booster for Moderna series) 02/20/2022 10/20/2021    TETANUS VACCINE 09/01/2022 9/1/2012 (Done)    Override on 9/1/2012: Done (date approximate)    Influenza Vaccine (1) 09/01/2022 11/17/2021    Colorectal Cancer Screening 01/21/2025 9/21/2021    Override on 1/21/2015: Declined (she refused even after extensive counseling)    DEXA Scan 03/08/2025 3/8/2022    Override on 5/24/2018: Done  "   Lipid Panel 02/15/2027 2/15/2022            Subjective:     Chief Complaint   Patient presents with    Hypertension     6 month f/u visit      I have reviewed the information entered by the ancillary staff regarding the chief complaint as well as the related history.    HPI    Patient is a/an 72 y.o.  female   RISK of ADMIT/ED: 13    HTN home avg 120/70-80s    Lipids, she remains on atorvastatin.  No significant changes or concerns.  See above for all details addressed today    For complete problem list, past medical history, surgical history, social history, etc., see appropriate section in the electronic medical record    Review of Systems   HENT: Negative.    Respiratory: Negative.    Cardiovascular: Negative.    Gastrointestinal: Negative.    Genitourinary: Negative.    Musculoskeletal: Positive for arthralgias.   Psychiatric/Behavioral: Negative.  Negative for dysphoric mood.       Objective     Physical Exam  Vitals reviewed.   Constitutional:       Appearance: She is well-developed. She is not diaphoretic.   HENT:      Head: Normocephalic and atraumatic.   Eyes:      General: No scleral icterus.     Conjunctiva/sclera: Conjunctivae normal.   Neck:      Thyroid: No thyromegaly or thyroid tenderness.      Vascular: No carotid bruit.   Cardiovascular:      Rate and Rhythm: Normal rate and regular rhythm.      Heart sounds: Normal heart sounds. No murmur heard.  Pulmonary:      Effort: Pulmonary effort is normal. No respiratory distress.      Breath sounds: Normal breath sounds. No wheezing or rales.   Skin:     General: Skin is dry.      Findings: No rash.   Neurological:      Mental Status: She is alert and oriented to person, place, and time.   Psychiatric:         Behavior: Behavior normal.       Vitals:    08/22/22 0951   BP: 132/84   BP Location: Left arm   Patient Position: Sitting   BP Method: Medium (Manual)   Pulse: 68   Resp: 18   SpO2: 96%   Weight: 75.8 kg (167 lb)   Height: 5' 2" (1.575 m) "       RECENT LABS:    Lab Results   Component Value Date    WBC 7.32 05/16/2022    HGB 15.9 05/16/2022    HCT 48.3 05/16/2022     05/16/2022    CHOL 174 02/15/2022    TRIG 122 02/15/2022    HDL 83 (H) 02/15/2022    ALT 22 02/15/2022    AST 24 02/15/2022     02/15/2022    K 4.2 02/15/2022     02/15/2022    CREATININE 0.9 02/15/2022    BUN 20 02/15/2022    CO2 28 02/15/2022    TSH 1.926 02/15/2022       Results for orders placed or performed in visit on 05/16/22   CBC Auto Differential   Result Value Ref Range    WBC 7.32 3.90 - 12.70 K/uL    RBC 5.16 4.00 - 5.40 M/uL    Hemoglobin 15.9 12.0 - 16.0 g/dL    Hematocrit 48.3 37.0 - 48.5 %    MCV 94 82 - 98 fL    MCH 30.8 27.0 - 31.0 pg    MCHC 32.9 32.0 - 36.0 g/dL    RDW 12.5 11.5 - 14.5 %    Platelets 243 150 - 450 K/uL    MPV 11.3 9.2 - 12.9 fL    Immature Granulocytes 0.1 0.0 - 0.5 %    Gran # (ANC) 4.5 1.8 - 7.7 K/uL    Immature Grans (Abs) 0.01 0.00 - 0.04 K/uL    Lymph # 2.0 1.0 - 4.8 K/uL    Mono # 0.5 0.3 - 1.0 K/uL    Eos # 0.2 0.0 - 0.5 K/uL    Baso # 0.05 0.00 - 0.20 K/uL    nRBC 0 0 /100 WBC    Gran % 62.0 38.0 - 73.0 %    Lymph % 27.2 18.0 - 48.0 %    Mono % 6.7 4.0 - 15.0 %    Eosinophil % 3.3 0.0 - 8.0 %    Basophil % 0.7 0.0 - 1.9 %    Differential Method Automated

## 2022-08-22 NOTE — ASSESSMENT & PLAN NOTE
Borderline control today but home readings are typically less than 130/80.  I do recommend that she monitor regularly and continue a healthy lifestyle.

## 2022-10-03 ENCOUNTER — PATIENT MESSAGE (OUTPATIENT)
Dept: ADMINISTRATIVE | Facility: HOSPITAL | Age: 72
End: 2022-10-03
Payer: MEDICARE

## 2022-12-28 ENCOUNTER — LAB VISIT (OUTPATIENT)
Dept: LAB | Facility: HOSPITAL | Age: 72
End: 2022-12-28
Attending: FAMILY MEDICINE
Payer: MEDICARE

## 2022-12-28 DIAGNOSIS — Z12.11 COLON CANCER SCREENING: ICD-10-CM

## 2022-12-28 PROCEDURE — 82274 ASSAY TEST FOR BLOOD FECAL: CPT | Mod: HCNC | Performed by: FAMILY MEDICINE

## 2022-12-29 LAB — HEMOCCULT STL QL IA: NEGATIVE

## 2023-01-09 ENCOUNTER — LAB VISIT (OUTPATIENT)
Dept: LAB | Facility: HOSPITAL | Age: 73
End: 2023-01-09
Attending: FAMILY MEDICINE
Payer: MEDICARE

## 2023-01-09 DIAGNOSIS — I10 ESSENTIAL HYPERTENSION: Chronic | ICD-10-CM

## 2023-01-09 DIAGNOSIS — E78.5 HYPERLIPIDEMIA, UNSPECIFIED HYPERLIPIDEMIA TYPE: Chronic | ICD-10-CM

## 2023-01-09 LAB
ALBUMIN SERPL BCP-MCNC: 3.9 G/DL (ref 3.5–5.2)
ALP SERPL-CCNC: 60 U/L (ref 55–135)
ALT SERPL W/O P-5'-P-CCNC: 13 U/L (ref 10–44)
ANION GAP SERPL CALC-SCNC: 13 MMOL/L (ref 8–16)
AST SERPL-CCNC: 19 U/L (ref 10–40)
BASOPHILS # BLD AUTO: 0.03 K/UL (ref 0–0.2)
BASOPHILS NFR BLD: 0.4 % (ref 0–1.9)
BILIRUB SERPL-MCNC: 0.5 MG/DL (ref 0.1–1)
BUN SERPL-MCNC: 17 MG/DL (ref 8–23)
CALCIUM SERPL-MCNC: 10.1 MG/DL (ref 8.7–10.5)
CHLORIDE SERPL-SCNC: 101 MMOL/L (ref 95–110)
CHOLEST SERPL-MCNC: 160 MG/DL (ref 120–199)
CHOLEST/HDLC SERPL: 2.1 {RATIO} (ref 2–5)
CO2 SERPL-SCNC: 30 MMOL/L (ref 23–29)
CREAT SERPL-MCNC: 0.9 MG/DL (ref 0.5–1.4)
DIFFERENTIAL METHOD: ABNORMAL
EOSINOPHIL # BLD AUTO: 0.1 K/UL (ref 0–0.5)
EOSINOPHIL NFR BLD: 1.8 % (ref 0–8)
ERYTHROCYTE [DISTWIDTH] IN BLOOD BY AUTOMATED COUNT: 12.4 % (ref 11.5–14.5)
EST. GFR  (NO RACE VARIABLE): >60 ML/MIN/1.73 M^2
GLUCOSE SERPL-MCNC: 87 MG/DL (ref 70–110)
HCT VFR BLD AUTO: 48.6 % (ref 37–48.5)
HDLC SERPL-MCNC: 77 MG/DL (ref 40–75)
HDLC SERPL: 48.1 % (ref 20–50)
HGB BLD-MCNC: 15.9 G/DL (ref 12–16)
IMM GRANULOCYTES # BLD AUTO: 0.01 K/UL (ref 0–0.04)
IMM GRANULOCYTES NFR BLD AUTO: 0.1 % (ref 0–0.5)
LDLC SERPL CALC-MCNC: 65.4 MG/DL (ref 63–159)
LYMPHOCYTES # BLD AUTO: 2 K/UL (ref 1–4.8)
LYMPHOCYTES NFR BLD: 28 % (ref 18–48)
MCH RBC QN AUTO: 30.6 PG (ref 27–31)
MCHC RBC AUTO-ENTMCNC: 32.7 G/DL (ref 32–36)
MCV RBC AUTO: 94 FL (ref 82–98)
MONOCYTES # BLD AUTO: 0.6 K/UL (ref 0.3–1)
MONOCYTES NFR BLD: 7.6 % (ref 4–15)
NEUTROPHILS # BLD AUTO: 4.5 K/UL (ref 1.8–7.7)
NEUTROPHILS NFR BLD: 62.1 % (ref 38–73)
NONHDLC SERPL-MCNC: 83 MG/DL
NRBC BLD-RTO: 0 /100 WBC
PLATELET # BLD AUTO: 278 K/UL (ref 150–450)
PMV BLD AUTO: 11.3 FL (ref 9.2–12.9)
POTASSIUM SERPL-SCNC: 4 MMOL/L (ref 3.5–5.1)
PROT SERPL-MCNC: 7.3 G/DL (ref 6–8.4)
RBC # BLD AUTO: 5.2 M/UL (ref 4–5.4)
SODIUM SERPL-SCNC: 144 MMOL/L (ref 136–145)
TRIGL SERPL-MCNC: 88 MG/DL (ref 30–150)
WBC # BLD AUTO: 7.25 K/UL (ref 3.9–12.7)

## 2023-01-09 PROCEDURE — 36415 COLL VENOUS BLD VENIPUNCTURE: CPT | Mod: HCNC,PN | Performed by: FAMILY MEDICINE

## 2023-01-09 PROCEDURE — 85025 COMPLETE CBC W/AUTO DIFF WBC: CPT | Mod: HCNC | Performed by: FAMILY MEDICINE

## 2023-01-09 PROCEDURE — 80061 LIPID PANEL: CPT | Mod: HCNC | Performed by: FAMILY MEDICINE

## 2023-01-09 PROCEDURE — 80053 COMPREHEN METABOLIC PANEL: CPT | Mod: HCNC | Performed by: FAMILY MEDICINE

## 2023-01-23 ENCOUNTER — OFFICE VISIT (OUTPATIENT)
Dept: PRIMARY CARE CLINIC | Facility: CLINIC | Age: 73
End: 2023-01-23
Payer: MEDICARE

## 2023-01-23 VITALS
OXYGEN SATURATION: 98 % | WEIGHT: 154.44 LBS | RESPIRATION RATE: 20 BRPM | BODY MASS INDEX: 28.42 KG/M2 | DIASTOLIC BLOOD PRESSURE: 74 MMHG | HEART RATE: 85 BPM | HEIGHT: 62 IN | SYSTOLIC BLOOD PRESSURE: 128 MMHG

## 2023-01-23 DIAGNOSIS — Z12.39 ENCOUNTER FOR SCREENING FOR MALIGNANT NEOPLASM OF BREAST, UNSPECIFIED SCREENING MODALITY: ICD-10-CM

## 2023-01-23 DIAGNOSIS — E78.5 HYPERLIPIDEMIA, UNSPECIFIED HYPERLIPIDEMIA TYPE: Chronic | ICD-10-CM

## 2023-01-23 DIAGNOSIS — D48.5 NEOPLASM OF UNCERTAIN BEHAVIOR OF SKIN OF FACE: ICD-10-CM

## 2023-01-23 DIAGNOSIS — I10 ESSENTIAL HYPERTENSION: Primary | Chronic | ICD-10-CM

## 2023-01-23 PROCEDURE — 99999 PR PBB SHADOW E&M-EST. PATIENT-LVL IV: CPT | Mod: PBBFAC,HCNC,, | Performed by: FAMILY MEDICINE

## 2023-01-23 PROCEDURE — 3078F DIAST BP <80 MM HG: CPT | Mod: HCNC,CPTII,S$GLB, | Performed by: FAMILY MEDICINE

## 2023-01-23 PROCEDURE — 3008F BODY MASS INDEX DOCD: CPT | Mod: HCNC,CPTII,S$GLB, | Performed by: FAMILY MEDICINE

## 2023-01-23 PROCEDURE — 1160F RVW MEDS BY RX/DR IN RCRD: CPT | Mod: HCNC,CPTII,S$GLB, | Performed by: FAMILY MEDICINE

## 2023-01-23 PROCEDURE — 3288F PR FALLS RISK ASSESSMENT DOCUMENTED: ICD-10-PCS | Mod: HCNC,CPTII,S$GLB, | Performed by: FAMILY MEDICINE

## 2023-01-23 PROCEDURE — 3074F SYST BP LT 130 MM HG: CPT | Mod: HCNC,CPTII,S$GLB, | Performed by: FAMILY MEDICINE

## 2023-01-23 PROCEDURE — 1160F PR REVIEW ALL MEDS BY PRESCRIBER/CLIN PHARMACIST DOCUMENTED: ICD-10-PCS | Mod: HCNC,CPTII,S$GLB, | Performed by: FAMILY MEDICINE

## 2023-01-23 PROCEDURE — 1126F AMNT PAIN NOTED NONE PRSNT: CPT | Mod: HCNC,CPTII,S$GLB, | Performed by: FAMILY MEDICINE

## 2023-01-23 PROCEDURE — 1101F PR PT FALLS ASSESS DOC 0-1 FALLS W/OUT INJ PAST YR: ICD-10-PCS | Mod: HCNC,CPTII,S$GLB, | Performed by: FAMILY MEDICINE

## 2023-01-23 PROCEDURE — 1126F PR PAIN SEVERITY QUANTIFIED, NO PAIN PRESENT: ICD-10-PCS | Mod: HCNC,CPTII,S$GLB, | Performed by: FAMILY MEDICINE

## 2023-01-23 PROCEDURE — 3008F PR BODY MASS INDEX (BMI) DOCUMENTED: ICD-10-PCS | Mod: HCNC,CPTII,S$GLB, | Performed by: FAMILY MEDICINE

## 2023-01-23 PROCEDURE — 1101F PT FALLS ASSESS-DOCD LE1/YR: CPT | Mod: HCNC,CPTII,S$GLB, | Performed by: FAMILY MEDICINE

## 2023-01-23 PROCEDURE — 3288F FALL RISK ASSESSMENT DOCD: CPT | Mod: HCNC,CPTII,S$GLB, | Performed by: FAMILY MEDICINE

## 2023-01-23 PROCEDURE — 3074F PR MOST RECENT SYSTOLIC BLOOD PRESSURE < 130 MM HG: ICD-10-PCS | Mod: HCNC,CPTII,S$GLB, | Performed by: FAMILY MEDICINE

## 2023-01-23 PROCEDURE — 99214 PR OFFICE/OUTPT VISIT, EST, LEVL IV, 30-39 MIN: ICD-10-PCS | Mod: HCNC,S$GLB,, | Performed by: FAMILY MEDICINE

## 2023-01-23 PROCEDURE — 3078F PR MOST RECENT DIASTOLIC BLOOD PRESSURE < 80 MM HG: ICD-10-PCS | Mod: HCNC,CPTII,S$GLB, | Performed by: FAMILY MEDICINE

## 2023-01-23 PROCEDURE — 99999 PR PBB SHADOW E&M-EST. PATIENT-LVL IV: ICD-10-PCS | Mod: PBBFAC,HCNC,, | Performed by: FAMILY MEDICINE

## 2023-01-23 PROCEDURE — 1159F PR MEDICATION LIST DOCUMENTED IN MEDICAL RECORD: ICD-10-PCS | Mod: HCNC,CPTII,S$GLB, | Performed by: FAMILY MEDICINE

## 2023-01-23 PROCEDURE — 1159F MED LIST DOCD IN RCRD: CPT | Mod: HCNC,CPTII,S$GLB, | Performed by: FAMILY MEDICINE

## 2023-01-23 PROCEDURE — 99214 OFFICE O/P EST MOD 30 MIN: CPT | Mod: HCNC,S$GLB,, | Performed by: FAMILY MEDICINE

## 2023-01-23 NOTE — PROGRESS NOTES
THIS DOCUMENT WAS MADE IN PART WITH VOICE RECOGNITION SOFTWARE.  OCCASIONALLY THIS SOFTWARE WILL MISINTERPRET WORDS OR PHRASES.      Primary Care Provider Appointment   [facility] ([facility])  [address] [place] [address]   Ph: [phone]  Fax: [phone]      Patient ID: Katy Yarbrough is a 72 y.o. female.    ASSESSMENT/PLAN by Problem List:  Problem List Items Addressed This Visit       Essential hypertension - Primary (Chronic)     Home readings appear satisfactory.  Remains mildly elevated at clinic.  I did encourage her to continue to follow healthy nutrition and check periodically at home.         Relevant Orders    Comprehensive Metabolic Panel    Hyperlipidemia (Chronic)     Stable, satisfactory, continue atorvastatin 40 milligrams daily         Relevant Orders    Lipid Panel    Encounter for screening for malignant neoplasm of breast     Discussed breast cancer screening in detail.  She continues to decline.  I encouraged her to reconsider, think about this, and let me know if she changes her mind.          Other Visit Diagnoses       Neoplasm of uncertain behavior of skin of face        Relevant Orders    Ambulatory referral/consult to Dermatology            Follow Up:  5-6 months      Health Maintenance         Date Due Completion Date   Reminder given TETANUS VACCINE 09/01/2022 9/1/2012 (Done)    Override on 9/1/2012: Done (date approximate)    Mammogram 08/22/2023 (Originally 5/24/2019) 5/24/2018 (Declined)    Override on 5/24/2018: Declined    Override on 1/21/2015: Done (followed by her gynecologist by her report)    Colorectal Cancer Screening 01/21/2025 12/28/2022    Override on 1/21/2015: Declined (she refused even after extensive counseling)    DEXA Scan 03/08/2025 3/8/2022    Override on 5/24/2018: Done    Lipid Panel 01/09/2028 1/9/2023            Subjective:     Chief Complaint   Patient presents with    Follow-up     Routine f/u     I have  "reviewed the information entered by the ancillary staff regarding the chief complaint as well as the related history.    HPI    Patient is a/an 72 y.o.  female   RISK of ADMIT/ED: 13    Routine follow-up several chronic conditions.  See above for details on these.      Her only complaint today is a reoccurring lesion on the upper nose.  She states this was removed by Dermatology a number of years ago, benign, but has returned.  She request referral back to Dermatology    For complete problem list, past medical history, surgical history, social history, etc., see appropriate section in the electronic medical record    Review of Systems   HENT: Negative.     Respiratory: Negative.     Cardiovascular: Negative.    Gastrointestinal: Negative.    Genitourinary: Negative.    Musculoskeletal: Negative.      Objective     Physical Exam  Vitals reviewed.   Constitutional:       Appearance: She is well-developed. She is not diaphoretic.   HENT:      Head: Normocephalic and atraumatic.     Eyes:      General: No scleral icterus.     Conjunctiva/sclera: Conjunctivae normal.   Neck:      Thyroid: No thyromegaly or thyroid tenderness.      Vascular: No carotid bruit.   Cardiovascular:      Rate and Rhythm: Normal rate and regular rhythm.      Heart sounds: Normal heart sounds. No murmur heard.  Pulmonary:      Effort: Pulmonary effort is normal. No respiratory distress.      Breath sounds: Normal breath sounds. No wheezing or rales.   Skin:     General: Skin is dry.      Findings: No rash.   Neurological:      Mental Status: She is alert and oriented to person, place, and time.   Psychiatric:         Behavior: Behavior normal.     Vitals:    01/23/23 0934   BP: 128/74   BP Location: Left arm   Patient Position: Sitting   BP Method: Medium (Manual)   Pulse: 85   Resp: 20   SpO2: 98%   Weight: 70.1 kg (154 lb 6.9 oz)   Height: 5' 2" (1.575 m)       RECENT LABS:    Lab Results   Component Value Date    WBC 7.25 01/09/2023    HGB " 15.9 01/09/2023    HCT 48.6 (H) 01/09/2023     01/09/2023    CHOL 160 01/09/2023    TRIG 88 01/09/2023    HDL 77 (H) 01/09/2023    ALT 13 01/09/2023    AST 19 01/09/2023     01/09/2023    K 4.0 01/09/2023     01/09/2023    CREATININE 0.9 01/09/2023    BUN 17 01/09/2023    CO2 30 (H) 01/09/2023    TSH 1.926 02/15/2022       Results for orders placed or performed in visit on 01/09/23   Comprehensive Metabolic Panel   Result Value Ref Range    Sodium 144 136 - 145 mmol/L    Potassium 4.0 3.5 - 5.1 mmol/L    Chloride 101 95 - 110 mmol/L    CO2 30 (H) 23 - 29 mmol/L    Glucose 87 70 - 110 mg/dL    BUN 17 8 - 23 mg/dL    Creatinine 0.9 0.5 - 1.4 mg/dL    Calcium 10.1 8.7 - 10.5 mg/dL    Total Protein 7.3 6.0 - 8.4 g/dL    Albumin 3.9 3.5 - 5.2 g/dL    Total Bilirubin 0.5 0.1 - 1.0 mg/dL    Alkaline Phosphatase 60 55 - 135 U/L    AST 19 10 - 40 U/L    ALT 13 10 - 44 U/L    Anion Gap 13 8 - 16 mmol/L    eGFR >60.0 >60 mL/min/1.73 m^2   CBC Auto Differential   Result Value Ref Range    WBC 7.25 3.90 - 12.70 K/uL    RBC 5.20 4.00 - 5.40 M/uL    Hemoglobin 15.9 12.0 - 16.0 g/dL    Hematocrit 48.6 (H) 37.0 - 48.5 %    MCV 94 82 - 98 fL    MCH 30.6 27.0 - 31.0 pg    MCHC 32.7 32.0 - 36.0 g/dL    RDW 12.4 11.5 - 14.5 %    Platelets 278 150 - 450 K/uL    MPV 11.3 9.2 - 12.9 fL    Immature Granulocytes 0.1 0.0 - 0.5 %    Gran # (ANC) 4.5 1.8 - 7.7 K/uL    Immature Grans (Abs) 0.01 0.00 - 0.04 K/uL    Lymph # 2.0 1.0 - 4.8 K/uL    Mono # 0.6 0.3 - 1.0 K/uL    Eos # 0.1 0.0 - 0.5 K/uL    Baso # 0.03 0.00 - 0.20 K/uL    nRBC 0 0 /100 WBC    Gran % 62.1 38.0 - 73.0 %    Lymph % 28.0 18.0 - 48.0 %    Mono % 7.6 4.0 - 15.0 %    Eosinophil % 1.8 0.0 - 8.0 %    Basophil % 0.4 0.0 - 1.9 %    Differential Method Automated    Lipid Panel   Result Value Ref Range    Cholesterol 160 120 - 199 mg/dL    Triglycerides 88 30 - 150 mg/dL    HDL 77 (H) 40 - 75 mg/dL    LDL Cholesterol 65.4 63.0 - 159.0 mg/dL    HDL/Cholesterol  Ratio 48.1 20.0 - 50.0 %    Total Cholesterol/HDL Ratio 2.1 2.0 - 5.0    Non-HDL Cholesterol 83 mg/dL

## 2023-01-23 NOTE — ASSESSMENT & PLAN NOTE
Home readings appear satisfactory.  Remains mildly elevated at clinic.  I did encourage her to continue to follow healthy nutrition and check periodically at home.

## 2023-01-23 NOTE — ASSESSMENT & PLAN NOTE
Discussed breast cancer screening in detail.  She continues to decline.  I encouraged her to reconsider, think about this, and let me know if she changes her mind.

## 2023-02-07 DIAGNOSIS — Z00.00 ENCOUNTER FOR MEDICARE ANNUAL WELLNESS EXAM: ICD-10-CM

## 2023-02-09 DIAGNOSIS — Z00.00 ENCOUNTER FOR MEDICARE ANNUAL WELLNESS EXAM: ICD-10-CM

## 2023-02-27 ENCOUNTER — OFFICE VISIT (OUTPATIENT)
Dept: DERMATOLOGY | Facility: CLINIC | Age: 73
End: 2023-02-27
Payer: MEDICARE

## 2023-02-27 VITALS — BODY MASS INDEX: 28.34 KG/M2 | HEIGHT: 62 IN | WEIGHT: 154 LBS

## 2023-02-27 DIAGNOSIS — L82.1 SEBORRHEIC KERATOSIS: Primary | ICD-10-CM

## 2023-02-27 DIAGNOSIS — L91.8 ACROCHORDON: ICD-10-CM

## 2023-02-27 PROCEDURE — 1101F PR PT FALLS ASSESS DOC 0-1 FALLS W/OUT INJ PAST YR: ICD-10-PCS | Mod: HCNC,CPTII,S$GLB, | Performed by: DERMATOLOGY

## 2023-02-27 PROCEDURE — 3288F FALL RISK ASSESSMENT DOCD: CPT | Mod: HCNC,CPTII,S$GLB, | Performed by: DERMATOLOGY

## 2023-02-27 PROCEDURE — 3288F PR FALLS RISK ASSESSMENT DOCUMENTED: ICD-10-PCS | Mod: HCNC,CPTII,S$GLB, | Performed by: DERMATOLOGY

## 2023-02-27 PROCEDURE — 1159F MED LIST DOCD IN RCRD: CPT | Mod: HCNC,CPTII,S$GLB, | Performed by: DERMATOLOGY

## 2023-02-27 PROCEDURE — 99203 PR OFFICE/OUTPT VISIT, NEW, LEVL III, 30-44 MIN: ICD-10-PCS | Mod: HCNC,S$GLB,, | Performed by: DERMATOLOGY

## 2023-02-27 PROCEDURE — 1159F PR MEDICATION LIST DOCUMENTED IN MEDICAL RECORD: ICD-10-PCS | Mod: HCNC,CPTII,S$GLB, | Performed by: DERMATOLOGY

## 2023-02-27 PROCEDURE — 1126F PR PAIN SEVERITY QUANTIFIED, NO PAIN PRESENT: ICD-10-PCS | Mod: HCNC,CPTII,S$GLB, | Performed by: DERMATOLOGY

## 2023-02-27 PROCEDURE — 3008F PR BODY MASS INDEX (BMI) DOCUMENTED: ICD-10-PCS | Mod: HCNC,CPTII,S$GLB, | Performed by: DERMATOLOGY

## 2023-02-27 PROCEDURE — 1101F PT FALLS ASSESS-DOCD LE1/YR: CPT | Mod: HCNC,CPTII,S$GLB, | Performed by: DERMATOLOGY

## 2023-02-27 PROCEDURE — 99203 OFFICE O/P NEW LOW 30 MIN: CPT | Mod: HCNC,S$GLB,, | Performed by: DERMATOLOGY

## 2023-02-27 PROCEDURE — 99999 PR PBB SHADOW E&M-EST. PATIENT-LVL III: ICD-10-PCS | Mod: PBBFAC,HCNC,, | Performed by: DERMATOLOGY

## 2023-02-27 PROCEDURE — 3008F BODY MASS INDEX DOCD: CPT | Mod: HCNC,CPTII,S$GLB, | Performed by: DERMATOLOGY

## 2023-02-27 PROCEDURE — 99999 PR PBB SHADOW E&M-EST. PATIENT-LVL III: CPT | Mod: PBBFAC,HCNC,, | Performed by: DERMATOLOGY

## 2023-02-27 PROCEDURE — 1126F AMNT PAIN NOTED NONE PRSNT: CPT | Mod: HCNC,CPTII,S$GLB, | Performed by: DERMATOLOGY

## 2023-02-27 NOTE — PROGRESS NOTES
Subjective:       Patient ID:  Katy Yarbrough is a 72 y.o. female who presents for   Chief Complaint   Patient presents with    Spot     On nose     HPINew patient    Here today for a lesion on her nose x 1-2 years without s&S    Has no hx of NMSC  Has no fhx of MM    Current Outpatient Medications:     amLODIPine (NORVASC) 5 MG tablet, TAKE 1 TABLET EVERY DAY, Disp: 90 tablet, Rfl: 2    ascorbic acid, vitamin C, (VITAMIN C) 1000 MG tablet, Take 1,000 mg by mouth once daily., Disp: , Rfl:     atorvastatin (LIPITOR) 40 MG tablet, TAKE 1 TABLET EVERY EVENING, Disp: 90 tablet, Rfl: 2    fluticasone propionate (FLONASE) 50 mcg/actuation nasal spray, 1 spray by Each Nostril route once daily., Disp: , Rfl:     hydroCHLOROthiazide (MICROZIDE) 12.5 mg capsule, TAKE 1 CAPSULE EVERY DAY, Disp: 90 capsule, Rfl: 2    levocetirizine (XYZAL) 5 MG tablet, Take 5 mg by mouth every evening., Disp: , Rfl:     losartan (COZAAR) 100 MG tablet, TAKE 1 TABLET EVERY DAY, Disp: 90 tablet, Rfl: 2    magnesium 200 mg Tab, , Disp: , Rfl:     naproxen sodium 220 mg Cap, daily as needed. , Disp: , Rfl:     traZODone (DESYREL) 50 MG tablet, TAKE 1 TABLET EVERY EVENING, Disp: 90 tablet, Rfl: 2    TURMERIC ORAL, Take 1 Dose by mouth once daily., Disp: , Rfl:       Review of Systems   Constitutional:  Negative for fever and chills.   Skin:  Positive for activity-related sunscreen use and wears hat. Negative for itching, rash, dry skin and daily sunscreen use.   Hematologic/Lymphatic: Does not bruise/bleed easily.      Objective:    Physical Exam       Diagram Legend     Erythematous scaling macule/papule c/w actinic keratosis       Vascular papule c/w angioma      Pigmented verrucoid papule/plaque c/w seborrheic keratosis      Yellow umbilicated papule c/w sebaceous hyperplasia      Irregularly shaped tan macule c/w lentigo     1-2 mm smooth white papules consistent with Milia      Movable subcutaneous cyst with punctum c/w epidermal inclusion  cyst      Subcutaneous movable cyst c/w pilar cyst      Firm pink to brown papule c/w dermatofibroma      Pedunculated fleshy papule(s) c/w skin tag(s)      Evenly pigmented macule c/w junctional nevus     Mildly variegated pigmented, slightly irregular-bordered macule c/w mildly atypical nevus      Flesh colored to evenly pigmented papule c/w intradermal nevus       Pink pearly papule/plaque c/w basal cell carcinoma      Erythematous hyperkeratotic cursted plaque c/w SCC      Surgical scar with no sign of skin cancer recurrence      Open and closed comedones      Inflammatory papules and pustules      Verrucoid papule consistent consistent with wart     Erythematous eczematous patches and plaques     Dystrophic onycholytic nail with subungual debris c/w onychomycosis     Umbilicated papule    Erythematous-base heme-crusted tan verrucoid plaque consistent with inflamed seborrheic keratosis     Erythematous Silvery Scaling Plaque c/w Psoriasis     See annotation      Assessment / Plan:        Neoplasm of uncertain behavior of skin of face  -     Ambulatory referral/consult to Dermatology             No follow-ups on file.

## 2023-02-27 NOTE — PROGRESS NOTES
Subjective:       Patient ID:  Katy Yarbrough is a 72 y.o. female who presents for   Chief Complaint   Patient presents with    Spot     On nose     HPI  Established patient.   No personal or known family hx skin cancer.    C/o nose, neck lesions.     Review of Systems     Objective:    Physical Exam   Skin:               Diagram Legend     Erythematous scaling macule/papule c/w actinic keratosis       Vascular papule c/w angioma      Pigmented verrucoid papule/plaque c/w seborrheic keratosis      Yellow umbilicated papule c/w sebaceous hyperplasia      Irregularly shaped tan macule c/w lentigo     1-2 mm smooth white papules consistent with Milia      Movable subcutaneous cyst with punctum c/w epidermal inclusion cyst      Subcutaneous movable cyst c/w pilar cyst      Firm pink to brown papule c/w dermatofibroma      Pedunculated fleshy papule(s) c/w skin tag(s)      Evenly pigmented macule c/w junctional nevus     Mildly variegated pigmented, slightly irregular-bordered macule c/w mildly atypical nevus      Flesh colored to evenly pigmented papule c/w intradermal nevus       Pink pearly papule/plaque c/w basal cell carcinoma      Erythematous hyperkeratotic cursted plaque c/w SCC      Surgical scar with no sign of skin cancer recurrence      Open and closed comedones      Inflammatory papules and pustules      Verrucoid papule consistent consistent with wart     Erythematous eczematous patches and plaques     Dystrophic onycholytic nail with subungual debris c/w onychomycosis     Umbilicated papule    Erythematous-base heme-crusted tan verrucoid plaque consistent with inflamed seborrheic keratosis     Erythematous Silvery Scaling Plaque c/w Psoriasis     See annotation      Assessment / Plan:        Seborrheic keratosis  -     Ambulatory referral/consult to Dermatology  - Benign; reassured treatment not necessary.    - Ln2 offered to partial lesion (R nasal side wall) as test spot prior to further cosmetic  removal / destruction.     Acrochordon  - Benign; reassured treatment not necessary.    - ln2 offered           Follow up in about 2 months (around 4/27/2023).

## 2023-03-03 NOTE — PATIENT INSTRUCTIONS

## 2023-03-31 ENCOUNTER — TELEPHONE (OUTPATIENT)
Dept: OTOLARYNGOLOGY | Facility: CLINIC | Age: 73
End: 2023-03-31
Payer: MEDICARE

## 2023-04-12 ENCOUNTER — PROCEDURE VISIT (OUTPATIENT)
Dept: DERMATOLOGY | Facility: CLINIC | Age: 73
End: 2023-04-12
Payer: MEDICARE

## 2023-04-12 DIAGNOSIS — L82.1 SEBORRHEIC KERATOSIS: Primary | ICD-10-CM

## 2023-04-12 PROCEDURE — 99212 OFFICE O/P EST SF 10 MIN: CPT | Mod: HCNC,S$GLB,, | Performed by: DERMATOLOGY

## 2023-04-12 PROCEDURE — 99212 PR OFFICE/OUTPT VISIT, EST, LEVL II, 10-19 MIN: ICD-10-PCS | Mod: HCNC,S$GLB,, | Performed by: DERMATOLOGY

## 2023-04-12 NOTE — PROGRESS NOTES
Subjective:       Patient ID:  Katy Yarbrough is a 72 y.o. female who presents for   No chief complaint on file.    HPI  Established patient. F/u SK at nose; partial lesion treated with LN2 with good result.   Review of Systems     Objective:    Physical Exam   Skin:               Diagram Legend     Erythematous scaling macule/papule c/w actinic keratosis       Vascular papule c/w angioma      Pigmented verrucoid papule/plaque c/w seborrheic keratosis      Yellow umbilicated papule c/w sebaceous hyperplasia      Irregularly shaped tan macule c/w lentigo     1-2 mm smooth white papules consistent with Milia      Movable subcutaneous cyst with punctum c/w epidermal inclusion cyst      Subcutaneous movable cyst c/w pilar cyst      Firm pink to brown papule c/w dermatofibroma      Pedunculated fleshy papule(s) c/w skin tag(s)      Evenly pigmented macule c/w junctional nevus     Mildly variegated pigmented, slightly irregular-bordered macule c/w mildly atypical nevus      Flesh colored to evenly pigmented papule c/w intradermal nevus       Pink pearly papule/plaque c/w basal cell carcinoma      Erythematous hyperkeratotic cursted plaque c/w SCC      Surgical scar with no sign of skin cancer recurrence      Open and closed comedones      Inflammatory papules and pustules      Verrucoid papule consistent consistent with wart     Erythematous eczematous patches and plaques     Dystrophic onycholytic nail with subungual debris c/w onychomycosis     Umbilicated papule    Erythematous-base heme-crusted tan verrucoid plaque consistent with inflamed seborrheic keratosis     Erythematous Silvery Scaling Plaque c/w Psoriasis     See annotation      Assessment / Plan:        Seborrheic keratosis  - Benign; reassured treatment not necessary.    - Ln2 offered to remaining lesion.             Follow up if symptoms worsen or fail to improve.

## 2023-04-12 NOTE — PATIENT INSTRUCTIONS

## 2023-06-28 ENCOUNTER — TELEPHONE (OUTPATIENT)
Dept: DERMATOLOGY | Facility: CLINIC | Age: 73
End: 2023-06-28
Payer: MEDICARE

## 2023-06-30 DIAGNOSIS — I10 ESSENTIAL HYPERTENSION: ICD-10-CM

## 2023-06-30 DIAGNOSIS — E78.5 HYPERLIPIDEMIA, UNSPECIFIED HYPERLIPIDEMIA TYPE: ICD-10-CM

## 2023-06-30 RX ORDER — ATORVASTATIN CALCIUM 40 MG/1
TABLET, FILM COATED ORAL
Qty: 90 TABLET | Refills: 1 | Status: SHIPPED | OUTPATIENT
Start: 2023-06-30 | End: 2024-03-24

## 2023-06-30 RX ORDER — HYDROCHLOROTHIAZIDE 12.5 MG/1
CAPSULE ORAL
Qty: 90 CAPSULE | Refills: 1 | Status: SHIPPED | OUTPATIENT
Start: 2023-06-30 | End: 2024-03-25

## 2023-06-30 RX ORDER — LOSARTAN POTASSIUM 100 MG/1
TABLET ORAL
Qty: 90 TABLET | Refills: 1 | Status: SHIPPED | OUTPATIENT
Start: 2023-06-30 | End: 2024-03-25

## 2023-06-30 RX ORDER — AMLODIPINE BESYLATE 5 MG/1
TABLET ORAL
Qty: 90 TABLET | Refills: 1 | Status: SHIPPED | OUTPATIENT
Start: 2023-06-30 | End: 2024-03-25

## 2023-06-30 RX ORDER — TRAZODONE HYDROCHLORIDE 50 MG/1
TABLET ORAL
Qty: 90 TABLET | Refills: 1 | Status: SHIPPED | OUTPATIENT
Start: 2023-06-30 | End: 2024-03-24

## 2023-06-30 NOTE — TELEPHONE ENCOUNTER
No care due was identified.  [facility] Embedded Care Due Messages. Reference number: [id number].   6/30/2023 9:07:04 AM CDT

## 2023-06-30 NOTE — TELEPHONE ENCOUNTER
Refill Decision Note   Katy Yarbrough  is requesting a refill authorization.  Brief Assessment and Rationale for Refill:  Approve     Medication Therapy Plan:         Comments:     Note composed:11:17 AM 06/30/2023

## 2023-07-17 ENCOUNTER — LAB VISIT (OUTPATIENT)
Dept: LAB | Facility: HOSPITAL | Age: 73
End: 2023-07-17
Attending: FAMILY MEDICINE
Payer: MEDICARE

## 2023-07-17 DIAGNOSIS — I10 ESSENTIAL HYPERTENSION: Chronic | ICD-10-CM

## 2023-07-17 DIAGNOSIS — E78.5 HYPERLIPIDEMIA, UNSPECIFIED HYPERLIPIDEMIA TYPE: Chronic | ICD-10-CM

## 2023-07-17 LAB
ALBUMIN SERPL BCP-MCNC: 4.1 G/DL (ref 3.5–5.2)
ALP SERPL-CCNC: 57 U/L (ref 55–135)
ALT SERPL W/O P-5'-P-CCNC: 18 U/L (ref 10–44)
ANION GAP SERPL CALC-SCNC: 10 MMOL/L (ref 8–16)
AST SERPL-CCNC: 20 U/L (ref 10–40)
BILIRUB SERPL-MCNC: 0.6 MG/DL (ref 0.1–1)
BUN SERPL-MCNC: 18 MG/DL (ref 8–23)
CALCIUM SERPL-MCNC: 10.1 MG/DL (ref 8.7–10.5)
CHLORIDE SERPL-SCNC: 103 MMOL/L (ref 95–110)
CHOLEST SERPL-MCNC: 191 MG/DL (ref 120–199)
CHOLEST/HDLC SERPL: 2 {RATIO} (ref 2–5)
CO2 SERPL-SCNC: 31 MMOL/L (ref 23–29)
CREAT SERPL-MCNC: 0.9 MG/DL (ref 0.5–1.4)
EST. GFR  (NO RACE VARIABLE): >60 ML/MIN/1.73 M^2
GLUCOSE SERPL-MCNC: 91 MG/DL (ref 70–110)
HDLC SERPL-MCNC: 94 MG/DL (ref 40–75)
HDLC SERPL: 49.2 % (ref 20–50)
LDLC SERPL CALC-MCNC: 83.2 MG/DL (ref 63–159)
NONHDLC SERPL-MCNC: 97 MG/DL
POTASSIUM SERPL-SCNC: 4.4 MMOL/L (ref 3.5–5.1)
PROT SERPL-MCNC: 7.5 G/DL (ref 6–8.4)
SODIUM SERPL-SCNC: 144 MMOL/L (ref 136–145)
TRIGL SERPL-MCNC: 69 MG/DL (ref 30–150)

## 2023-07-17 PROCEDURE — 36415 COLL VENOUS BLD VENIPUNCTURE: CPT | Mod: HCNC,PN | Performed by: FAMILY MEDICINE

## 2023-07-17 PROCEDURE — 80053 COMPREHEN METABOLIC PANEL: CPT | Mod: HCNC | Performed by: FAMILY MEDICINE

## 2023-07-17 PROCEDURE — 80061 LIPID PANEL: CPT | Mod: HCNC | Performed by: FAMILY MEDICINE

## 2023-07-29 PROBLEM — M85.80 OSTEOPENIA AFTER MENOPAUSE: Status: ACTIVE | Noted: 2023-07-29

## 2023-07-29 PROBLEM — Z78.0 OSTEOPENIA AFTER MENOPAUSE: Status: ACTIVE | Noted: 2023-07-29

## 2023-07-29 NOTE — ASSESSMENT & PLAN NOTE
Mild, last bone density in 2022, did not meet criteria to recommend medication.  Will plan to repeat in 1-2 years.  Recommend regular light exercise, vitamin-D supplementation.

## 2023-07-29 NOTE — ASSESSMENT & PLAN NOTE
Borderline here.  However home readings are very good averaging about 122/60.  I encouraged her to check a few times a week and let me know if anything changes.

## 2023-07-29 NOTE — PROGRESS NOTES
THIS DOCUMENT WAS MADE IN PART WITH VOICE RECOGNITION SOFTWARE.  OCCASIONALLY THIS SOFTWARE WILL MISINTERPRET WORDS OR PHRASES.      Primary Care Provider Appointment   [facility]       Patient ID: Katy Yarbrough is a 73 y.o. female.    ASSESSMENT/PLAN by Problem List:    1. Essential hypertension  Assessment & Plan:  Borderline here.  However home readings are very good averaging about 122/60.  I encouraged her to check a few times a week and let me know if anything changes.    Orders:  -     Comprehensive Metabolic Panel; Future; Expected date: 07/31/2023  -     CBC Auto Differential; Future; Expected date: 07/31/2023    2. Hyperlipidemia, unspecified hyperlipidemia type  Assessment & Plan:  This has been stable and satisfactory.  Continue atorvastatin 40 mg.  Discussed healthy nutrition and regular exercise    Orders:  -     Lipid Panel; Future; Expected date: 07/31/2023    3. Osteopenia after menopause  Assessment & Plan:  Mild, last bone density in 2022, did not meet criteria to recommend medication.  Will plan to repeat in 1-2 years.  Recommend regular light exercise, vitamin-D supplementation.      4. Encounter for screening for malignant neoplasm of breast, unspecified screening modality  Assessment & Plan:  She has repeatedly declined mammogram.  Discussed again today and she continues to decline      5. Colon cancer screening  Assessment & Plan:  Discussed colon cancer screening.  She continues to decline colonoscopy.  She will complete the fit kit.  She completed this last year at the very end of December, so there will not be time to completed in 2023 unless we do it early in December so she will delay complete and returned to us earlier in December.    Orders:  -     Fecal Immunochemical Test (iFOBT); Future; Expected date: 07/31/2023    6. Lesion of nose  Assessment & Plan:  There is a non pigmented raised lesion on her nose.  She consulted with Dermatology earlier  in the year, it was felt to be benign, she underwent cryotherapy and it reduced in size but has now returned to full size.  She will follow back with Dermatology as scheduled.           Follow Up:  Six months    Health Maintenance         Date Due Completion Date    TETANUS VACCINE 09/01/2022 9/1/2012 (Done)    Override on 9/1/2012: Done (date approximate)    COVID-19 Vaccine (7 - Moderna series) 01/27/2023 9/27/2022    Mammogram 08/22/2023 (Originally 5/24/2019) 5/24/2018 (Declined)    Override on 5/24/2018: Declined    Override on 1/21/2015: Done (followed by her gynecologist by her report)    Influenza Vaccine (1) 09/01/2023 10/26/2022    Colorectal Cancer Screening 01/21/2025 12/28/2022    Override on 1/21/2015: Declined (she refused even after extensive counseling)    DEXA Scan 03/08/2025 3/8/2022    Override on 5/24/2018: Done    Lipid Panel 07/17/2028 7/17/2023        ** recommended tetanus shot and COVID.    Advance Care Planning     Date: 07/29/2023    Living Will  Power of   I initiated the process of voluntary advance care planning today and explained the importance of this process to the patient.  I introduced the concept of advance directives to the patient, as well. Then the patient received detailed information about the importance of designating a Health Care Power of  (HCPOA). She was also instructed to communicate with this person about their wishes for future healthcare, should she become sick and lose decision-making capacity. The patient has not previously appointed a HCPOA. After our discussion, the patient has decided to complete a HCPOA and has appointed her :  She is not decided who, she has several sisters but there are older than her, considering her niece.  She will consider and discuss with the appropriate individuals.     Patient was given information and counseled regarding advanced care planning including living will and healthcare power of .  I encouraged the  patient to review the information, discuss with their family, complete when ready or return with additional questions if needed.           Subjective:     Chief Complaint   Patient presents with    Follow-up     I have reviewed the information entered by the ancillary staff regarding the chief complaint as well as the related history.    HPI    Patient is a/an 73 y.o.  female     Overall doing well.  Her only concern is a l nose, saw derm, s/p cryo, but returned    For complete problem list, past medical history, surgical history, social history, etc., see appropriate section in the electronic medical record    Review of Systems   Constitutional:  Negative for activity change and unexpected weight change.   Respiratory: Negative.  Negative for shortness of breath.    Cardiovascular: Negative.  Negative for chest pain.   Gastrointestinal: Negative.    Musculoskeletal: Negative.    Neurological: Negative.        Objective     Physical Exam  Vitals reviewed.   Constitutional:       General: She is not in acute distress.     Appearance: She is well-developed. She is not ill-appearing.   HENT:      Head: Normocephalic and atraumatic.      Comments: Non pigmented raised lesion mid nose.  Mild excoriation.  Eyes:      General: No scleral icterus.     Conjunctiva/sclera: Conjunctivae normal.   Cardiovascular:      Rate and Rhythm: Normal rate and regular rhythm.      Heart sounds: Normal heart sounds. No murmur heard.  Pulmonary:      Effort: Pulmonary effort is normal. No respiratory distress.      Breath sounds: Normal breath sounds. No wheezing or rales.   Skin:     General: Skin is dry.      Findings: No rash.   Neurological:      Mental Status: She is alert and oriented to person, place, and time.   Psychiatric:         Behavior: Behavior normal.       Vitals:    07/31/23 0855 07/31/23 0858   BP: (!) 150/88 134/86   BP Location: Right arm Left arm   Patient Position: Sitting Sitting   BP Method: Medium (Manual) Medium  "(Manual)   Pulse: 62    Resp: 18    Temp: 98.2 °F (36.8 °C)    TempSrc: Oral    SpO2: 97%    Weight: 73.6 kg (162 lb 4.1 oz)    Height: 5' 1.3" (1.557 m)        "

## 2023-07-29 NOTE — ASSESSMENT & PLAN NOTE
This has been stable and satisfactory.  Continue atorvastatin 40 mg.  Discussed healthy nutrition and regular exercise

## 2023-07-31 ENCOUNTER — OFFICE VISIT (OUTPATIENT)
Dept: PRIMARY CARE CLINIC | Facility: CLINIC | Age: 73
End: 2023-07-31
Payer: MEDICARE

## 2023-07-31 VITALS
DIASTOLIC BLOOD PRESSURE: 86 MMHG | OXYGEN SATURATION: 97 % | HEIGHT: 61 IN | SYSTOLIC BLOOD PRESSURE: 134 MMHG | BODY MASS INDEX: 30.63 KG/M2 | WEIGHT: 162.25 LBS | HEART RATE: 62 BPM | TEMPERATURE: 98 F | RESPIRATION RATE: 18 BRPM

## 2023-07-31 DIAGNOSIS — Z78.0 OSTEOPENIA AFTER MENOPAUSE: ICD-10-CM

## 2023-07-31 DIAGNOSIS — Z12.39 ENCOUNTER FOR SCREENING FOR MALIGNANT NEOPLASM OF BREAST, UNSPECIFIED SCREENING MODALITY: ICD-10-CM

## 2023-07-31 DIAGNOSIS — J34.89 LESION OF NOSE: ICD-10-CM

## 2023-07-31 DIAGNOSIS — E78.5 HYPERLIPIDEMIA, UNSPECIFIED HYPERLIPIDEMIA TYPE: Chronic | ICD-10-CM

## 2023-07-31 DIAGNOSIS — M85.80 OSTEOPENIA AFTER MENOPAUSE: ICD-10-CM

## 2023-07-31 DIAGNOSIS — I10 ESSENTIAL HYPERTENSION: Primary | Chronic | ICD-10-CM

## 2023-07-31 DIAGNOSIS — Z12.11 COLON CANCER SCREENING: ICD-10-CM

## 2023-07-31 PROCEDURE — 1159F PR MEDICATION LIST DOCUMENTED IN MEDICAL RECORD: ICD-10-PCS | Mod: HCNC,CPTII,S$GLB, | Performed by: FAMILY MEDICINE

## 2023-07-31 PROCEDURE — 3288F PR FALLS RISK ASSESSMENT DOCUMENTED: ICD-10-PCS | Mod: HCNC,CPTII,S$GLB, | Performed by: FAMILY MEDICINE

## 2023-07-31 PROCEDURE — 1101F PR PT FALLS ASSESS DOC 0-1 FALLS W/OUT INJ PAST YR: ICD-10-PCS | Mod: HCNC,CPTII,S$GLB, | Performed by: FAMILY MEDICINE

## 2023-07-31 PROCEDURE — 1158F ADVNC CARE PLAN TLK DOCD: CPT | Mod: HCNC,CPTII,S$GLB, | Performed by: FAMILY MEDICINE

## 2023-07-31 PROCEDURE — 99214 PR OFFICE/OUTPT VISIT, EST, LEVL IV, 30-39 MIN: ICD-10-PCS | Mod: HCNC,S$GLB,, | Performed by: FAMILY MEDICINE

## 2023-07-31 PROCEDURE — 1126F AMNT PAIN NOTED NONE PRSNT: CPT | Mod: HCNC,CPTII,S$GLB, | Performed by: FAMILY MEDICINE

## 2023-07-31 PROCEDURE — 4010F PR ACE/ARB THEARPY RXD/TAKEN: ICD-10-PCS | Mod: HCNC,CPTII,S$GLB, | Performed by: FAMILY MEDICINE

## 2023-07-31 PROCEDURE — 1160F PR REVIEW ALL MEDS BY PRESCRIBER/CLIN PHARMACIST DOCUMENTED: ICD-10-PCS | Mod: HCNC,CPTII,S$GLB, | Performed by: FAMILY MEDICINE

## 2023-07-31 PROCEDURE — 3079F DIAST BP 80-89 MM HG: CPT | Mod: HCNC,CPTII,S$GLB, | Performed by: FAMILY MEDICINE

## 2023-07-31 PROCEDURE — 3075F SYST BP GE 130 - 139MM HG: CPT | Mod: HCNC,CPTII,S$GLB, | Performed by: FAMILY MEDICINE

## 2023-07-31 PROCEDURE — 1160F RVW MEDS BY RX/DR IN RCRD: CPT | Mod: HCNC,CPTII,S$GLB, | Performed by: FAMILY MEDICINE

## 2023-07-31 PROCEDURE — 3008F BODY MASS INDEX DOCD: CPT | Mod: HCNC,CPTII,S$GLB, | Performed by: FAMILY MEDICINE

## 2023-07-31 PROCEDURE — 3075F PR MOST RECENT SYSTOLIC BLOOD PRESS GE 130-139MM HG: ICD-10-PCS | Mod: HCNC,CPTII,S$GLB, | Performed by: FAMILY MEDICINE

## 2023-07-31 PROCEDURE — 1159F MED LIST DOCD IN RCRD: CPT | Mod: HCNC,CPTII,S$GLB, | Performed by: FAMILY MEDICINE

## 2023-07-31 PROCEDURE — 3288F FALL RISK ASSESSMENT DOCD: CPT | Mod: HCNC,CPTII,S$GLB, | Performed by: FAMILY MEDICINE

## 2023-07-31 PROCEDURE — 3079F PR MOST RECENT DIASTOLIC BLOOD PRESSURE 80-89 MM HG: ICD-10-PCS | Mod: HCNC,CPTII,S$GLB, | Performed by: FAMILY MEDICINE

## 2023-07-31 PROCEDURE — 3008F PR BODY MASS INDEX (BMI) DOCUMENTED: ICD-10-PCS | Mod: HCNC,CPTII,S$GLB, | Performed by: FAMILY MEDICINE

## 2023-07-31 PROCEDURE — 4010F ACE/ARB THERAPY RXD/TAKEN: CPT | Mod: HCNC,CPTII,S$GLB, | Performed by: FAMILY MEDICINE

## 2023-07-31 PROCEDURE — 1158F PR ADVANCE CARE PLANNING DISCUSS DOCUMENTED IN MEDICAL RECORD: ICD-10-PCS | Mod: HCNC,CPTII,S$GLB, | Performed by: FAMILY MEDICINE

## 2023-07-31 PROCEDURE — 99999 PR PBB SHADOW E&M-EST. PATIENT-LVL V: CPT | Mod: PBBFAC,HCNC,, | Performed by: FAMILY MEDICINE

## 2023-07-31 PROCEDURE — 1101F PT FALLS ASSESS-DOCD LE1/YR: CPT | Mod: HCNC,CPTII,S$GLB, | Performed by: FAMILY MEDICINE

## 2023-07-31 PROCEDURE — 99214 OFFICE O/P EST MOD 30 MIN: CPT | Mod: HCNC,S$GLB,, | Performed by: FAMILY MEDICINE

## 2023-07-31 PROCEDURE — 99999 PR PBB SHADOW E&M-EST. PATIENT-LVL V: ICD-10-PCS | Mod: PBBFAC,HCNC,, | Performed by: FAMILY MEDICINE

## 2023-07-31 PROCEDURE — 1126F PR PAIN SEVERITY QUANTIFIED, NO PAIN PRESENT: ICD-10-PCS | Mod: HCNC,CPTII,S$GLB, | Performed by: FAMILY MEDICINE

## 2023-07-31 NOTE — ASSESSMENT & PLAN NOTE
There is a non pigmented raised lesion on her nose.  She consulted with Dermatology earlier in the year, it was felt to be benign, she underwent cryotherapy and it reduced in size but has now returned to full size.  She will follow back with Dermatology as scheduled.

## 2023-07-31 NOTE — ASSESSMENT & PLAN NOTE
Discussed colon cancer screening.  She continues to decline colonoscopy.  She will complete the fit kit.  She completed this last year at the very end of December, so there will not be time to completed in 2023 unless we do it early in December so she will delay complete and returned to us earlier in December.

## 2023-08-07 ENCOUNTER — OFFICE VISIT (OUTPATIENT)
Dept: DERMATOLOGY | Facility: CLINIC | Age: 73
End: 2023-08-07
Payer: MEDICARE

## 2023-08-07 VITALS — RESPIRATION RATE: 18 BRPM | WEIGHT: 162.25 LBS | BODY MASS INDEX: 30.63 KG/M2 | HEIGHT: 61 IN

## 2023-08-07 DIAGNOSIS — L82.1 SEBORRHEIC KERATOSIS: Primary | ICD-10-CM

## 2023-08-07 PROCEDURE — 1159F MED LIST DOCD IN RCRD: CPT | Mod: HCNC,CPTII,S$GLB, | Performed by: DERMATOLOGY

## 2023-08-07 PROCEDURE — 1101F PR PT FALLS ASSESS DOC 0-1 FALLS W/OUT INJ PAST YR: ICD-10-PCS | Mod: HCNC,CPTII,S$GLB, | Performed by: DERMATOLOGY

## 2023-08-07 PROCEDURE — 1101F PT FALLS ASSESS-DOCD LE1/YR: CPT | Mod: HCNC,CPTII,S$GLB, | Performed by: DERMATOLOGY

## 2023-08-07 PROCEDURE — 4010F PR ACE/ARB THEARPY RXD/TAKEN: ICD-10-PCS | Mod: HCNC,CPTII,S$GLB, | Performed by: DERMATOLOGY

## 2023-08-07 PROCEDURE — 3008F PR BODY MASS INDEX (BMI) DOCUMENTED: ICD-10-PCS | Mod: HCNC,CPTII,S$GLB, | Performed by: DERMATOLOGY

## 2023-08-07 PROCEDURE — 99999 PR PBB SHADOW E&M-EST. PATIENT-LVL III: ICD-10-PCS | Mod: PBBFAC,HCNC,, | Performed by: DERMATOLOGY

## 2023-08-07 PROCEDURE — 1159F PR MEDICATION LIST DOCUMENTED IN MEDICAL RECORD: ICD-10-PCS | Mod: HCNC,CPTII,S$GLB, | Performed by: DERMATOLOGY

## 2023-08-07 PROCEDURE — 1126F AMNT PAIN NOTED NONE PRSNT: CPT | Mod: HCNC,CPTII,S$GLB, | Performed by: DERMATOLOGY

## 2023-08-07 PROCEDURE — 99212 OFFICE O/P EST SF 10 MIN: CPT | Mod: HCNC,S$GLB,, | Performed by: DERMATOLOGY

## 2023-08-07 PROCEDURE — 3288F PR FALLS RISK ASSESSMENT DOCUMENTED: ICD-10-PCS | Mod: HCNC,CPTII,S$GLB, | Performed by: DERMATOLOGY

## 2023-08-07 PROCEDURE — 4010F ACE/ARB THERAPY RXD/TAKEN: CPT | Mod: HCNC,CPTII,S$GLB, | Performed by: DERMATOLOGY

## 2023-08-07 PROCEDURE — 99999 PR PBB SHADOW E&M-EST. PATIENT-LVL III: CPT | Mod: PBBFAC,HCNC,, | Performed by: DERMATOLOGY

## 2023-08-07 PROCEDURE — 99212 PR OFFICE/OUTPT VISIT, EST, LEVL II, 10-19 MIN: ICD-10-PCS | Mod: HCNC,S$GLB,, | Performed by: DERMATOLOGY

## 2023-08-07 PROCEDURE — 3288F FALL RISK ASSESSMENT DOCD: CPT | Mod: HCNC,CPTII,S$GLB, | Performed by: DERMATOLOGY

## 2023-08-07 PROCEDURE — 1126F PR PAIN SEVERITY QUANTIFIED, NO PAIN PRESENT: ICD-10-PCS | Mod: HCNC,CPTII,S$GLB, | Performed by: DERMATOLOGY

## 2023-08-07 PROCEDURE — 3008F BODY MASS INDEX DOCD: CPT | Mod: HCNC,CPTII,S$GLB, | Performed by: DERMATOLOGY

## 2023-08-07 NOTE — PROGRESS NOTES
Subjective:       Patient ID:  Katy Yarbrough is a 73 y.o. female who presents for   Chief Complaint   Patient presents with    Lesion     HPI  Established patient.  F/u sk at nose. Past treatment with LN2 with good result, some recurrence.      Review of Systems     Objective:    Physical Exam   Skin:                 Diagram Legend     Erythematous scaling macule/papule c/w actinic keratosis       Vascular papule c/w angioma      Pigmented verrucoid papule/plaque c/w seborrheic keratosis      Yellow umbilicated papule c/w sebaceous hyperplasia      Irregularly shaped tan macule c/w lentigo     1-2 mm smooth white papules consistent with Milia      Movable subcutaneous cyst with punctum c/w epidermal inclusion cyst      Subcutaneous movable cyst c/w pilar cyst      Firm pink to brown papule c/w dermatofibroma      Pedunculated fleshy papule(s) c/w skin tag(s)      Evenly pigmented macule c/w junctional nevus     Mildly variegated pigmented, slightly irregular-bordered macule c/w mildly atypical nevus      Flesh colored to evenly pigmented papule c/w intradermal nevus       Pink pearly papule/plaque c/w basal cell carcinoma      Erythematous hyperkeratotic cursted plaque c/w SCC      Surgical scar with no sign of skin cancer recurrence      Open and closed comedones      Inflammatory papules and pustules      Verrucoid papule consistent consistent with wart     Erythematous eczematous patches and plaques     Dystrophic onycholytic nail with subungual debris c/w onychomycosis     Umbilicated papule    Erythematous-base heme-crusted tan verrucoid plaque consistent with inflamed seborrheic keratosis     Erythematous Silvery Scaling Plaque c/w Psoriasis     See annotation      Assessment / Plan:        Seborrheic keratosis  - Benign; reassured treatment not necessary.    - Ln2 offered to remaining lesion.             Follow up if symptoms worsen or fail to improve.

## 2023-11-03 ENCOUNTER — TELEPHONE (OUTPATIENT)
Dept: PRIMARY CARE CLINIC | Facility: CLINIC | Age: 73
End: 2023-11-03
Payer: MEDICARE

## 2023-11-03 DIAGNOSIS — Z12.31 ENCOUNTER FOR SCREENING MAMMOGRAM FOR MALIGNANT NEOPLASM OF BREAST: Primary | ICD-10-CM

## 2023-11-03 NOTE — TELEPHONE ENCOUNTER
Called pt and LM that she is overdue for mammogram and that Dr. Singleton would like for pt to have done before her next appt with him on 12/29.  Order created per [facility].

## 2023-12-21 ENCOUNTER — LAB VISIT (OUTPATIENT)
Dept: LAB | Facility: HOSPITAL | Age: 73
End: 2023-12-21
Attending: FAMILY MEDICINE
Payer: MEDICARE

## 2023-12-21 DIAGNOSIS — Z12.11 COLON CANCER SCREENING: ICD-10-CM

## 2023-12-21 PROCEDURE — 82274 ASSAY TEST FOR BLOOD FECAL: CPT | Mod: HCNC | Performed by: FAMILY MEDICINE

## 2023-12-22 ENCOUNTER — LAB VISIT (OUTPATIENT)
Dept: LAB | Facility: HOSPITAL | Age: 73
End: 2023-12-22
Attending: FAMILY MEDICINE
Payer: MEDICARE

## 2023-12-22 DIAGNOSIS — E78.5 HYPERLIPIDEMIA, UNSPECIFIED HYPERLIPIDEMIA TYPE: Chronic | ICD-10-CM

## 2023-12-22 DIAGNOSIS — I10 ESSENTIAL HYPERTENSION: Chronic | ICD-10-CM

## 2023-12-22 LAB
ALBUMIN SERPL BCP-MCNC: 4.1 G/DL (ref 3.5–5.2)
ALP SERPL-CCNC: 60 U/L (ref 55–135)
ALT SERPL W/O P-5'-P-CCNC: 18 U/L (ref 10–44)
ANION GAP SERPL CALC-SCNC: 10 MMOL/L (ref 8–16)
AST SERPL-CCNC: 21 U/L (ref 10–40)
BASOPHILS # BLD AUTO: 0.04 K/UL (ref 0–0.2)
BASOPHILS NFR BLD: 0.6 % (ref 0–1.9)
BILIRUB SERPL-MCNC: 0.7 MG/DL (ref 0.1–1)
BUN SERPL-MCNC: 19 MG/DL (ref 8–23)
CALCIUM SERPL-MCNC: 10.1 MG/DL (ref 8.7–10.5)
CHLORIDE SERPL-SCNC: 105 MMOL/L (ref 95–110)
CHOLEST SERPL-MCNC: 167 MG/DL (ref 120–199)
CHOLEST/HDLC SERPL: 2 {RATIO} (ref 2–5)
CO2 SERPL-SCNC: 28 MMOL/L (ref 23–29)
CREAT SERPL-MCNC: 0.9 MG/DL (ref 0.5–1.4)
DIFFERENTIAL METHOD: ABNORMAL
EOSINOPHIL # BLD AUTO: 0.1 K/UL (ref 0–0.5)
EOSINOPHIL NFR BLD: 1.7 % (ref 0–8)
ERYTHROCYTE [DISTWIDTH] IN BLOOD BY AUTOMATED COUNT: 12.6 % (ref 11.5–14.5)
EST. GFR  (NO RACE VARIABLE): >60 ML/MIN/1.73 M^2
GLUCOSE SERPL-MCNC: 88 MG/DL (ref 70–110)
HCT VFR BLD AUTO: 50.4 % (ref 37–48.5)
HDLC SERPL-MCNC: 84 MG/DL (ref 40–75)
HDLC SERPL: 50.3 % (ref 20–50)
HGB BLD-MCNC: 16.4 G/DL (ref 12–16)
IMM GRANULOCYTES # BLD AUTO: 0 K/UL (ref 0–0.04)
IMM GRANULOCYTES NFR BLD AUTO: 0 % (ref 0–0.5)
LDLC SERPL CALC-MCNC: 68.2 MG/DL (ref 63–159)
LYMPHOCYTES # BLD AUTO: 2 K/UL (ref 1–4.8)
LYMPHOCYTES NFR BLD: 27.9 % (ref 18–48)
MCH RBC QN AUTO: 31 PG (ref 27–31)
MCHC RBC AUTO-ENTMCNC: 32.5 G/DL (ref 32–36)
MCV RBC AUTO: 95 FL (ref 82–98)
MONOCYTES # BLD AUTO: 0.5 K/UL (ref 0.3–1)
MONOCYTES NFR BLD: 6.4 % (ref 4–15)
NEUTROPHILS # BLD AUTO: 4.5 K/UL (ref 1.8–7.7)
NEUTROPHILS NFR BLD: 63.4 % (ref 38–73)
NONHDLC SERPL-MCNC: 83 MG/DL
NRBC BLD-RTO: 0 /100 WBC
PLATELET # BLD AUTO: 238 K/UL (ref 150–450)
PMV BLD AUTO: 11.5 FL (ref 9.2–12.9)
POTASSIUM SERPL-SCNC: 3.9 MMOL/L (ref 3.5–5.1)
PROT SERPL-MCNC: 7.3 G/DL (ref 6–8.4)
RBC # BLD AUTO: 5.29 M/UL (ref 4–5.4)
SODIUM SERPL-SCNC: 143 MMOL/L (ref 136–145)
TRIGL SERPL-MCNC: 74 MG/DL (ref 30–150)
WBC # BLD AUTO: 7.14 K/UL (ref 3.9–12.7)

## 2023-12-22 PROCEDURE — 36415 COLL VENOUS BLD VENIPUNCTURE: CPT | Mod: HCNC,PN | Performed by: FAMILY MEDICINE

## 2023-12-22 PROCEDURE — 80061 LIPID PANEL: CPT | Mod: HCNC | Performed by: FAMILY MEDICINE

## 2023-12-22 PROCEDURE — 85025 COMPLETE CBC W/AUTO DIFF WBC: CPT | Mod: HCNC | Performed by: FAMILY MEDICINE

## 2023-12-22 PROCEDURE — 80053 COMPREHEN METABOLIC PANEL: CPT | Mod: HCNC | Performed by: FAMILY MEDICINE

## 2023-12-28 LAB — HEMOCCULT STL QL IA: NEGATIVE

## 2023-12-29 ENCOUNTER — OFFICE VISIT (OUTPATIENT)
Dept: PRIMARY CARE CLINIC | Facility: CLINIC | Age: 73
End: 2023-12-29
Payer: MEDICARE

## 2023-12-29 VITALS
TEMPERATURE: 98 F | HEIGHT: 61 IN | BODY MASS INDEX: 31.37 KG/M2 | DIASTOLIC BLOOD PRESSURE: 82 MMHG | HEART RATE: 56 BPM | WEIGHT: 166.13 LBS | SYSTOLIC BLOOD PRESSURE: 128 MMHG | OXYGEN SATURATION: 98 %

## 2023-12-29 DIAGNOSIS — I10 ESSENTIAL HYPERTENSION: Primary | Chronic | ICD-10-CM

## 2023-12-29 DIAGNOSIS — Z12.39 ENCOUNTER FOR BREAST CANCER SCREENING USING NON-MAMMOGRAM MODALITY: ICD-10-CM

## 2023-12-29 DIAGNOSIS — E78.5 HYPERLIPIDEMIA, UNSPECIFIED HYPERLIPIDEMIA TYPE: Chronic | ICD-10-CM

## 2023-12-29 DIAGNOSIS — R71.8 ELEVATED HEMATOCRIT: ICD-10-CM

## 2023-12-29 PROCEDURE — 1126F AMNT PAIN NOTED NONE PRSNT: CPT | Mod: HCNC,CPTII,S$GLB, | Performed by: FAMILY MEDICINE

## 2023-12-29 PROCEDURE — 1159F MED LIST DOCD IN RCRD: CPT | Mod: HCNC,CPTII,S$GLB, | Performed by: FAMILY MEDICINE

## 2023-12-29 PROCEDURE — 1159F PR MEDICATION LIST DOCUMENTED IN MEDICAL RECORD: ICD-10-PCS | Mod: HCNC,CPTII,S$GLB, | Performed by: FAMILY MEDICINE

## 2023-12-29 PROCEDURE — 99999 PR PBB SHADOW E&M-EST. PATIENT-LVL III: CPT | Mod: PBBFAC,HCNC,, | Performed by: FAMILY MEDICINE

## 2023-12-29 PROCEDURE — 99999 PR PBB SHADOW E&M-EST. PATIENT-LVL III: ICD-10-PCS | Mod: PBBFAC,HCNC,, | Performed by: FAMILY MEDICINE

## 2023-12-29 PROCEDURE — 99214 OFFICE O/P EST MOD 30 MIN: CPT | Mod: HCNC,S$GLB,, | Performed by: FAMILY MEDICINE

## 2023-12-29 PROCEDURE — 99214 PR OFFICE/OUTPT VISIT, EST, LEVL IV, 30-39 MIN: ICD-10-PCS | Mod: HCNC,S$GLB,, | Performed by: FAMILY MEDICINE

## 2023-12-29 PROCEDURE — 3074F SYST BP LT 130 MM HG: CPT | Mod: HCNC,CPTII,S$GLB, | Performed by: FAMILY MEDICINE

## 2023-12-29 PROCEDURE — 4010F ACE/ARB THERAPY RXD/TAKEN: CPT | Mod: HCNC,CPTII,S$GLB, | Performed by: FAMILY MEDICINE

## 2023-12-29 PROCEDURE — 3074F PR MOST RECENT SYSTOLIC BLOOD PRESSURE < 130 MM HG: ICD-10-PCS | Mod: HCNC,CPTII,S$GLB, | Performed by: FAMILY MEDICINE

## 2023-12-29 PROCEDURE — 3288F PR FALLS RISK ASSESSMENT DOCUMENTED: ICD-10-PCS | Mod: HCNC,CPTII,S$GLB, | Performed by: FAMILY MEDICINE

## 2023-12-29 PROCEDURE — 3079F PR MOST RECENT DIASTOLIC BLOOD PRESSURE 80-89 MM HG: ICD-10-PCS | Mod: HCNC,CPTII,S$GLB, | Performed by: FAMILY MEDICINE

## 2023-12-29 PROCEDURE — 1160F PR REVIEW ALL MEDS BY PRESCRIBER/CLIN PHARMACIST DOCUMENTED: ICD-10-PCS | Mod: HCNC,CPTII,S$GLB, | Performed by: FAMILY MEDICINE

## 2023-12-29 PROCEDURE — 1101F PR PT FALLS ASSESS DOC 0-1 FALLS W/OUT INJ PAST YR: ICD-10-PCS | Mod: HCNC,CPTII,S$GLB, | Performed by: FAMILY MEDICINE

## 2023-12-29 PROCEDURE — 1160F RVW MEDS BY RX/DR IN RCRD: CPT | Mod: HCNC,CPTII,S$GLB, | Performed by: FAMILY MEDICINE

## 2023-12-29 PROCEDURE — 1101F PT FALLS ASSESS-DOCD LE1/YR: CPT | Mod: HCNC,CPTII,S$GLB, | Performed by: FAMILY MEDICINE

## 2023-12-29 PROCEDURE — 4010F PR ACE/ARB THEARPY RXD/TAKEN: ICD-10-PCS | Mod: HCNC,CPTII,S$GLB, | Performed by: FAMILY MEDICINE

## 2023-12-29 PROCEDURE — 3079F DIAST BP 80-89 MM HG: CPT | Mod: HCNC,CPTII,S$GLB, | Performed by: FAMILY MEDICINE

## 2023-12-29 PROCEDURE — 3008F BODY MASS INDEX DOCD: CPT | Mod: HCNC,CPTII,S$GLB, | Performed by: FAMILY MEDICINE

## 2023-12-29 PROCEDURE — 3008F PR BODY MASS INDEX (BMI) DOCUMENTED: ICD-10-PCS | Mod: HCNC,CPTII,S$GLB, | Performed by: FAMILY MEDICINE

## 2023-12-29 PROCEDURE — 1126F PR PAIN SEVERITY QUANTIFIED, NO PAIN PRESENT: ICD-10-PCS | Mod: HCNC,CPTII,S$GLB, | Performed by: FAMILY MEDICINE

## 2023-12-29 PROCEDURE — 3288F FALL RISK ASSESSMENT DOCD: CPT | Mod: HCNC,CPTII,S$GLB, | Performed by: FAMILY MEDICINE

## 2023-12-29 RX ORDER — AMOXICILLIN 875 MG/1
875 TABLET, FILM COATED ORAL EVERY 12 HOURS
COMMUNITY
Start: 2023-12-24

## 2023-12-29 NOTE — PROGRESS NOTES
Primary Care Provider Appointment   [facility] [place]       Patient ID: Katy Yarbrough is a 73 y.o. female.    ASSESSMENT/PLAN by Problem List:    1. Essential hypertension  Assessment & Plan:  Stable and satisfactory continue current medication.  Discussed low-sodium diet and regular exercise    Orders:  -     Comprehensive Metabolic Panel; Future; Expected date: 12/29/2023    2. Hyperlipidemia, unspecified hyperlipidemia type  Assessment & Plan:  Stable satisfactory continue atorvastatin 40 mg.    Orders:  -     Lipid Panel; Future; Expected date: 12/29/2023    3. Elevated hematocrit  Assessment & Plan:  Persistent mild elevation in hemoglobin and hematocrit.  At this point I do recommend consulting with Hematology Oncology for further evaluation as a precaution.  Patient is agreeable.    Orders:  -     Ambulatory referral/consult to Hematology / Oncology; Future; Expected date: 01/05/2024  -     CBC Auto Differential; Future; Expected date: 12/29/2023    4. Encounter for breast cancer screening using non-mammogram modality  Assessment & Plan:  She continues to decline breast cancer screening.  She denies any active symptoms.           Follow Up:  5-6 months    Health Maintenance         Date Due Completion Date    RSV Vaccine (Age 60+ and Pregnant patients) (1 - 1-dose 60+ series) Never done ---    Mammogram 05/24/2019 5/24/2018 (Declined)    Override on 5/24/2018: Declined    Override on 1/21/2015: Done (followed by her gynecologist by her report)    Colorectal Cancer Screening 01/21/2025 12/21/2023    Override on 1/21/2015: Declined (she refused even after extensive counseling)    DEXA Scan 03/08/2025 3/8/2022    Override on 5/24/2018: Done    Lipid Panel 12/22/2028 12/22/2023    TETANUS VACCINE 08/01/2033 8/1/2023    Override on 9/1/2012: Done (date approximate)        Discussed RSV vaccine.  She will go to pharmacy.      Subjective:     Chief Complaint   Patient presents with  "   Follow-up     I have reviewed the information entered by the ancillary staff regarding the chief complaint as well as the related history.    HPI    Patient is a/an 73 y.o.  female     Routine follow-up.  She is doing well and does not report any recent illnesses, no falls or accidents reported.    For complete problem list, past medical history, surgical history, social history, etc., see appropriate section in the electronic medical record    Review of Systems   Respiratory: Negative.     Cardiovascular: Negative.    Gastrointestinal: Negative.    Genitourinary: Negative.        Objective     Physical Exam  Vitals reviewed.   Constitutional:       General: She is not in acute distress.     Appearance: She is well-developed. She is not ill-appearing.   HENT:      Head: Normocephalic and atraumatic.   Eyes:      General: No scleral icterus.     Conjunctiva/sclera: Conjunctivae normal.   Cardiovascular:      Rate and Rhythm: Normal rate and regular rhythm.      Heart sounds: Normal heart sounds. No murmur heard.     Comments: Trace pretibial edema bilaterally  Pulmonary:      Effort: Pulmonary effort is normal. No respiratory distress.      Breath sounds: Normal breath sounds. No wheezing or rales.   Skin:     General: Skin is dry.      Findings: No rash.   Neurological:      Mental Status: She is alert and oriented to person, place, and time.   Psychiatric:         Behavior: Behavior normal.       Vitals:    12/29/23 0851   BP: 128/82   BP Location: Left arm   Patient Position: Sitting   Pulse: (!) 56   Temp: 97.9 °F (36.6 °C)   SpO2: 98%   Weight: 75.4 kg (166 lb 1.9 oz)   Height: 5' 1" (1.549 m)           THIS DOCUMENT WAS MADE IN PART WITH VOICE RECOGNITION SOFTWARE.  OCCASIONALLY THIS SOFTWARE WILL MISINTERPRET WORDS OR PHRASES.    "

## 2024-01-02 ENCOUNTER — TELEPHONE (OUTPATIENT)
Dept: HEMATOLOGY/ONCOLOGY | Facility: CLINIC | Age: 74
End: 2024-01-02
Payer: MEDICARE

## 2024-01-02 NOTE — TELEPHONE ENCOUNTER
Pt scheduled incorrectly on Dr York's hemonc schedule with Dx of elevated H&H.  Will be rescheduled to the benign hem schedule.   LVM for pt to call back.  Pt portal msg sent to pt.  Appt will be canceled and referring provider will be notified of e-consult info.

## 2024-01-03 NOTE — TELEPHONE ENCOUNTER
Pt returned call and voiced understanding of incorrect scheduling with hematology.  Pt rescheduled with next avail [facility] hem appt, March 7th; confirmed the appt date time and location.

## 2024-03-07 ENCOUNTER — OFFICE VISIT (OUTPATIENT)
Dept: HEMATOLOGY/ONCOLOGY | Facility: CLINIC | Age: 74
End: 2024-03-07
Payer: MEDICARE

## 2024-03-07 ENCOUNTER — LAB VISIT (OUTPATIENT)
Dept: LAB | Facility: HOSPITAL | Age: 74
End: 2024-03-07
Attending: INTERNAL MEDICINE
Payer: MEDICARE

## 2024-03-07 VITALS
WEIGHT: 165.81 LBS | SYSTOLIC BLOOD PRESSURE: 154 MMHG | HEIGHT: 65 IN | OXYGEN SATURATION: 96 % | BODY MASS INDEX: 27.63 KG/M2 | TEMPERATURE: 98 F | DIASTOLIC BLOOD PRESSURE: 82 MMHG | RESPIRATION RATE: 18 BRPM | HEART RATE: 77 BPM

## 2024-03-07 DIAGNOSIS — D45 POLYCYTHEMIA VERA: ICD-10-CM

## 2024-03-07 DIAGNOSIS — R71.8 ELEVATED HEMATOCRIT: Primary | ICD-10-CM

## 2024-03-07 DIAGNOSIS — R71.8 ELEVATED HEMATOCRIT: ICD-10-CM

## 2024-03-07 LAB
BASOPHILS # BLD AUTO: 0.03 K/UL (ref 0–0.2)
BASOPHILS NFR BLD: 0.3 % (ref 0–1.9)
DIFFERENTIAL METHOD BLD: ABNORMAL
EOSINOPHIL # BLD AUTO: 0.2 K/UL (ref 0–0.5)
EOSINOPHIL NFR BLD: 2.2 % (ref 0–8)
ERYTHROCYTE [DISTWIDTH] IN BLOOD BY AUTOMATED COUNT: 12.9 % (ref 11.5–14.5)
HCT VFR BLD AUTO: 48 % (ref 37–48.5)
HGB BLD-MCNC: 16.4 G/DL (ref 12–16)
IMM GRANULOCYTES # BLD AUTO: 0.02 K/UL (ref 0–0.04)
IMM GRANULOCYTES NFR BLD AUTO: 0.2 % (ref 0–0.5)
LDH SERPL L TO P-CCNC: 193 U/L (ref 110–260)
LYMPHOCYTES # BLD AUTO: 2.3 K/UL (ref 1–4.8)
LYMPHOCYTES NFR BLD: 25.2 % (ref 18–48)
MCH RBC QN AUTO: 31.1 PG (ref 27–31)
MCHC RBC AUTO-ENTMCNC: 34.2 G/DL (ref 32–36)
MCV RBC AUTO: 91 FL (ref 82–98)
MONOCYTES # BLD AUTO: 0.6 K/UL (ref 0.3–1)
MONOCYTES NFR BLD: 6.1 % (ref 4–15)
NEUTROPHILS # BLD AUTO: 6 K/UL (ref 1.8–7.7)
NEUTROPHILS NFR BLD: 66 % (ref 38–73)
NRBC BLD-RTO: 0 /100 WBC
PLATELET # BLD AUTO: 223 K/UL (ref 150–450)
PMV BLD AUTO: 10.2 FL (ref 9.2–12.9)
RBC # BLD AUTO: 5.28 M/UL (ref 4–5.4)
RETICS/RBC NFR AUTO: 1 % (ref 0.5–2.5)
WBC # BLD AUTO: 9.13 K/UL (ref 3.9–12.7)

## 2024-03-07 PROCEDURE — 99204 OFFICE O/P NEW MOD 45 MIN: CPT | Mod: HCNC,S$GLB,, | Performed by: INTERNAL MEDICINE

## 2024-03-07 PROCEDURE — 85060 BLOOD SMEAR INTERPRETATION: CPT | Mod: HCNC,,, | Performed by: PATHOLOGY

## 2024-03-07 PROCEDURE — 3288F FALL RISK ASSESSMENT DOCD: CPT | Mod: HCNC,CPTII,S$GLB, | Performed by: INTERNAL MEDICINE

## 2024-03-07 PROCEDURE — 82668 ASSAY OF ERYTHROPOIETIN: CPT | Mod: HCNC | Performed by: INTERNAL MEDICINE

## 2024-03-07 PROCEDURE — 81270 JAK2 GENE: CPT | Mod: HCNC | Performed by: INTERNAL MEDICINE

## 2024-03-07 PROCEDURE — 3079F DIAST BP 80-89 MM HG: CPT | Mod: HCNC,CPTII,S$GLB, | Performed by: INTERNAL MEDICINE

## 2024-03-07 PROCEDURE — 81219 CALR GENE COM VARIANTS: CPT | Mod: HCNC | Performed by: INTERNAL MEDICINE

## 2024-03-07 PROCEDURE — 3008F BODY MASS INDEX DOCD: CPT | Mod: HCNC,CPTII,S$GLB, | Performed by: INTERNAL MEDICINE

## 2024-03-07 PROCEDURE — 1101F PT FALLS ASSESS-DOCD LE1/YR: CPT | Mod: HCNC,CPTII,S$GLB, | Performed by: INTERNAL MEDICINE

## 2024-03-07 PROCEDURE — 1126F AMNT PAIN NOTED NONE PRSNT: CPT | Mod: HCNC,CPTII,S$GLB, | Performed by: INTERNAL MEDICINE

## 2024-03-07 PROCEDURE — 99999 PR PBB SHADOW E&M-EST. PATIENT-LVL IV: CPT | Mod: PBBFAC,HCNC,, | Performed by: INTERNAL MEDICINE

## 2024-03-07 PROCEDURE — 1159F MED LIST DOCD IN RCRD: CPT | Mod: HCNC,CPTII,S$GLB, | Performed by: INTERNAL MEDICINE

## 2024-03-07 PROCEDURE — 3077F SYST BP >= 140 MM HG: CPT | Mod: HCNC,CPTII,S$GLB, | Performed by: INTERNAL MEDICINE

## 2024-03-07 PROCEDURE — 85025 COMPLETE CBC W/AUTO DIFF WBC: CPT | Mod: HCNC,PN | Performed by: INTERNAL MEDICINE

## 2024-03-07 PROCEDURE — 81339 MPL GENE SEQ ALYS EXON 10: CPT | Mod: HCNC | Performed by: INTERNAL MEDICINE

## 2024-03-07 PROCEDURE — 85045 AUTOMATED RETICULOCYTE COUNT: CPT | Mod: HCNC,PN | Performed by: INTERNAL MEDICINE

## 2024-03-07 PROCEDURE — 1160F RVW MEDS BY RX/DR IN RCRD: CPT | Mod: HCNC,CPTII,S$GLB, | Performed by: INTERNAL MEDICINE

## 2024-03-07 PROCEDURE — 83615 LACTATE (LD) (LDH) ENZYME: CPT | Mod: HCNC,PN | Performed by: INTERNAL MEDICINE

## 2024-03-07 RX ORDER — HYDROCODONE BITARTRATE AND ACETAMINOPHEN 7.5; 325 MG/1; MG/1
1 TABLET ORAL EVERY 6 HOURS PRN
COMMUNITY
Start: 2023-12-26 | End: 2024-04-04 | Stop reason: ALTCHOICE

## 2024-03-07 NOTE — PROGRESS NOTES
CONSULT NOTE    Subjective:       Patient ID: Katy Yarbrough is a 73 y.o. female.  MRN: [id number]  : 1950    Chief Complaint: Elevated Hematocrit      History of Present Illness:   Katy Yarbrough is a 73 y.o. female who is referred for polycythemia.   Incident finding of elevated Hb/hct on routine labs.     Never smoker. No h/o JANEEN. Denies facial flushing. No pruritus or erythromelalgia.   No history of CAD, CVA or thrombosis. No family history.     Other medical conditions include HTN and HLD.   Does yearly cologuard.     Oncology History:  Oncology History    No history exists.       History:  Past Medical History:   Diagnosis Date    Anemia     Chronic diarrhea     Hyperlipidemia     Hypertension     Over weight       No past surgical history on file.  No family history on file.   Social History     Tobacco Use    Smoking status: Never    Smokeless tobacco: Never   Substance and Sexual Activity    Alcohol use: Yes     Comment: occassionally    Drug use: Not on file    Sexual activity: Not on file        ROS:   Review of Systems   Constitutional:  Negative for fever, malaise/fatigue and weight loss.   HENT:  Negative for congestion, hearing loss, nosebleeds and sore throat.    Eyes:  Negative for double vision and photophobia.   Respiratory:  Negative for cough, hemoptysis, sputum production, shortness of breath and wheezing.    Cardiovascular:  Negative for chest pain, palpitations, orthopnea and leg swelling.   Gastrointestinal:  Negative for abdominal pain, blood in stool, constipation, diarrhea, heartburn, nausea and vomiting.   Genitourinary:  Negative for dysuria, hematuria and urgency.   Musculoskeletal:  Negative for back pain, joint pain and myalgias.   Skin:  Negative for itching and rash.   Neurological:  Negative for dizziness, tingling, seizures, weakness and headaches.   Endo/Heme/Allergies:  Negative for polydipsia. Does not bruise/bleed  "easily.   Psychiatric/Behavioral:  Negative for depression and memory loss. The patient is not nervous/anxious and does not have insomnia.         Objective:     Vitals:    03/07/24 1014   BP: (!) 154/82   Pulse: 77   Resp: 18   Temp: 97.7 °F (36.5 °C)   TempSrc: Temporal   SpO2: 96%   Weight: 75.2 kg (165 lb 12.6 oz)   Height: 5' 5" (1.651 m)   PainSc: 0-No pain       Physical Examination:   Physical Exam  Constitutional:       Appearance: Normal appearance.   HENT:      Head: Normocephalic and atraumatic.      Nose: Nose normal.   Eyes:      Conjunctiva/sclera: Conjunctivae normal.   Cardiovascular:      Rate and Rhythm: Normal rate.   Pulmonary:      Effort: Pulmonary effort is normal.   Musculoskeletal:         General: Normal range of motion.      Cervical back: Normal range of motion.   Skin:     General: Skin is warm and dry.   Neurological:      General: No focal deficit present.      Mental Status: She is alert and oriented to person, place, and time.          Diagnostic Tests:  Significant Imaging: I have reviewed and interpreted all pertinent imaging results/findings.      Laboratory Data:  All pertinent labs have been reviewed.    Labs:   Lab Results   Component Value Date    WBC 7.14 12/22/2023    HGB 16.4 (H) 12/22/2023    HCT 50.4 (H) 12/22/2023    MCV 95 12/22/2023     12/22/2023       Assessment/Plan:   Elevated hematocrit    PCV vs secondary polycythemia.  Complete work up and will see her back in 2 weeks with results.   -     Ambulatory referral/consult to Hematology / Oncology  -     CBC Auto Differential; Standing  -     RETICULOCYTES; Future; Expected date: 03/07/2024  -     Lactate Dehydrogenase; Standing  -     Pathologist Interpretation Differential; Future; Expected date: 03/07/2024  -     MPN (JAK2 V617F)WITH REFLEX TO CALR,MPL; Future; Expected date: 03/07/2024  -     ERYTHROPOIETIN; Future; Expected date: 03/07/2024    Polycythemia vera  -     MPN (JAK2 V617F)WITH REFLEX TO " ELENAR,MPL; Future; Expected date: 03/07/2024       ECOG SCORE    0 - Fully active-able to carry on all pre-disease performance without restriction           Discussion:   No follow-ups on file.    Plan was discussed with the patient at length, and she verbalized understanding. Katy was given an opportunity to ask questions that were answered to her satisfaction, and she was advised to call in the interval if any problems or questions arise.    Electronically signed by Irene Soria MD      Route Chart for Scheduling    Med Onc Chart Routing  Urgent    Follow up with physician . 3/21   Follow up with JANET    Infusion scheduling note    Injection scheduling note    Labs JAK2 V617F, CBC, erythropoietin, pathologist interp. differential, reticulocytes and LDH   Scheduling:  Preferred lab:  Lab interval:  all labs today   Imaging    Pharmacy appointment    Other referrals

## 2024-03-08 LAB — PATH REV BLD -IMP: NORMAL

## 2024-03-11 LAB
EPO SERPL-ACNC: 5.9 MIU/ML (ref 2.6–18.5)
PATH REV BLD -IMP: NORMAL

## 2024-03-15 LAB
MPNR  FINAL DIAGNOSIS: NORMAL
MPNR  SPECIMEN TYPE: 1
MPNR RESULT: NORMAL

## 2024-03-21 ENCOUNTER — OFFICE VISIT (OUTPATIENT)
Dept: HEMATOLOGY/ONCOLOGY | Facility: CLINIC | Age: 74
End: 2024-03-21
Payer: MEDICARE

## 2024-03-21 VITALS
DIASTOLIC BLOOD PRESSURE: 79 MMHG | TEMPERATURE: 98 F | RESPIRATION RATE: 18 BRPM | SYSTOLIC BLOOD PRESSURE: 150 MMHG | WEIGHT: 164.88 LBS | BODY MASS INDEX: 31.13 KG/M2 | HEART RATE: 75 BPM | OXYGEN SATURATION: 98 % | HEIGHT: 61 IN

## 2024-03-21 DIAGNOSIS — R71.8 ELEVATED HEMATOCRIT: Primary | ICD-10-CM

## 2024-03-21 PROCEDURE — 3288F FALL RISK ASSESSMENT DOCD: CPT | Mod: HCNC,CPTII,S$GLB, | Performed by: INTERNAL MEDICINE

## 2024-03-21 PROCEDURE — 4010F ACE/ARB THERAPY RXD/TAKEN: CPT | Mod: HCNC,CPTII,S$GLB, | Performed by: INTERNAL MEDICINE

## 2024-03-21 PROCEDURE — 3008F BODY MASS INDEX DOCD: CPT | Mod: HCNC,CPTII,S$GLB, | Performed by: INTERNAL MEDICINE

## 2024-03-21 PROCEDURE — 3077F SYST BP >= 140 MM HG: CPT | Mod: HCNC,CPTII,S$GLB, | Performed by: INTERNAL MEDICINE

## 2024-03-21 PROCEDURE — 1159F MED LIST DOCD IN RCRD: CPT | Mod: HCNC,CPTII,S$GLB, | Performed by: INTERNAL MEDICINE

## 2024-03-21 PROCEDURE — 99214 OFFICE O/P EST MOD 30 MIN: CPT | Mod: HCNC,S$GLB,, | Performed by: INTERNAL MEDICINE

## 2024-03-21 PROCEDURE — 1101F PT FALLS ASSESS-DOCD LE1/YR: CPT | Mod: HCNC,CPTII,S$GLB, | Performed by: INTERNAL MEDICINE

## 2024-03-21 PROCEDURE — 3078F DIAST BP <80 MM HG: CPT | Mod: HCNC,CPTII,S$GLB, | Performed by: INTERNAL MEDICINE

## 2024-03-21 PROCEDURE — 1126F AMNT PAIN NOTED NONE PRSNT: CPT | Mod: HCNC,CPTII,S$GLB, | Performed by: INTERNAL MEDICINE

## 2024-03-21 PROCEDURE — 99999 PR PBB SHADOW E&M-EST. PATIENT-LVL III: CPT | Mod: PBBFAC,HCNC,, | Performed by: INTERNAL MEDICINE

## 2024-03-21 NOTE — PROGRESS NOTES
PROGRESS NOTE     Subjective:       Patient ID: Katy Yarbrough is a 73 y.o. female.  MRN: [id number]  : 1950    Chief Complaint: Elevated hematocrit      History of Present Illness:   Katy Yarbrough is a 73 y.o. female who is referred for polycythemia.   Incident finding of elevated Hb/hct on routine labs.     Never smoker. No h/o JANEEN. Denies facial flushing. No pruritus or erythromelalgia.   No history of CAD, CVA or thrombosis. No family history.     Other medical conditions include HTN and HLD.   Does yearly cologuard.     Interim history:  She has completed MPN panel and is here to discuss results and further recommendations.     Oncology History:  Oncology History    No history exists.       History:  Past Medical History:   Diagnosis Date    Anemia     Chronic diarrhea     Hyperlipidemia     Hypertension     Over weight       No past surgical history on file.  No family history on file.   Social History     Tobacco Use    Smoking status: Never    Smokeless tobacco: Never   Substance and Sexual Activity    Alcohol use: Yes     Comment: occassionally    Drug use: Not on file    Sexual activity: Not on file        ROS:   Review of Systems   Constitutional:  Negative for fever, malaise/fatigue and weight loss.   HENT:  Negative for congestion, hearing loss, nosebleeds and sore throat.    Eyes:  Negative for double vision and photophobia.   Respiratory:  Negative for cough, hemoptysis, sputum production, shortness of breath and wheezing.    Cardiovascular:  Negative for chest pain, palpitations, orthopnea and leg swelling.   Gastrointestinal:  Negative for abdominal pain, blood in stool, constipation, diarrhea, heartburn, nausea and vomiting.   Genitourinary:  Negative for dysuria, hematuria and urgency.   Musculoskeletal:  Negative for back pain, joint pain and myalgias.   Skin:  Negative for itching and rash.   Neurological:  Negative for dizziness, tingling,  "seizures, weakness and headaches.   Endo/Heme/Allergies:  Negative for polydipsia. Does not bruise/bleed easily.   Psychiatric/Behavioral:  Negative for depression and memory loss. The patient is not nervous/anxious and does not have insomnia.         Objective:     Vitals:    03/21/24 1009   BP: (!) 150/79   Pulse: 75   Resp: 18   Temp: 97.7 °F (36.5 °C)   TempSrc: Temporal   SpO2: 98%   Weight: 74.8 kg (164 lb 14.5 oz)   Height: 5' 1" (1.549 m)   PainSc: 0-No pain         Physical Examination:   Physical Exam  Constitutional:       Appearance: Normal appearance.   HENT:      Head: Normocephalic and atraumatic.      Nose: Nose normal.   Eyes:      Conjunctiva/sclera: Conjunctivae normal.   Cardiovascular:      Rate and Rhythm: Normal rate.   Pulmonary:      Effort: Pulmonary effort is normal.   Musculoskeletal:         General: Normal range of motion.      Cervical back: Normal range of motion.   Skin:     General: Skin is warm and dry.   Neurological:      General: No focal deficit present.      Mental Status: She is alert and oriented to person, place, and time.          Diagnostic Tests:  Significant Imaging: I have reviewed and interpreted all pertinent imaging results/findings.      Laboratory Data:  All pertinent labs have been reviewed.    Labs:   Lab Results   Component Value Date    WBC 9.13 03/07/2024    HGB 16.4 (H) 03/07/2024    HCT 48.0 03/07/2024    MCV 91 03/07/2024     03/07/2024       Assessment/Plan:   Elevated hematocrit    Hct is at the upper limit of normal, MPN panel and EPO normal. Does not meet criteria from PCV at this time.   Repeat cbc in 3 months and offer additional work up as clinically indicated.      ECOG SCORE               Discussion:   No follow-ups on file.    Plan was discussed with the patient at length, and she verbalized understanding. Katy was given an opportunity to ask questions that were answered to her satisfaction, and she was advised to call in the interval if " any problems or questions arise.    Electronically signed by Irene Soria MD      Route Chart for Scheduling    Med Onc Chart Routing      Follow up with physician 3 months. benign heme with same day cbc   Follow up with JANET    Infusion scheduling note    Injection scheduling note    Labs CBC   Scheduling:  Preferred lab:  Lab interval:     Imaging    Pharmacy appointment    Other referrals

## 2024-03-22 DIAGNOSIS — I10 ESSENTIAL HYPERTENSION: ICD-10-CM

## 2024-03-22 DIAGNOSIS — E78.5 HYPERLIPIDEMIA, UNSPECIFIED HYPERLIPIDEMIA TYPE: ICD-10-CM

## 2024-03-23 NOTE — TELEPHONE ENCOUNTER
No care due was identified.  [facility] Embedded Care Due Messages. Reference number: [id number].   3/22/2024 9:26:22 PM CDT

## 2024-03-24 RX ORDER — ATORVASTATIN CALCIUM 40 MG/1
TABLET, FILM COATED ORAL
Qty: 90 TABLET | Refills: 2 | Status: SHIPPED | OUTPATIENT
Start: 2024-03-24

## 2024-03-24 RX ORDER — TRAZODONE HYDROCHLORIDE 50 MG/1
TABLET ORAL
Qty: 90 TABLET | Refills: 3 | Status: SHIPPED | OUTPATIENT
Start: 2024-03-24

## 2024-03-25 RX ORDER — LOSARTAN POTASSIUM 100 MG/1
TABLET ORAL
Qty: 90 TABLET | Refills: 2 | Status: SHIPPED | OUTPATIENT
Start: 2024-03-25

## 2024-03-25 RX ORDER — HYDROCHLOROTHIAZIDE 12.5 MG/1
CAPSULE ORAL
Qty: 90 CAPSULE | Refills: 2 | Status: SHIPPED | OUTPATIENT
Start: 2024-03-25

## 2024-03-25 RX ORDER — AMLODIPINE BESYLATE 5 MG/1
TABLET ORAL
Qty: 90 TABLET | Refills: 3 | Status: SHIPPED | OUTPATIENT
Start: 2024-03-25

## 2024-03-25 NOTE — TELEPHONE ENCOUNTER
Refill Routing Note   Medication(s) are not appropriate for processing by [facility] for the following reason(s):        Required vitals abnormal    [facility] action(s):  Defer  Approve               Appointments  past 12m or future 3m with PCP    Date Provider   Last Visit   12/29/2023 Bill Garvey MD   Next Visit   5/29/2024 Bill Garvey MD   ED visits in past 90 days: 0        Note composed:10:04 PM 03/24/2024

## 2024-04-04 ENCOUNTER — TELEPHONE (OUTPATIENT)
Dept: PRIMARY CARE CLINIC | Facility: CLINIC | Age: 74
End: 2024-04-04

## 2024-04-04 ENCOUNTER — OFFICE VISIT (OUTPATIENT)
Dept: PRIMARY CARE CLINIC | Facility: CLINIC | Age: 74
End: 2024-04-04
Payer: MEDICARE

## 2024-04-04 VITALS
SYSTOLIC BLOOD PRESSURE: 138 MMHG | TEMPERATURE: 98 F | HEIGHT: 61 IN | RESPIRATION RATE: 18 BRPM | HEART RATE: 97 BPM | BODY MASS INDEX: 31.48 KG/M2 | OXYGEN SATURATION: 96 % | WEIGHT: 166.75 LBS | DIASTOLIC BLOOD PRESSURE: 80 MMHG

## 2024-04-04 DIAGNOSIS — E78.5 HYPERLIPIDEMIA, UNSPECIFIED HYPERLIPIDEMIA TYPE: Chronic | ICD-10-CM

## 2024-04-04 DIAGNOSIS — H91.93 DECREASED HEARING OF BOTH EARS: ICD-10-CM

## 2024-04-04 DIAGNOSIS — Z71.89 ACP (ADVANCE CARE PLANNING): ICD-10-CM

## 2024-04-04 DIAGNOSIS — I10 ESSENTIAL HYPERTENSION: Chronic | ICD-10-CM

## 2024-04-04 DIAGNOSIS — Z00.00 ENCOUNTER FOR PREVENTIVE HEALTH EXAMINATION: Primary | ICD-10-CM

## 2024-04-04 PROCEDURE — 3075F SYST BP GE 130 - 139MM HG: CPT | Mod: HCNC,CPTII,S$GLB, | Performed by: PHYSICIAN ASSISTANT

## 2024-04-04 PROCEDURE — 4010F ACE/ARB THERAPY RXD/TAKEN: CPT | Mod: HCNC,CPTII,S$GLB, | Performed by: PHYSICIAN ASSISTANT

## 2024-04-04 PROCEDURE — 1170F FXNL STATUS ASSESSED: CPT | Mod: HCNC,CPTII,S$GLB, | Performed by: PHYSICIAN ASSISTANT

## 2024-04-04 PROCEDURE — 1160F RVW MEDS BY RX/DR IN RCRD: CPT | Mod: HCNC,CPTII,S$GLB, | Performed by: PHYSICIAN ASSISTANT

## 2024-04-04 PROCEDURE — 99497 ADVNCD CARE PLAN 30 MIN: CPT | Mod: HCNC,S$GLB,, | Performed by: PHYSICIAN ASSISTANT

## 2024-04-04 PROCEDURE — 1123F ACP DISCUSS/DSCN MKR DOCD: CPT | Mod: HCNC,CPTII,S$GLB, | Performed by: PHYSICIAN ASSISTANT

## 2024-04-04 PROCEDURE — 99999 PR PBB SHADOW E&M-EST. PATIENT-LVL V: CPT | Mod: PBBFAC,HCNC,, | Performed by: PHYSICIAN ASSISTANT

## 2024-04-04 PROCEDURE — 1159F MED LIST DOCD IN RCRD: CPT | Mod: HCNC,CPTII,S$GLB, | Performed by: PHYSICIAN ASSISTANT

## 2024-04-04 PROCEDURE — 1101F PT FALLS ASSESS-DOCD LE1/YR: CPT | Mod: HCNC,CPTII,S$GLB, | Performed by: PHYSICIAN ASSISTANT

## 2024-04-04 PROCEDURE — 3288F FALL RISK ASSESSMENT DOCD: CPT | Mod: HCNC,CPTII,S$GLB, | Performed by: PHYSICIAN ASSISTANT

## 2024-04-04 PROCEDURE — 3079F DIAST BP 80-89 MM HG: CPT | Mod: HCNC,CPTII,S$GLB, | Performed by: PHYSICIAN ASSISTANT

## 2024-04-04 PROCEDURE — 1126F AMNT PAIN NOTED NONE PRSNT: CPT | Mod: HCNC,CPTII,S$GLB, | Performed by: PHYSICIAN ASSISTANT

## 2024-04-04 PROCEDURE — G0438 PPPS, INITIAL VISIT: HCPCS | Mod: HCNC,S$GLB,, | Performed by: PHYSICIAN ASSISTANT

## 2024-04-04 RX ORDER — AZELASTINE 1 MG/ML
1 SPRAY, METERED NASAL 2 TIMES DAILY
Qty: 30 ML | Refills: 2 | Status: SHIPPED | OUTPATIENT
Start: 2024-04-04 | End: 2025-04-04

## 2024-04-04 NOTE — PATIENT INSTRUCTIONS
Counseling and Referral of Other Preventative  (Italic type indicates deductible and co-insurance are waived)    Patient Name: Katy Yarbrough  Today's Date: 4/4/2024    Health Maintenance       Date Due Completion Date    Mammogram 05/24/2019 5/24/2018 (Declined)    Override on 5/24/2018: Declined    Override on 1/21/2015: Done (followed by her gynecologist by her report)    COVID-19 Vaccine (8 - 2023-24 season) 11/18/2023 9/23/2023    Colorectal Cancer Screening 01/21/2025 12/21/2023    Override on 1/21/2015: Declined (she refused even after extensive counseling)    DEXA Scan 03/08/2025 3/8/2022    Override on 5/24/2018: Done    Lipid Panel 12/22/2028 12/22/2023    TETANUS VACCINE 08/01/2033 8/1/2023    Override on 9/1/2012: Done (date approximate)        No orders of the defined types were placed in this encounter.    The following information is provided to all patients.  This information is to help you find resources for any of the problems found today that may be affecting your health:                  Living healthy guide: www.[facility].[place].gov      Understanding Diabetes: www.diabetes.org      Eating healthy: www.cdc.gov/healthyweight      CDC home safety checklist: www.cdc.gov/steadi/patient.html      Agency on Aging: www.goea.[place].[facility]      Alcoholics anonymous (AA): www.aa.org      Physical Activity: www.loren.nih.gov/nj8zdrc      Tobacco use: www.quitwithusla.org

## 2024-04-04 NOTE — PROGRESS NOTES
"  Katy Yarbrough presented for an initial Medicare AWV today. The following components were reviewed and updated:    Medical history  Family History  Social history  Allergies and Current Medications  Health Risk Assessment  Health Maintenance  Care Team    **See Completed Assessments for Annual Wellness visit with in the encounter summary    The following assessments were completed:  Depression Screening  Cognitive function Screening  Timed Get Up Test  Whisper Test        Opioid documentation:      Patient does not have a current opioid prescription.          Vitals:    04/04/24 0849 04/04/24 0931   BP: (!) 144/70 138/80   BP Location: Right arm    Patient Position: Sitting    BP Method: Medium (Manual)    Pulse: 97    Resp: 18    Temp: 97.6 °F (36.4 °C)    TempSrc: Oral    SpO2: 96%    Weight: 75.7 kg (166 lb 12.5 oz)    Height: 5' 1" (1.549 m)      Body mass index is 31.51 kg/m².       Physical Exam  Vitals and nursing note reviewed.   Constitutional:       General: She is not in acute distress.     Appearance: Normal appearance. She is not ill-appearing.   HENT:      Head: Normocephalic and atraumatic.      Right Ear: Tympanic membrane, ear canal and external ear normal.      Left Ear: Ear canal and external ear normal. A middle ear effusion (clear) is present.   Eyes:      General:         Right eye: No discharge.         Left eye: No discharge.      Extraocular Movements: Extraocular movements intact.      Conjunctiva/sclera: Conjunctivae normal.   Cardiovascular:      Rate and Rhythm: Normal rate and regular rhythm.      Heart sounds: Normal heart sounds. No murmur heard.  Pulmonary:      Effort: Pulmonary effort is normal. No respiratory distress.   Skin:     General: Skin is warm and dry.      Coloration: Skin is not jaundiced or pale.   Neurological:      General: No focal deficit present.      Mental Status: She is alert.   Psychiatric:         Mood and Affect: Mood normal.         Behavior: Behavior " normal.         Thought Content: Thought content normal.         Judgment: Judgment normal.           Diagnoses and health risks identified today and associated recommendations/orders:  1. Encounter for preventive health examination  AWV completed today  Up to date on vaccinations  Discussed need mammogram    2. Essential hypertension  Stable  Continue current medication regimen  Followed by PCP    3. Hyperlipidemia, unspecified hyperlipidemia type  Well controlled  Continue current medication regimen  Followed by PCP    4. Decreased hearing of both ears  Has had Audiology exam 2 years ago  Declines any follow up at this time  Will consider and discuss with PCP at next visit  Followed by Audiology, ENT and PCP    5. ACP (advance care planning)  Advance Care Planning     Date: 04/04/2024    Living Will  During this visit, I engaged the patient  in the voluntary advance care planning process.  The patient and I reviewed the role for advance directives and their purpose in directing future healthcare if the patient's unable to speak for him/herself.  At this point in time, the patient does have full decision-making capacity.  We discussed different extreme health states that she could experience, and reviewed what kind of medical care she would want in those situations.  The patient communicated that if she were comatose and had little chance of a meaningful recovery, she would not want machines/life-sustaining treatments used. In addition to the above preference, other important end-of-life issues for the patient include  n/a . The patient has completed a living will to reflect these preferences.  I spent a total of 10 minutes engaging the patient in this advance care planning discussion.    Advance Care Planning     Date: 04/04/2024    Power of   I initiated the process of voluntary advance care planning today and explained the importance of this process to the patient.  I introduced the concept of advance  directives to the patient, as well. Then the patient received detailed information about the importance of designating a Health Care Power of  (HCPOA). She was also instructed to communicate with this person about their wishes for future healthcare, should she become sick and lose decision-making capacity. The patient has not previously appointed a HCPOA. After our discussion, the patient has not decided to complete a HCPOA. I spent a total time of 10 minutes discussing this issue with the patient.     She will consider HCPOA and has asked and been provided with form to fill out once she decides.        I initiated the process voluntary advanced care planning for both healthcare power of  as well as living well.  Patient did complete documentation for living will.  Is considering healthcare power-of-.  I spent a total time 20 minutes combined discussing both documents as listed above separately.    Provided Katy with a 5-10 year written screening schedule and personal prevention plan. Recommendations were developed using the USPSTF age appropriate recommendations. Education, counseling, and referrals were provided as needed.  After Visit Summary printed and given to patient which includes a list of additional screenings\tests needed.    No follow-ups on file.      JANENE Alcantar  I offered to discuss advanced care planning, including how to pick a person who would make decisions for you if you were unable to make them for yourself, called a health care power of , and what kind of decisions you might make such as use of life sustaining treatments such as ventilators and tube feeding when faced with a life limiting illness recorded on a living will that they will need to know. (How you want to be cared for as you near the end of your natural life)     X Patient is interested in learning more about how to make advanced directives.  I provided them paperwork and offered to discuss  this with them.

## 2024-05-22 ENCOUNTER — LAB VISIT (OUTPATIENT)
Dept: LAB | Facility: HOSPITAL | Age: 74
End: 2024-05-22
Attending: FAMILY MEDICINE
Payer: MEDICARE

## 2024-05-22 DIAGNOSIS — E78.5 HYPERLIPIDEMIA, UNSPECIFIED HYPERLIPIDEMIA TYPE: Chronic | ICD-10-CM

## 2024-05-22 DIAGNOSIS — R71.8 ELEVATED HEMATOCRIT: ICD-10-CM

## 2024-05-22 DIAGNOSIS — I10 ESSENTIAL HYPERTENSION: Chronic | ICD-10-CM

## 2024-05-22 LAB
ALBUMIN SERPL BCP-MCNC: 3.5 G/DL (ref 3.5–5.2)
ALP SERPL-CCNC: 71 U/L (ref 55–135)
ALT SERPL W/O P-5'-P-CCNC: 14 U/L (ref 10–44)
ANION GAP SERPL CALC-SCNC: 7 MMOL/L (ref 8–16)
AST SERPL-CCNC: 16 U/L (ref 10–40)
BASOPHILS # BLD AUTO: 0.03 K/UL (ref 0–0.2)
BASOPHILS NFR BLD: 0.3 % (ref 0–1.9)
BILIRUB SERPL-MCNC: 0.5 MG/DL (ref 0.1–1)
BUN SERPL-MCNC: 17 MG/DL (ref 8–23)
CALCIUM SERPL-MCNC: 10 MG/DL (ref 8.7–10.5)
CHLORIDE SERPL-SCNC: 106 MMOL/L (ref 95–110)
CHOLEST SERPL-MCNC: 162 MG/DL (ref 120–199)
CHOLEST/HDLC SERPL: 2 {RATIO} (ref 2–5)
CO2 SERPL-SCNC: 30 MMOL/L (ref 23–29)
CREAT SERPL-MCNC: 0.9 MG/DL (ref 0.5–1.4)
DIFFERENTIAL METHOD BLD: ABNORMAL
EOSINOPHIL # BLD AUTO: 0.3 K/UL (ref 0–0.5)
EOSINOPHIL NFR BLD: 2.5 % (ref 0–8)
ERYTHROCYTE [DISTWIDTH] IN BLOOD BY AUTOMATED COUNT: 13 % (ref 11.5–14.5)
EST. GFR  (NO RACE VARIABLE): >60 ML/MIN/1.73 M^2
GLUCOSE SERPL-MCNC: 90 MG/DL (ref 70–110)
HCT VFR BLD AUTO: 48.7 % (ref 37–48.5)
HDLC SERPL-MCNC: 81 MG/DL (ref 40–75)
HDLC SERPL: 50 % (ref 20–50)
HGB BLD-MCNC: 15.4 G/DL (ref 12–16)
IMM GRANULOCYTES # BLD AUTO: 0.03 K/UL (ref 0–0.04)
IMM GRANULOCYTES NFR BLD AUTO: 0.3 % (ref 0–0.5)
LDLC SERPL CALC-MCNC: 68.4 MG/DL (ref 63–159)
LYMPHOCYTES # BLD AUTO: 2 K/UL (ref 1–4.8)
LYMPHOCYTES NFR BLD: 18.5 % (ref 18–48)
MCH RBC QN AUTO: 30.7 PG (ref 27–31)
MCHC RBC AUTO-ENTMCNC: 31.6 G/DL (ref 32–36)
MCV RBC AUTO: 97 FL (ref 82–98)
MONOCYTES # BLD AUTO: 0.8 K/UL (ref 0.3–1)
MONOCYTES NFR BLD: 7.2 % (ref 4–15)
NEUTROPHILS # BLD AUTO: 7.8 K/UL (ref 1.8–7.7)
NEUTROPHILS NFR BLD: 71.2 % (ref 38–73)
NONHDLC SERPL-MCNC: 81 MG/DL
NRBC BLD-RTO: 0 /100 WBC
PLATELET # BLD AUTO: 233 K/UL (ref 150–450)
PMV BLD AUTO: 11.2 FL (ref 9.2–12.9)
POTASSIUM SERPL-SCNC: 3.9 MMOL/L (ref 3.5–5.1)
PROT SERPL-MCNC: 7.1 G/DL (ref 6–8.4)
RBC # BLD AUTO: 5.02 M/UL (ref 4–5.4)
SODIUM SERPL-SCNC: 143 MMOL/L (ref 136–145)
TRIGL SERPL-MCNC: 63 MG/DL (ref 30–150)
WBC # BLD AUTO: 10.89 K/UL (ref 3.9–12.7)

## 2024-05-22 PROCEDURE — 80053 COMPREHEN METABOLIC PANEL: CPT | Mod: HCNC | Performed by: FAMILY MEDICINE

## 2024-05-22 PROCEDURE — 85025 COMPLETE CBC W/AUTO DIFF WBC: CPT | Mod: HCNC | Performed by: FAMILY MEDICINE

## 2024-05-22 PROCEDURE — 36415 COLL VENOUS BLD VENIPUNCTURE: CPT | Mod: HCNC,PN | Performed by: FAMILY MEDICINE

## 2024-05-22 PROCEDURE — 80061 LIPID PANEL: CPT | Mod: HCNC | Performed by: FAMILY MEDICINE

## 2024-05-23 ENCOUNTER — OFFICE VISIT (OUTPATIENT)
Dept: DERMATOLOGY | Facility: CLINIC | Age: 74
End: 2024-05-23
Payer: MEDICARE

## 2024-05-23 DIAGNOSIS — L57.0 AK (ACTINIC KERATOSIS): ICD-10-CM

## 2024-05-23 DIAGNOSIS — L82.1 SK (SEBORRHEIC KERATOSIS): ICD-10-CM

## 2024-05-23 DIAGNOSIS — L81.4 LENTIGINES: Primary | ICD-10-CM

## 2024-05-23 PROCEDURE — 1101F PT FALLS ASSESS-DOCD LE1/YR: CPT | Mod: HCNC,CPTII,S$GLB, | Performed by: DERMATOLOGY

## 2024-05-23 PROCEDURE — 99999 PR PBB SHADOW E&M-EST. PATIENT-LVL II: CPT | Mod: PBBFAC,HCNC,, | Performed by: DERMATOLOGY

## 2024-05-23 PROCEDURE — 1159F MED LIST DOCD IN RCRD: CPT | Mod: HCNC,CPTII,S$GLB, | Performed by: DERMATOLOGY

## 2024-05-23 PROCEDURE — 3288F FALL RISK ASSESSMENT DOCD: CPT | Mod: HCNC,CPTII,S$GLB, | Performed by: DERMATOLOGY

## 2024-05-23 PROCEDURE — 99213 OFFICE O/P EST LOW 20 MIN: CPT | Mod: 25,HCNC,S$GLB, | Performed by: DERMATOLOGY

## 2024-05-23 PROCEDURE — 4010F ACE/ARB THERAPY RXD/TAKEN: CPT | Mod: HCNC,CPTII,S$GLB, | Performed by: DERMATOLOGY

## 2024-05-23 PROCEDURE — 17000 DESTRUCT PREMALG LESION: CPT | Mod: HCNC,S$GLB,, | Performed by: DERMATOLOGY

## 2024-05-23 PROCEDURE — 17003 DESTRUCT PREMALG LES 2-14: CPT | Mod: HCNC,S$GLB,, | Performed by: DERMATOLOGY

## 2024-05-23 NOTE — PROGRESS NOTES
Subjective:       Patient ID:  Katy Yarbrough is a 73 y.o. female who presents for   Chief Complaint   Patient presents with    Spot     On nose      HPI  Established patient.  Here to recheck SK at nose. Prior cryotherapy with recurrence. Also c/o other lesions at face.   No further complaints today.   No personal or known family hx skin cancer.        Review of Systems     Objective:    Physical Exam   Constitutional: She appears well-developed and well-nourished. No distress.   Neurological: She is alert and oriented to person, place, and time. She is not disoriented.   Psychiatric: She has a normal mood and affect.   Skin:   Areas Examined (abnormalities noted in diagram):   Head / Face Inspection Performed              Diagram Legend     Erythematous scaling macule/papule c/w actinic keratosis       Vascular papule c/w angioma      Pigmented verrucoid papule/plaque c/w seborrheic keratosis      Yellow umbilicated papule c/w sebaceous hyperplasia      Irregularly shaped tan macule c/w lentigo     1-2 mm smooth white papules consistent with Milia      Movable subcutaneous cyst with punctum c/w epidermal inclusion cyst      Subcutaneous movable cyst c/w pilar cyst      Firm pink to brown papule c/w dermatofibroma      Pedunculated fleshy papule(s) c/w skin tag(s)      Evenly pigmented macule c/w junctional nevus     Mildly variegated pigmented, slightly irregular-bordered macule c/w mildly atypical nevus      Flesh colored to evenly pigmented papule c/w intradermal nevus       Pink pearly papule/plaque c/w basal cell carcinoma      Erythematous hyperkeratotic cursted plaque c/w SCC      Surgical scar with no sign of skin cancer recurrence      Open and closed comedones      Inflammatory papules and pustules      Verrucoid papule consistent consistent with wart     Erythematous eczematous patches and plaques     Dystrophic onycholytic nail with subungual debris c/w onychomycosis     Umbilicated papule     Erythematous-base heme-crusted tan verrucoid plaque consistent with inflamed seborrheic keratosis     Erythematous Silvery Scaling Plaque c/w Psoriasis     See annotation      Assessment / Plan:      AK (actinic keratosis)  - Discussed diagnosis, etiology, and precancerous nature of condition.   - Cryosurgery Procedure Note: Discussed procedure with patient/patient's guardian including risks and benefits as well as treatment alternatives. Risks of procedure include pain, itching, swelling, redness, blistering, crusting, wound formation, post-inflammatory pigmentary alteration, scar, recurrence. Verbal consent obtained. LN2 cryosurgery performed to 7 lesion(s). Patient tolerated procedure well. After-visit wound care instructions reviewed and provided in writing.      Lentigines  - Benign; reassured treatment not necessary.   - Recommended daily sun protection, including the use of OTC broad-spectrum sunscreen (SPF 30 or greater) and sun-protective clothing.      SK (seborrheic keratosis)  - Benign; reassured treatment not necessary.   - Repeat LN2 offered to focal recurrence at nose.          Follow up for skin check, sooner PRN.

## 2024-05-24 NOTE — PATIENT INSTRUCTIONS
CRYOSURGERY      Your doctor has used a method called cryosurgery to treat your skin condition. Cryosurgery refers to the use of very cold substances to treat a variety of skin conditions such as warts, pre-skin cancers, molluscum contagiosum, sun spots, and several benign growths. The substance we use in cryosurgery is liquid nitrogen and is so cold (-195 degrees Celsius) that is burns when administered.     Following treatment in the office, the skin may immediately burn and become red. You may find the area around the lesion is affected as well. It is sometimes necessary to treat not only the lesion, but a small area of the surrounding normal skin to achieve a good response.     A blister, and even a blood filled blister, may form after treatment.   This is a normal response. If the blister is painful, it is acceptable to sterilize a needle and with rubbing alcohol and gently pop the blister. It is important that you gently wash the area with soap and warm water as the blister fluid may contain wart virus if a wart was treated. Do no remove the roof of the blister.     The area treated can take anywhere from 1-3 weeks to heal. Healing time depends on the kind skin lesion treated, the location, and how aggressively the lesion was treated. It is recommended that the areas treated are covered with Vaseline or bacitracin ointment and a band-aid. If a band-aid is not practical, just ointment applied several times per day will do. Keeping these areas moist will speed the healing time.    Treatment with liquid nitrogen can leave a scar. In dark skin, it may be a light or dark scar, in light skin it may be a white or pink scar. These will generally fade with time, but may never go away completely.     If you have any concerns after your treatment, please feel free to call the office.       [address] [facility] [place] [address]/ [phone] FAX/ [email or web address] What Are the Symptoms of Skin  Cancer?  A change in your skin is the most common sign of skin cancer. This could be a new growth, a sore that doesnt heal, or a change in a mole. Not all skin cancers look the same.    For melanoma specifically, a simple way to remember the warning signs is to remember the A-B-C-D-Es of melanoma--    A stands for asymmetrical. Does the mole or spot have an irregular shape with two parts that look very different?  B stands for border. Is the border irregular or jagged?  C is for color. Is the color uneven?  D is for diameter. Is the mole or spot larger than the size of a pea?  E is for evolving. Has the mole or spot changed during the past few weeks or months?    Talk to your doctor if you notice changes in your skin such as a new growth, a sore that doesnt heal, a change in an old growth, or any of the A-B-C-D-Es of melanoma    What Can I Do to Reduce My Risk of Skin Cancer?  Protection from ultraviolet (UV) radiation is important all year, not just during the summer or at the beach. UV rays from the sun can reach you on cloudy and hazy days, not just on bright and ame days. UV rays also reflect off of surfaces like water, cement, sand, and snow. Indoor tanning (using a tanning bed, garcia, or sunlamp to get tan) exposes users to UV radiation.    The hours between 10 a.m. and 4 p.m. Daylight Saving Time (9 a.m. to 3 p.m. standard time) are the most hazardous for UV exposure outdoors in the continental [place]. UV rays from sunlight are the greatest during the late spring and early summer in [place].    CDC recommends easy options for protection from UV radiation--    Stay in the shade or indoors, especially during midday hours.  Wear clothing that covers your arms and legs.  Wear a hat with a wide brim to shade your face, head, ears, and neck.  Wear sunglasses that wrap around and block both UVA and UVB rays.  Use sunscreen with a sun protection factor (SPF) of 30 or higher, and both UVA  and UVB (broad spectrum) protection.  Avoid indoor tanning.    Adapted from https://www.cdc.gov/cancer/skin/basic_info/

## 2024-05-29 ENCOUNTER — OFFICE VISIT (OUTPATIENT)
Dept: PRIMARY CARE CLINIC | Facility: CLINIC | Age: 74
End: 2024-05-29
Payer: MEDICARE

## 2024-05-29 VITALS
HEART RATE: 72 BPM | BODY MASS INDEX: 32.02 KG/M2 | WEIGHT: 169.63 LBS | OXYGEN SATURATION: 97 % | TEMPERATURE: 98 F | DIASTOLIC BLOOD PRESSURE: 84 MMHG | HEIGHT: 61 IN | SYSTOLIC BLOOD PRESSURE: 132 MMHG

## 2024-05-29 DIAGNOSIS — I10 ESSENTIAL HYPERTENSION: Primary | Chronic | ICD-10-CM

## 2024-05-29 DIAGNOSIS — R71.8 ELEVATED HEMATOCRIT: ICD-10-CM

## 2024-05-29 DIAGNOSIS — Z12.31 ENCOUNTER FOR SCREENING MAMMOGRAM FOR MALIGNANT NEOPLASM OF BREAST: ICD-10-CM

## 2024-05-29 DIAGNOSIS — E78.5 HYPERLIPIDEMIA, UNSPECIFIED HYPERLIPIDEMIA TYPE: Chronic | ICD-10-CM

## 2024-05-29 PROCEDURE — 1101F PT FALLS ASSESS-DOCD LE1/YR: CPT | Mod: HCNC,CPTII,S$GLB, | Performed by: FAMILY MEDICINE

## 2024-05-29 PROCEDURE — 3079F DIAST BP 80-89 MM HG: CPT | Mod: HCNC,CPTII,S$GLB, | Performed by: FAMILY MEDICINE

## 2024-05-29 PROCEDURE — 3288F FALL RISK ASSESSMENT DOCD: CPT | Mod: HCNC,CPTII,S$GLB, | Performed by: FAMILY MEDICINE

## 2024-05-29 PROCEDURE — 1126F AMNT PAIN NOTED NONE PRSNT: CPT | Mod: HCNC,CPTII,S$GLB, | Performed by: FAMILY MEDICINE

## 2024-05-29 PROCEDURE — 99999 PR PBB SHADOW E&M-EST. PATIENT-LVL IV: CPT | Mod: PBBFAC,HCNC,, | Performed by: FAMILY MEDICINE

## 2024-05-29 PROCEDURE — 4010F ACE/ARB THERAPY RXD/TAKEN: CPT | Mod: HCNC,CPTII,S$GLB, | Performed by: FAMILY MEDICINE

## 2024-05-29 PROCEDURE — 99214 OFFICE O/P EST MOD 30 MIN: CPT | Mod: HCNC,S$GLB,, | Performed by: FAMILY MEDICINE

## 2024-05-29 PROCEDURE — 3075F SYST BP GE 130 - 139MM HG: CPT | Mod: HCNC,CPTII,S$GLB, | Performed by: FAMILY MEDICINE

## 2024-05-29 PROCEDURE — 3008F BODY MASS INDEX DOCD: CPT | Mod: HCNC,CPTII,S$GLB, | Performed by: FAMILY MEDICINE

## 2024-05-29 PROCEDURE — 1159F MED LIST DOCD IN RCRD: CPT | Mod: HCNC,CPTII,S$GLB, | Performed by: FAMILY MEDICINE

## 2024-05-29 PROCEDURE — 1160F RVW MEDS BY RX/DR IN RCRD: CPT | Mod: HCNC,CPTII,S$GLB, | Performed by: FAMILY MEDICINE

## 2024-05-29 NOTE — ASSESSMENT & PLAN NOTE
This is a relatively stable.  She continues to follow with Hematology-Oncology and has an appointment next month.

## 2024-05-29 NOTE — PROGRESS NOTES
Primary Care Provider Appointment   [facility]       Patient ID: Katy Yarbrough is a 73 y.o. female.    ASSESSMENT/PLAN by Problem List:    1. Essential hypertension  Assessment & Plan:  Stable and well controlled.  Continue current BP medications  Amlodipine 5 mg daily  Losartan 100 mg daily  HCTZ 12.5 mg daily    Orders:  -     Comprehensive Metabolic Panel; Future; Expected date: 05/29/2024  -     Lipid Panel; Future; Expected date: 05/29/2024    2. Hyperlipidemia, unspecified hyperlipidemia type  Assessment & Plan:  Mild improvement.  LDL is 68 which is within target based on her risk.  Continue atorvastatin 40 mg daily along with healthy nutrition and lifestyle.    Orders:  -     Comprehensive Metabolic Panel; Future; Expected date: 05/29/2024  -     Lipid Panel; Future; Expected date: 05/29/2024    3. Elevated hematocrit  Assessment & Plan:  This is a relatively stable.  She continues to follow with Hematology-Oncology and has an appointment next month.      4. Encounter for screening mammogram for malignant neoplasm of breast  Assessment & Plan:  Discussed breast cancer screening again, recommended mammography again.  She continues to decline.  She denies any palpable lumps or visible changes.           Follow Up:  5-6 months    Subjective:     Chief Complaint   Patient presents with    Follow-up     I have reviewed the information entered by the ancillary staff regarding the chief complaint as well as the related history.    HPI    Patient is a/an 73 y.o.  female     Routine follow-up.  She is doing well and reports no acute changes or concerns.  She does have some questions regarding frequency of her blood work and recent hematology consultation.  See above for all details addressed today.    For complete problem list, past medical history, surgical history, social history, etc., see appropriate section in the electronic medical record    Review of Systems   HENT:  "Negative.     Respiratory: Negative.  Negative for shortness of breath and wheezing.    Cardiovascular: Negative.  Negative for chest pain.   Gastrointestinal: Negative.    Genitourinary: Negative.    Musculoskeletal: Negative.    Skin: Negative.        Objective     Physical Exam  Vitals reviewed.   Constitutional:       General: She is not in acute distress.     Appearance: She is well-developed. She is not ill-appearing.   HENT:      Head: Normocephalic and atraumatic.   Eyes:      General: No scleral icterus.     Conjunctiva/sclera: Conjunctivae normal.   Cardiovascular:      Rate and Rhythm: Normal rate and regular rhythm.      Heart sounds: Normal heart sounds. No murmur heard.  Pulmonary:      Effort: Pulmonary effort is normal. No respiratory distress.      Breath sounds: Normal breath sounds. No wheezing or rales.   Skin:     General: Skin is dry.      Findings: No rash.   Neurological:      Mental Status: She is alert and oriented to person, place, and time.   Psychiatric:         Behavior: Behavior normal.       Vitals:    05/29/24 0940   BP: 132/84   BP Location: Left arm   Patient Position: Sitting   BP Method: Medium (Manual)   Pulse: 72   Temp: 98.4 °F (36.9 °C)   TempSrc: Oral   SpO2: 97%   Weight: 76.9 kg (169 lb 10.3 oz)   Height: 5' 1" (1.549 m)           THIS DOCUMENT WAS MADE IN PART WITH VOICE RECOGNITION SOFTWARE.  OCCASIONALLY THIS SOFTWARE WILL MISINTERPRET WORDS OR PHRASES.    "

## 2024-05-29 NOTE — ASSESSMENT & PLAN NOTE
Stable and well controlled.  Continue current BP medications  Amlodipine 5 mg daily  Losartan 100 mg daily  HCTZ 12.5 mg daily

## 2024-05-29 NOTE — ASSESSMENT & PLAN NOTE
Mild improvement.  LDL is 68 which is within target based on her risk.  Continue atorvastatin 40 mg daily along with healthy nutrition and lifestyle.

## 2024-05-29 NOTE — ASSESSMENT & PLAN NOTE
Discussed breast cancer screening again, recommended mammography again.  She continues to decline.  She denies any palpable lumps or visible changes.

## 2024-06-27 ENCOUNTER — TELEPHONE (OUTPATIENT)
Dept: HEMATOLOGY/ONCOLOGY | Facility: CLINIC | Age: 74
End: 2024-06-27
Payer: MEDICARE

## 2024-06-27 DIAGNOSIS — R71.8 ELEVATED HEMATOCRIT: Primary | ICD-10-CM

## 2024-06-27 DIAGNOSIS — D45 POLYCYTHEMIA VERA: ICD-10-CM

## 2024-06-27 NOTE — TELEPHONE ENCOUNTER
Pt requested reschedule with Dr Goodman, appt scheduled for 9/13, with labs prior, also added pt to wait list.  Pt verbalized understanding.   ----- Message from Deidra Jacobs sent at 6/27/2024  8:56 AM CDT -----  Type: Needs Medical Advice  Who Called:  Patient   Symptoms (please be specific):    How long has patient had these symptoms:    Pharmacy name and phone #:    Best Call Back Number: [phone]  Additional Information: Patient is requesting a call back to reschedule her appt. with labs.

## 2024-09-12 NOTE — PROGRESS NOTES
Subjective     Patient ID: Katy Yarbrough is a 74 y.o. female.    Chief Complaint: No chief complaint on file.    HPI    Mrs Yarbrough returns today for follow-up.  This is the 1st time that I am seeing her.  Briefly, she is a 74-year-old female who had previously seen by Dr. Soria in March of the year for erythrocytosis.  I have reviewed Dr. Soria's note.  Her workup was as follows:  There was no evidence of a JAK2 mutation  EPO level was not inappropriately low  Her CBC today shows a white count of 7800 per cubic mm, hemoglobin 15 grams/deciliter, hematocrit 45.1%, MCV 92 and platelets 219 K.      Review of Systems  Overall she feels well.  When asked whether she snores she states that she sleeps by herself but occasionally has woken herself up from her snoring.  Her ECOG PS is 1.    She denies any anxiety, depression, easy bruising, fevers, chills, night  sweats, weight loss, nausea, vomiting, diarrhea, constipation, diplopia,   blurred vision, headache, chest pain, palpitations, shortness of breath, breast pain, abdominal pain, extremity pain, or difficulty ambulating.  The remainder of the ten-point ROS, including general, skin, lymph, H/N, cardiorespiratory, GI, , Neuro, Endocrine, and psychiatric is negative.     Physical Exam       She is alert, oriented to time, place, person, pleasant, well      nourished, in no acute physical distress.                                    VITAL SIGNS:  Reviewed                                      HEENT:  Normal.  There are no nasal, oral, lip, gingival, auricular, lid,    or conjunctival lesions.  Mucosae are moist and pink, and there is no        thrush.  Pupils are equal, reactive to light and accommodation.              Extraocular muscle movements are intact.  Dentition is good.  There is no frontal or maxillary tenderness.                                     NECK:  Supple without JVD, adenopathy, or thyromegaly.                       LUNGS:  Clear to auscultation  without wheezing, rales, or rhonchi.           CARDIOVASCULAR:  Reveals an S1, S2, no murmurs, no rubs, no gallops.         ABDOMEN:  Soft, nontender, without organomegaly.  Bowel sounds are    present.                                                                     EXTREMITIES:  No cyanosis, clubbing, or edema.                               BREASTS:  Deferred                                     LYMPHATIC:  There is no cervical, axillary, or supraclavicular adenopathy.   SKIN:  Warm and moist, without petechiae, rashes, induration, or ecchymoses.           NEUROLOGIC:  DTRs are 0-1+ bilaterally, symmetrical, motor function is 5/5,  and cranial nerves are  within normal limits.    ASSESSMENT  Secondary erythrocytosis.  P vera has been effectively ruled out.  Possible sleep apnea    PLAN  I had a long discussion with her.  We went over the differential for erythrocytosis.  I explained to her that she does not have a P vera in no treatment is indicated.  I will see her again in 6 months and if her H&H is stable, she will be referred back to her primary care physician.  Her multiple questions were answered to her satisfaction.  I spent approximately 20 minutes reviewing the available records and an additional 10 minutes evaluating the patient, in counseling and coordinating her care.

## 2024-09-13 ENCOUNTER — LAB VISIT (OUTPATIENT)
Dept: LAB | Facility: HOSPITAL | Age: 74
End: 2024-09-13
Attending: FAMILY MEDICINE
Payer: MEDICARE

## 2024-09-13 ENCOUNTER — OFFICE VISIT (OUTPATIENT)
Dept: HEMATOLOGY/ONCOLOGY | Facility: CLINIC | Age: 74
End: 2024-09-13
Payer: MEDICARE

## 2024-09-13 VITALS
HEIGHT: 61 IN | DIASTOLIC BLOOD PRESSURE: 82 MMHG | HEART RATE: 88 BPM | WEIGHT: 174.63 LBS | BODY MASS INDEX: 32.97 KG/M2 | SYSTOLIC BLOOD PRESSURE: 157 MMHG

## 2024-09-13 DIAGNOSIS — D45 POLYCYTHEMIA VERA: ICD-10-CM

## 2024-09-13 DIAGNOSIS — R71.8 ELEVATED HEMATOCRIT: ICD-10-CM

## 2024-09-13 DIAGNOSIS — R71.8 ELEVATED HEMATOCRIT: Primary | ICD-10-CM

## 2024-09-13 LAB
BASOPHILS # BLD AUTO: 0.03 K/UL (ref 0–0.2)
BASOPHILS NFR BLD: 0.4 % (ref 0–1.9)
DIFFERENTIAL METHOD BLD: NORMAL
EOSINOPHIL # BLD AUTO: 0.3 K/UL (ref 0–0.5)
EOSINOPHIL NFR BLD: 3.6 % (ref 0–8)
ERYTHROCYTE [DISTWIDTH] IN BLOOD BY AUTOMATED COUNT: 12.8 % (ref 11.5–14.5)
HCT VFR BLD AUTO: 45.1 % (ref 37–48.5)
HGB BLD-MCNC: 15 G/DL (ref 12–16)
IMM GRANULOCYTES # BLD AUTO: 0.01 K/UL (ref 0–0.04)
IMM GRANULOCYTES NFR BLD AUTO: 0.1 % (ref 0–0.5)
LYMPHOCYTES # BLD AUTO: 1.8 K/UL (ref 1–4.8)
LYMPHOCYTES NFR BLD: 23.2 % (ref 18–48)
MCH RBC QN AUTO: 30.7 PG (ref 27–31)
MCHC RBC AUTO-ENTMCNC: 33.3 G/DL (ref 32–36)
MCV RBC AUTO: 92 FL (ref 82–98)
MONOCYTES # BLD AUTO: 0.5 K/UL (ref 0.3–1)
MONOCYTES NFR BLD: 6 % (ref 4–15)
NEUTROPHILS # BLD AUTO: 5.2 K/UL (ref 1.8–7.7)
NEUTROPHILS NFR BLD: 66.7 % (ref 38–73)
NRBC BLD-RTO: 0 /100 WBC
PLATELET # BLD AUTO: 219 K/UL (ref 150–450)
PMV BLD AUTO: 10.2 FL (ref 9.2–12.9)
RBC # BLD AUTO: 4.89 M/UL (ref 4–5.4)
WBC # BLD AUTO: 7.84 K/UL (ref 3.9–12.7)

## 2024-09-13 PROCEDURE — 36415 COLL VENOUS BLD VENIPUNCTURE: CPT | Mod: HCNC,PN | Performed by: INTERNAL MEDICINE

## 2024-09-13 PROCEDURE — 99999 PR PBB SHADOW E&M-EST. PATIENT-LVL III: CPT | Mod: PBBFAC,HCNC,, | Performed by: INTERNAL MEDICINE

## 2024-09-13 PROCEDURE — 85025 COMPLETE CBC W/AUTO DIFF WBC: CPT | Mod: HCNC,PN | Performed by: INTERNAL MEDICINE

## 2024-10-02 ENCOUNTER — OFFICE VISIT (OUTPATIENT)
Facility: CLINIC | Age: 74
End: 2024-10-02
Payer: MEDICARE

## 2024-10-02 DIAGNOSIS — L82.1 SK (SEBORRHEIC KERATOSIS): ICD-10-CM

## 2024-10-02 DIAGNOSIS — L91.8 ACROCHORDON: ICD-10-CM

## 2024-10-02 DIAGNOSIS — L57.0 AK (ACTINIC KERATOSIS): ICD-10-CM

## 2024-10-02 DIAGNOSIS — L81.4 LENTIGINES: ICD-10-CM

## 2024-10-02 DIAGNOSIS — D18.01 CHERRY ANGIOMA: ICD-10-CM

## 2024-10-02 DIAGNOSIS — D22.9 MULTIPLE BENIGN NEVI: ICD-10-CM

## 2024-10-02 DIAGNOSIS — L57.8 ACTINIC SKIN DAMAGE: Primary | ICD-10-CM

## 2024-10-02 DIAGNOSIS — L82.0 INFLAMED SEBORRHEIC KERATOSIS: ICD-10-CM

## 2024-10-02 DIAGNOSIS — Z12.83 SCREENING FOR SKIN CANCER: ICD-10-CM

## 2024-10-02 DIAGNOSIS — D48.5 NEOPLASM OF UNCERTAIN BEHAVIOR OF SKIN: ICD-10-CM

## 2024-10-02 PROCEDURE — 17000 DESTRUCT PREMALG LESION: CPT | Mod: HCNC,XS,S$GLB, | Performed by: DERMATOLOGY

## 2024-10-02 PROCEDURE — 88342 IMHCHEM/IMCYTCHM 1ST ANTB: CPT | Mod: HCNC | Performed by: PATHOLOGY

## 2024-10-02 PROCEDURE — 99214 OFFICE O/P EST MOD 30 MIN: CPT | Mod: 25,HCNC,S$GLB, | Performed by: DERMATOLOGY

## 2024-10-02 PROCEDURE — 11103 TANGNTL BX SKIN EA SEP/ADDL: CPT | Mod: HCNC,XS,S$GLB, | Performed by: DERMATOLOGY

## 2024-10-02 PROCEDURE — 88305 TISSUE EXAM BY PATHOLOGIST: CPT | Mod: 26,HCNC,, | Performed by: PATHOLOGY

## 2024-10-02 PROCEDURE — 17110 DESTRUCTION B9 LES UP TO 14: CPT | Mod: HCNC,S$GLB,, | Performed by: DERMATOLOGY

## 2024-10-02 PROCEDURE — 17003 DESTRUCT PREMALG LES 2-14: CPT | Mod: HCNC,XS,S$GLB, | Performed by: DERMATOLOGY

## 2024-10-02 PROCEDURE — 88305 TISSUE EXAM BY PATHOLOGIST: CPT | Mod: HCNC | Performed by: PATHOLOGY

## 2024-10-02 PROCEDURE — 4010F ACE/ARB THERAPY RXD/TAKEN: CPT | Mod: HCNC,CPTII,S$GLB, | Performed by: DERMATOLOGY

## 2024-10-02 PROCEDURE — 88342 IMHCHEM/IMCYTCHM 1ST ANTB: CPT | Mod: 26,HCNC,, | Performed by: PATHOLOGY

## 2024-10-02 PROCEDURE — 11102 TANGNTL BX SKIN SINGLE LES: CPT | Mod: HCNC,XS,S$GLB, | Performed by: DERMATOLOGY

## 2024-10-02 PROCEDURE — 99999 PR PBB SHADOW E&M-EST. PATIENT-LVL III: CPT | Mod: PBBFAC,HCNC,, | Performed by: DERMATOLOGY

## 2024-10-02 PROCEDURE — 88341 IMHCHEM/IMCYTCHM EA ADD ANTB: CPT | Mod: HCNC | Performed by: PATHOLOGY

## 2024-10-02 PROCEDURE — 1159F MED LIST DOCD IN RCRD: CPT | Mod: HCNC,CPTII,S$GLB, | Performed by: DERMATOLOGY

## 2024-10-02 PROCEDURE — 88341 IMHCHEM/IMCYTCHM EA ADD ANTB: CPT | Mod: 26,HCNC,, | Performed by: PATHOLOGY

## 2024-10-02 RX ORDER — FLUOROURACIL 50 MG/G
CREAM TOPICAL
Qty: 40 G | Refills: 1 | Status: SHIPPED | OUTPATIENT
Start: 2024-10-02

## 2024-10-02 NOTE — PROGRESS NOTES
Subjective:       Patient ID:  Katy Yarbrough is a 74 y.o. female who presents for   Chief Complaint   Patient presents with    Skin Check     Patient is here today for a skin ck     HPI    Established patient.  Here today for total body skin exam.   +AK. No personal or known family hx skin cancer.      +AK   cryotherapy      Review of Systems     Objective:    Physical Exam   Constitutional: She appears well-developed and well-nourished. She is cooperative. No distress.   HENT:   Head: Normocephalic and atraumatic.   Eyes: Lids are normal. Lids are normal.  Right conjunctiva is not injected. Left conjunctiva is not injected. No conjunctival no injection.   Pulmonary/Chest: No respiratory distress.   Musculoskeletal:      Right lower leg: No edema.      Left lower leg: No edema.   Neurological: She is alert and oriented to person, place, and time. She is not disoriented.   Psychiatric: She has a normal mood and affect. Her speech is normal and behavior is normal. Mood, affect, judgment and thought content normal.   Skin:   Areas Examined (abnormalities noted in diagram):   Scalp / Hair Palpated and Inspected  Head / Face Inspection Performed  Neck Inspection Performed  Chest / Axilla Inspection Performed  Abdomen Inspection Performed  Genitals / Buttocks / Groin Inspection Performed  Back Inspection Performed  RUE Inspected  LUE Inspection Performed  RLE Inspected  LLE Inspection Performed  Nails and Digits Inspection Performed                   Diagram Legend     Erythematous scaling macule/papule c/w actinic keratosis       Vascular papule c/w angioma      Pigmented verrucoid papule/plaque c/w seborrheic keratosis      Yellow umbilicated papule c/w sebaceous hyperplasia      Irregularly shaped tan macule c/w lentigo     1-2 mm smooth white papules consistent with Milia      Movable subcutaneous cyst with punctum c/w epidermal inclusion cyst      Subcutaneous movable cyst c/w pilar cyst      Firm pink to brown  papule c/w dermatofibroma      Pedunculated fleshy papule(s) c/w skin tag(s)      Evenly pigmented macule c/w junctional nevus     Mildly variegated pigmented, slightly irregular-bordered macule c/w mildly atypical nevus      Flesh colored to evenly pigmented papule c/w intradermal nevus       Pink pearly papule/plaque c/w basal cell carcinoma      Erythematous hyperkeratotic cursted plaque c/w SCC      Surgical scar with no sign of skin cancer recurrence      Open and closed comedones      Inflammatory papules and pustules      Verrucoid papule consistent consistent with wart     Erythematous eczematous patches and plaques     Dystrophic onycholytic nail with subungual debris c/w onychomycosis     Umbilicated papule    Erythematous-base heme-crusted tan verrucoid plaque consistent with inflamed seborrheic keratosis     Erythematous Silvery Scaling Plaque c/w Psoriasis     See annotation              Assessment / Plan:      Pathology Orders:       Normal Orders This Visit    Specimen to Pathology, Dermatology     Comments:    Number of Specimens:->2  ------------------------->-------------------------  Spec 1 Procedure:->Biopsy  Spec 1 Clinical Impression:->ak r/o bcc vs hypomelanotic mm  Spec 1 Source:->right wrist  ------------------------->-------------------------  Spec 2 Procedure:->Biopsy  Spec 2 Clinical Impression:->nevus r/o atypia  Spec 2 Source:->left chest  Release to patient->Immediate    Questions:    Procedure Type: Dermatology and skin neoplasms    Number of Specimens: 2    ------------------------: -------------------------    Spec 1 Procedure: Biopsy    Spec 1 Clinical Impression: ak r/o bcc vs hypomelanotic mm    Spec 1 Source: right wrist    ------------------------: -------------------------    Spec 2 Procedure: Biopsy    Spec 2 Clinical Impression: nevus r/o atypia    Spec 2 Source: left chest    Release to patient: Immediate          Neoplasm of uncertain behavior of skin  -     Specimen to  Pathology, Dermatology  - Discussed diagnosis with patient and explained uncertain nature of condition, including ddx.   - Discussed treatment options (biopsy, close monitoring) with patient, including the risks and benefits of each. Patient opted to pursue biopsy.  - Shave Biopsy Procedure Note: Discussed procedure with patient/patient's guardian including risks and benefits as well as treatment alternatives. Risks of procedure include pain, bleeding, infection, post-inflammatory pigmentary alteration, scar, recurrence. Patient informed that the purpose of a biopsy is sampling of condition in question rather than removal in entirety; further treatment may be necessary. Verbal consent obtained. Area to be biopsied marked and cleansed with alcohol. Local anesthesia achieved by injecting approximately 1 cc of 1% lidocaine with epinephrine. Two shave biopsies performed using a double edge razor blade; specimens submitted to pathology. Hemostasis achieved with aluminum chloride. Petroleum jelly and bandage applied to wounds. Patient tolerated procedures well. After-visit wound care instructions reviewed and provided in writing.     AK (actinic keratosis)  - Discussed diagnosis, etiology, and precancerous nature of condition.   - Cryosurgery Procedure Note: Discussed procedure with patient/patient's guardian including risks and benefits as well as treatment alternatives. Risks of procedure include pain, itching, swelling, redness, blistering, crusting, wound formation, post-inflammatory pigmentary alteration, scar, recurrence. Verbal consent obtained. LN2 cryosurgery performed to 11 lesion(s). Patient tolerated procedure well. After-visit wound care instructions reviewed and provided in writing.      Actinic skin damage  -     fluorouraciL (EFUDEX) 5 % cream; AAA forehead BID x 2-4 weeks.  Dispense: 40 g; Refill: 1  - Discussed diagnosis, etiology, and treatment options.  - Efudex as above.   - Counseled on potential SE  of medication(s) and instructed on use.     Inflamed seborrheic keratosis  - Discussed diagnosis, etiology, and treatment options.   - Cryosurgery Procedure Note: Discussed procedure with patient/patient's guardian including risks and benefits as well as treatment alternatives. Risks of procedure include pain, itching, swelling, redness, blistering, crusting, wound formation, post-inflammatory pigmentary alteration, scar, recurrence. Verbal consent obtained. LN2 cryosurgery performed to 2 lesion(s). Patient tolerated procedure well. After-visit wound care instructions reviewed and provided in writing.     Multiple benign nevi  Lentigines  - Discussed diagnosis, etiology, and benign-nature of condition.  - Reassured; no lesions suspicious for malignancy noted on exam today.   - Recommended routine self examination of skin. Discussed the ABCDEs of melanoma and ugly duckling sign.   - Recommended daily sun protection, including the use of OTC broad-spectrum sunscreen (SPF 30 or greater) and sun-protective clothing.      SK (seborrheic keratosis)  Acrochordon  Cherry angioma  - Benign; reassured treatment not necessary.      Screening for skin cancer  - Total body skin examination performed today.  - Findings listed above.   - Recommended routine self examination of skin.    - Recommended daily sun protection, including the use of OTC broad-spectrum sunscreen (SPF 30 or greater) and sun-protective clothing.           Follow up in about 3 months (around 1/2/2025) for efudex, sooner pending path / prn.

## 2024-10-06 NOTE — PATIENT INSTRUCTIONS
Field Treatment for Actinic Keratoses (precancerous lesions)    5-Fluorouracil - This is a topical chemotherapy for your skin. This cream should never be applied without discussing with you dermatologist.    This treatment gets your immune system involved in fighting precancers (actinic keratoses), even those we can't yet see.     HOW TO APPLY: Apply a fingertip length amount to the entire area (forehead) 2x/day for 2-4 weeks. Wash your hands carefully after applying the cream and wipe glasses, BIPAP/CPAP machines, or anything else that comes in frequent contact with the cream.    WHAT TO EXPECT: Your skin will likely become red, crusted, sore, and tender during the treatment usually starting around day 3 and continuing for about 1 week after last application. Your skin may take up to 6 weeks to return to its normal coloring (lose the pink).     HOW TO HEAL FAST: To speed healing, wash with a gentle wash and apply Vaseline jelly especially to crusted open areas.    WE CAN HELP: Please contact us for any questions or problem shooting the application of these creams: [phone] or [email or web address]    IT WILL BE WORTH IT!!!     CRYOSURGERY      Your doctor has used a method called cryosurgery to treat your skin condition. Cryosurgery refers to the use of very cold substances to treat a variety of skin conditions such as warts, pre-skin cancers, molluscum contagiosum, sun spots, and several benign growths. The substance we use in cryosurgery is liquid nitrogen and is so cold (-195 degrees Celsius) that is burns when administered.     Following treatment in the office, the skin may immediately burn and become red. You may find the area around the lesion is affected as well. It is sometimes necessary to treat not only the lesion, but a small area of the surrounding normal skin to achieve a good response.     A blister, and even a blood filled blister, may form after treatment.   This is a normal response. If the  blister is painful, it is acceptable to sterilize a needle and with rubbing alcohol and gently pop the blister. It is important that you gently wash the area with soap and warm water as the blister fluid may contain wart virus if a wart was treated. Do no remove the roof of the blister.     The area treated can take anywhere from 1-3 weeks to heal. Healing time depends on the kind skin lesion treated, the location, and how aggressively the lesion was treated. It is recommended that the areas treated are covered with Vaseline or bacitracin ointment and a band-aid. If a band-aid is not practical, just ointment applied several times per day will do. Keeping these areas moist will speed the healing time.    Treatment with liquid nitrogen can leave a scar. In dark skin, it may be a light or dark scar, in light skin it may be a white or pink scar. These will generally fade with time, but may never go away completely.     If you have any concerns after your treatment, please feel free to call the office.       [facility] [place] [address]/ [phone] FAX/ [email or web address]      Shave Biopsy Wound Care    Your doctor has performed a shave biopsy today.  A band aid and vaseline ointment has been placed over the site.  This should remain in place for NO LONGER THAN 48 hours.  It is fine to remove the bandaid after 24 hours, if the area is no longer bleeding. It is recommended that you keep the area dry (do not wet)) for the first 24 hours.  After 24 hours, wash the area with warm soap and water and apply Vaseline jelly.  Many patients prefer to use Neosporin or Bacitracin ointment.  This is acceptable; however, know that you can develop an allergy to this medication even if you have used it safely for years.  It is important to keep the area moist.  Letting it dry out and get air slows healing time, and will worsen the scar.        If you notice increasing redness, tenderness, pain, or yellow  drainage at the biopsy site, please notify your doctor.  These are signs of an infection.    If your biopsy site is bleeding, apply firm pressure for 15 minutes straight.  Repeat for another 15 minutes, if it is still bleeding.   If the surgical site continues to bleed, then please contact your doctor.      For [facility] users:   You will receive your biopsy results in [facility] as soon as they are available. Please be assured that your physician/provider will review your results and will then determine what further treatment, evaluation, or planning is required. You should be contacted by your physician's/provider's office within 5 business days of receiving your results; If not, please reach out to directly. This is one more way [facility] is putting you first.     [facility] [place] [address]/ [phone] FAX/ [email or web address]     What Are the Symptoms of Skin Cancer?  A change in your skin is the most common sign of skin cancer. This could be a new growth, a sore that doesnt heal, or a change in a mole. Not all skin cancers look the same.    For melanoma specifically, a simple way to remember the warning signs is to remember the A-B-C-D-Es of melanoma--    A stands for asymmetrical. Does the mole or spot have an irregular shape with two parts that look very different?  B stands for border. Is the border irregular or jagged?  C is for color. Is the color uneven?  D is for diameter. Is the mole or spot larger than the size of a pea?  E is for evolving. Has the mole or spot changed during the past few weeks or months?    Talk to your doctor if you notice changes in your skin such as a new growth, a sore that doesnt heal, a change in an old growth, or any of the A-B-C-D-Es of melanoma    What Can I Do to Reduce My Risk of Skin Cancer?  Protection from ultraviolet (UV) radiation is important all year, not just during the summer or at the beach. UV rays from the sun can reach  you on cloudy and hazy days, not just on bright and ame days. UV rays also reflect off of surfaces like water, cement, sand, and snow. Indoor tanning (using a tanning bed, garcia, or sunlamp to get tan) exposes users to UV radiation.    The hours between 10 a.m. and 4 p.m. Daylight Saving Time (9 a.m. to 3 p.m. standard time) are the most hazardous for UV exposure outdoors in the continental [place]. UV rays from sunlight are the greatest during the late spring and early summer in [place].    CDC recommends easy options for protection from UV radiation--    Stay in the shade or indoors, especially during midday hours.  Wear clothing that covers your arms and legs.  Wear a hat with a wide brim to shade your face, head, ears, and neck.  Wear sunglasses that wrap around and block both UVA and UVB rays.  Use sunscreen with a sun protection factor (SPF) of 30 or higher, and both UVA and UVB (broad spectrum) protection.  Avoid indoor tanning.    Adapted from https://www.cdc.gov/cancer/skin/basic_info/

## 2024-10-07 LAB
FINAL PATHOLOGIC DIAGNOSIS: NORMAL
GROSS: NORMAL
Lab: NORMAL
MICROSCOPIC EXAM: NORMAL

## 2024-10-11 NOTE — PROGRESS NOTES
1. Skin, right wrist, shave biopsy:   - ACTINIC KERATOSIS.   2. Skin, left chest, shave biopsy:   - MELANOCYTIC NEVUS, COMPOUND TYPE.     Benign; reassured further treatment not necessary via portal message.

## 2024-10-30 ENCOUNTER — LAB VISIT (OUTPATIENT)
Dept: LAB | Facility: HOSPITAL | Age: 74
End: 2024-10-30
Attending: FAMILY MEDICINE
Payer: MEDICARE

## 2024-10-30 DIAGNOSIS — E78.5 HYPERLIPIDEMIA, UNSPECIFIED HYPERLIPIDEMIA TYPE: ICD-10-CM

## 2024-10-30 DIAGNOSIS — E78.5 HYPERLIPIDEMIA, UNSPECIFIED HYPERLIPIDEMIA TYPE: Chronic | ICD-10-CM

## 2024-10-30 DIAGNOSIS — I10 ESSENTIAL HYPERTENSION: ICD-10-CM

## 2024-10-30 DIAGNOSIS — I10 ESSENTIAL HYPERTENSION: Chronic | ICD-10-CM

## 2024-10-30 LAB
ALBUMIN SERPL BCP-MCNC: 4.1 G/DL (ref 3.5–5.2)
ALP SERPL-CCNC: 64 U/L (ref 40–150)
ALT SERPL W/O P-5'-P-CCNC: 18 U/L (ref 10–44)
ANION GAP SERPL CALC-SCNC: 13 MMOL/L (ref 8–16)
AST SERPL-CCNC: 20 U/L (ref 10–40)
BILIRUB SERPL-MCNC: 0.5 MG/DL (ref 0.1–1)
BUN SERPL-MCNC: 16 MG/DL (ref 8–23)
CALCIUM SERPL-MCNC: 10.4 MG/DL (ref 8.7–10.5)
CHLORIDE SERPL-SCNC: 103 MMOL/L (ref 95–110)
CHOLEST SERPL-MCNC: 171 MG/DL (ref 120–199)
CHOLEST/HDLC SERPL: 2.3 {RATIO} (ref 2–5)
CO2 SERPL-SCNC: 26 MMOL/L (ref 23–29)
CREAT SERPL-MCNC: 0.9 MG/DL (ref 0.5–1.4)
EST. GFR  (NO RACE VARIABLE): >60 ML/MIN/1.73 M^2
GLUCOSE SERPL-MCNC: 120 MG/DL (ref 70–110)
HDLC SERPL-MCNC: 75 MG/DL (ref 40–75)
HDLC SERPL: 43.9 % (ref 20–50)
LDLC SERPL CALC-MCNC: 72.4 MG/DL (ref 63–159)
NONHDLC SERPL-MCNC: 96 MG/DL
POTASSIUM SERPL-SCNC: 4.1 MMOL/L (ref 3.5–5.1)
PROT SERPL-MCNC: 7.6 G/DL (ref 6–8.4)
SODIUM SERPL-SCNC: 142 MMOL/L (ref 136–145)
TRIGL SERPL-MCNC: 118 MG/DL (ref 30–150)

## 2024-10-30 PROCEDURE — 36415 COLL VENOUS BLD VENIPUNCTURE: CPT | Mod: HCNC,PN | Performed by: FAMILY MEDICINE

## 2024-10-30 PROCEDURE — 80053 COMPREHEN METABOLIC PANEL: CPT | Mod: HCNC | Performed by: FAMILY MEDICINE

## 2024-10-30 PROCEDURE — 80061 LIPID PANEL: CPT | Mod: HCNC | Performed by: FAMILY MEDICINE

## 2024-10-30 RX ORDER — ATORVASTATIN CALCIUM 40 MG/1
TABLET, FILM COATED ORAL
Qty: 90 TABLET | Refills: 1 | Status: SHIPPED | OUTPATIENT
Start: 2024-10-30

## 2024-10-30 RX ORDER — LOSARTAN POTASSIUM 100 MG/1
TABLET ORAL
Qty: 90 TABLET | Refills: 1 | Status: SHIPPED | OUTPATIENT
Start: 2024-10-30

## 2024-10-30 RX ORDER — HYDROCHLOROTHIAZIDE 12.5 MG/1
CAPSULE ORAL
Qty: 90 CAPSULE | Refills: 1 | Status: SHIPPED | OUTPATIENT
Start: 2024-10-30

## 2024-11-06 ENCOUNTER — OFFICE VISIT (OUTPATIENT)
Dept: PRIMARY CARE CLINIC | Facility: CLINIC | Age: 74
End: 2024-11-06
Payer: MEDICARE

## 2024-11-06 VITALS
HEART RATE: 94 BPM | DIASTOLIC BLOOD PRESSURE: 84 MMHG | HEIGHT: 61 IN | OXYGEN SATURATION: 97 % | WEIGHT: 170.63 LBS | BODY MASS INDEX: 32.22 KG/M2 | SYSTOLIC BLOOD PRESSURE: 124 MMHG

## 2024-11-06 DIAGNOSIS — Z23 FLU VACCINE NEED: ICD-10-CM

## 2024-11-06 DIAGNOSIS — I10 ESSENTIAL HYPERTENSION: Primary | ICD-10-CM

## 2024-11-06 DIAGNOSIS — Z12.31 ENCOUNTER FOR SCREENING MAMMOGRAM FOR MALIGNANT NEOPLASM OF BREAST: ICD-10-CM

## 2024-11-06 DIAGNOSIS — M85.80 OSTEOPENIA AFTER MENOPAUSE: ICD-10-CM

## 2024-11-06 DIAGNOSIS — Z78.0 OSTEOPENIA AFTER MENOPAUSE: ICD-10-CM

## 2024-11-06 DIAGNOSIS — E78.5 HYPERLIPIDEMIA, UNSPECIFIED HYPERLIPIDEMIA TYPE: ICD-10-CM

## 2024-11-06 PROCEDURE — 99999 PR PBB SHADOW E&M-EST. PATIENT-LVL IV: CPT | Mod: PBBFAC,HCNC,, | Performed by: FAMILY MEDICINE

## 2024-11-06 PROCEDURE — G0008 ADMIN INFLUENZA VIRUS VAC: HCPCS | Mod: HCNC,S$GLB,, | Performed by: FAMILY MEDICINE

## 2024-11-06 PROCEDURE — 4010F ACE/ARB THERAPY RXD/TAKEN: CPT | Mod: HCNC,CPTII,S$GLB, | Performed by: FAMILY MEDICINE

## 2024-11-06 PROCEDURE — 3079F DIAST BP 80-89 MM HG: CPT | Mod: HCNC,CPTII,S$GLB, | Performed by: FAMILY MEDICINE

## 2024-11-06 PROCEDURE — 3008F BODY MASS INDEX DOCD: CPT | Mod: HCNC,CPTII,S$GLB, | Performed by: FAMILY MEDICINE

## 2024-11-06 PROCEDURE — 1159F MED LIST DOCD IN RCRD: CPT | Mod: HCNC,CPTII,S$GLB, | Performed by: FAMILY MEDICINE

## 2024-11-06 PROCEDURE — 90653 IIV ADJUVANT VACCINE IM: CPT | Mod: HCNC,S$GLB,, | Performed by: FAMILY MEDICINE

## 2024-11-06 PROCEDURE — 99214 OFFICE O/P EST MOD 30 MIN: CPT | Mod: HCNC,25,S$GLB, | Performed by: FAMILY MEDICINE

## 2024-11-06 PROCEDURE — 3074F SYST BP LT 130 MM HG: CPT | Mod: HCNC,CPTII,S$GLB, | Performed by: FAMILY MEDICINE

## 2024-11-06 PROCEDURE — 1126F AMNT PAIN NOTED NONE PRSNT: CPT | Mod: HCNC,CPTII,S$GLB, | Performed by: FAMILY MEDICINE

## 2024-11-06 PROCEDURE — 1160F RVW MEDS BY RX/DR IN RCRD: CPT | Mod: HCNC,CPTII,S$GLB, | Performed by: FAMILY MEDICINE

## 2024-11-06 NOTE — PROGRESS NOTES
Primary Care Provider Appointment   [facility] [place]       Patient ID: Katy Yarbrough is a 74 y.o. female.    ASSESSMENT/PLAN by Problem List:    Assessment & Plan    Continued hypertension management with amlodipine, hydrochlorothiazide, and losartan  Hyperlipidemia: LDL slightly above target at 72; recommended dietary changes without medication adjustment  Declined mammogram; no concerning breast symptoms reported I continue to recommend mammography but she continues to decline.  Continued annual fecal occult labs for colon cancer screening as patient declines colonoscopy  Mild age-related osteopenia; not meeting criteria for pharmacological intervention; planning DEXA scan next year  Reviewed weight loss medication options; patient declined due to cost    HYPERLIPIDEMIA:  - Discussed dietary strategies to lower cholesterol, focusing on reducing animal products and saturated fats.  - Explained differences in fat content between various protein sources (red meat, pork, poultry, fish).  - Provided information on the benefits of high-fiber foods for cholesterol management.  - Katy to increase consumption of fruits, vegetables, beans, and high-fiber foods.  - Recommend choosing leaner cuts of meat when consuming red meat or pork.  - Katy to opt for fish, chicken, and turkey as healthier protein sources.  - Recommend using small amounts of olive oil for cooking instead of other oils or fats.  - Katy to reduce intake of baked goods, cookies, and cakes.    AGE-RELATED VISION CHANGES:  - Educated on the importance of routine eye exams for age-related vision changes.  - Recommend scheduling an eye exam.    MEDICATIONS/SUPPLEMENTS:  - Continued amlodipine 5 mg daily, hydrochlorothiazide 12.5 mg daily, losartan 100 mg daily, and atorvastatin 40 mg daily.    FLU VACCINATION:  - Administered flu vaccine in office.    FOLLOW UP:  - Follow up in spring for annual visit and colon cancer  screening.  - Contact office if any issues arise before next appointment.         ORDERS, CODING, ADDITIONAL DOCUMENTATION RELATED TO THIS ENCOUNTER:  1. Essential hypertension  -     Comprehensive Metabolic Panel; Future; Expected date: 11/06/2024  -     CBC Auto Differential; Future; Expected date: 11/06/2024    2. Hyperlipidemia, unspecified hyperlipidemia type  -     Lipid Panel; Future; Expected date: 11/06/2024    3. Osteopenia after menopause    4. Encounter for screening mammogram for malignant neoplasm of breast    5. Flu vaccine need  -     influenza (adjuvanted) (Fluad) 45 mcg/0.5 mL IM vaccine (> or = 64 yo) 0.5 mL           Follow Up:  5-6 months    Subjective:       HPI    Patient is a/an 74 y.o.  female       For complete problem list, past medical history, surgical history, social history, etc., see appropriate section in the electronic medical record    History of Present Illness    CHIEF COMPLAINT:  Katy presents today for follow-up of hypertension and hyperlipidemia.    HYPERTENSION:  Current medications include amlodipine 5 mg daily, hydrochlorothiazide 12.5 mg daily, and losartan 100 mg daily.    HYPERLIPIDEMIA:  She is taking atorvastatin 40 mg daily. Her most recent lipid profile shows an LDL of 72, which is close to the target but indicates room for improvement through dietary modifications.    WEIGHT MANAGEMENT:  She reports gradual weight gain after previously losing a significant amount of weight. She acknowledges difficulty maintaining her diet and expresses dissatisfaction with her current weight status. She is interested in making dietary changes to support weight loss efforts and inquired about weight loss medication options.    CANCER SCREENINGS:  She is due for a mammogram and an order has been placed, but she has frequently postponed or declined the procedure. She denies feeling any unusual lumps, bumps, nipple discharge, nipple bleeding, or swollen glands in the arm. Regarding  colon cancer screening, she continues to decline colonoscopy but agrees to continue with annual fecal occult labs, which she has completed for the current year.    BONE HEALTH:  She has mild age-related bone loss/osteopenia. Her last bone density scan, performed less than two years ago, revealed a FRAX score indicating a 9.3% risk of major osteoporotic fracture and a 1.2% risk of hip fracture.    VACCINATIONS:  She has not received her flu vaccine this year and expresses interest in receiving it during the current visit. She confirms having received the latest COVID vaccine approximately a month ago and has previously received the RSV shot.    EYE HEALTH:  She reports being overdue for an eye exam and acknowledges the need for routine eye check-ups.    DERMATOLOGY:  She reports a recent visit to a dermatologist for evaluation of moles. The exam was unremarkable, with no concerning findings identified.      ROS:  General: +weight gain  Breasts: -nipple discharge       Review of Systems   Constitutional:  Negative for activity change.   Respiratory: Negative.  Negative for shortness of breath.    Cardiovascular: Negative.  Negative for chest pain.   Gastrointestinal: Negative.    Musculoskeletal: Negative.    Neurological: Negative.        Objective     Physical Exam  Vitals reviewed.   Constitutional:       General: She is not in acute distress.     Appearance: She is well-developed. She is not ill-appearing.   HENT:      Head: Normocephalic and atraumatic.   Eyes:      General: No scleral icterus.     Conjunctiva/sclera: Conjunctivae normal.   Cardiovascular:      Rate and Rhythm: Normal rate and regular rhythm.      Heart sounds: Normal heart sounds. No murmur heard.  Pulmonary:      Effort: Pulmonary effort is normal. No respiratory distress.      Breath sounds: Normal breath sounds. No wheezing or rales.   Skin:     General: Skin is dry.      Findings: No rash.   Neurological:      Mental Status: She is alert and  "oriented to person, place, and time.   Psychiatric:         Behavior: Behavior normal.       Vitals:    11/06/24 1010   BP: 124/84   BP Location: Left arm   Patient Position: Sitting   Pulse: 94   SpO2: 97%   Weight: 77.4 kg (170 lb 10.2 oz)   Height: 5' 1" (1.549 m)     Physical Exam                This note was generated with the assistance of ambient listening technology. Verbal consent was obtained by the patient and accompanying visitor(s) for the recording of patient appointment to facilitate this note. I attest to having reviewed and edited the generated note for accuracy, though some syntax or spelling errors may persist. Please contact the author of this note for any clarification.  Parts of the note were also generated with voice recognition software.  Occasionally this software will misinterpreted words or phrases    "

## 2025-01-27 ENCOUNTER — OFFICE VISIT (OUTPATIENT)
Facility: CLINIC | Age: 75
End: 2025-01-27
Payer: MEDICARE

## 2025-01-27 DIAGNOSIS — L57.8 ACTINIC SKIN DAMAGE: Primary | ICD-10-CM

## 2025-01-27 DIAGNOSIS — L82.0 INFLAMED SEBORRHEIC KERATOSIS: ICD-10-CM

## 2025-01-27 DIAGNOSIS — L82.1 SK (SEBORRHEIC KERATOSIS): ICD-10-CM

## 2025-01-27 DIAGNOSIS — L57.0 AK (ACTINIC KERATOSIS): ICD-10-CM

## 2025-01-27 PROCEDURE — 17110 DESTRUCTION B9 LES UP TO 14: CPT | Mod: S$GLB,,, | Performed by: DERMATOLOGY

## 2025-01-27 PROCEDURE — 17000 DESTRUCT PREMALG LESION: CPT | Mod: XS,S$GLB,, | Performed by: DERMATOLOGY

## 2025-01-27 PROCEDURE — 99999 PR PBB SHADOW E&M-EST. PATIENT-LVL II: CPT | Mod: PBBFAC,,, | Performed by: DERMATOLOGY

## 2025-01-27 PROCEDURE — 99499 UNLISTED E&M SERVICE: CPT | Mod: S$GLB,,, | Performed by: DERMATOLOGY

## 2025-01-27 NOTE — PROGRESS NOTES
Subjective:       Patient ID:  Katy Yarbrough is a 74 y.o. female who presents for   Chief Complaint   Patient presents with    Skin Check    Spot     Follow up efudex on face      HPI    Established patient.    Here for 3 month f/u efudex to forehead. Performed for about 2 weeks with response.    10/2024 - TBSE - bx as below, AK and ISK treated with cryo, otherwise only benign lesions noted.   Persistent small focal SK at nose - treated with LN2 multiple times with overall vast improvement.      10/2024  1. Skin, right wrist, shave biopsy:   - ACTINIC KERATOSIS.   2. Skin, left chest, shave biopsy:   - MELANOCYTIC NEVUS, COMPOUND TYPE.     +AK   cryotherapy    Review of Systems     Objective:    Physical Exam   Constitutional: She appears well-developed and well-nourished. She is cooperative. No distress.   HENT:   Head: Normocephalic and atraumatic.   Eyes: Lids are normal. Lids are normal.  Right conjunctiva is not injected. Left conjunctiva is not injected. No conjunctival no injection.   Pulmonary/Chest: No respiratory distress.   Musculoskeletal:      Right lower leg: No edema.      Left lower leg: No edema.   Neurological: She is alert and oriented to person, place, and time. She is not disoriented.   Psychiatric: She has a normal mood and affect. Her speech is normal and behavior is normal. Mood, affect, judgment and thought content normal.   Skin:                      Diagram Legend     Erythematous scaling macule/papule c/w actinic keratosis       Vascular papule c/w angioma      Pigmented verrucoid papule/plaque c/w seborrheic keratosis      Yellow umbilicated papule c/w sebaceous hyperplasia      Irregularly shaped tan macule c/w lentigo     1-2 mm smooth white papules consistent with Milia      Movable subcutaneous cyst with punctum c/w epidermal inclusion cyst      Subcutaneous movable cyst c/w pilar cyst      Firm pink to brown papule c/w dermatofibroma      Pedunculated fleshy papule(s) c/w skin  tag(s)      Evenly pigmented macule c/w junctional nevus     Mildly variegated pigmented, slightly irregular-bordered macule c/w mildly atypical nevus      Flesh colored to evenly pigmented papule c/w intradermal nevus       Pink pearly papule/plaque c/w basal cell carcinoma      Erythematous hyperkeratotic cursted plaque c/w SCC      Surgical scar with no sign of skin cancer recurrence      Open and closed comedones      Inflammatory papules and pustules      Verrucoid papule consistent consistent with wart     Erythematous eczematous patches and plaques     Dystrophic onycholytic nail with subungual debris c/w onychomycosis     Umbilicated papule    Erythematous-base heme-crusted tan verrucoid plaque consistent with inflamed seborrheic keratosis     Erythematous Silvery Scaling Plaque c/w Psoriasis     See annotation    Assessment / Plan:         Actinic skin damage  Significant improvement post Efudex field therapy.      AK (actinic keratosis)  - Discussed diagnosis, etiology, and precancerous nature of condition.   - Cryosurgery Procedure Note: Discussed procedure with patient/patient's guardian including risks and benefits as well as treatment alternatives. Risks of procedure include pain, itching, swelling, redness, blistering, crusting, wound formation, post-inflammatory pigmentary alteration, scar, recurrence. Verbal consent obtained. LN2 cryosurgery performed to 1 lesion(s). Patient tolerated procedure well. After-visit wound care instructions reviewed and provided in writing.      Inflamed seborrheic keratosis  - Discussed diagnosis, etiology, and treatment options.   - Cryosurgery Procedure Note: Discussed procedure with patient/patient's guardian including risks and benefits as well as treatment alternatives. Risks of procedure include pain, itching, swelling, redness, blistering, crusting, wound formation, post-inflammatory pigmentary alteration, scar, recurrence. Verbal consent obtained. LN2  cryosurgery performed to 1 lesion(s). Patient tolerated procedure well. After-visit wound care instructions reviewed and provided in writing.   Recheck in 1-2 months to ensure resolution; second treatment.     SK (seborrheic keratosis)  - Benign; reassured treatment not necessary.           Follow up in about 1 month (around 2/27/2025).

## 2025-01-27 NOTE — PATIENT INSTRUCTIONS

## 2025-02-10 ENCOUNTER — OFFICE VISIT (OUTPATIENT)
Dept: PRIMARY CARE CLINIC | Facility: CLINIC | Age: 75
End: 2025-02-10
Payer: MEDICARE

## 2025-02-10 VITALS
TEMPERATURE: 98 F | HEART RATE: 52 BPM | WEIGHT: 169.19 LBS | BODY MASS INDEX: 31.94 KG/M2 | DIASTOLIC BLOOD PRESSURE: 84 MMHG | SYSTOLIC BLOOD PRESSURE: 144 MMHG | HEIGHT: 61 IN | OXYGEN SATURATION: 96 %

## 2025-02-10 DIAGNOSIS — Z09 HOSPITAL DISCHARGE FOLLOW-UP: Primary | ICD-10-CM

## 2025-02-10 DIAGNOSIS — J30.89 ALLERGIC RHINITIS DUE TO OTHER ALLERGIC TRIGGER, UNSPECIFIED SEASONALITY: ICD-10-CM

## 2025-02-10 DIAGNOSIS — I10 ESSENTIAL HYPERTENSION: Chronic | ICD-10-CM

## 2025-02-10 PROCEDURE — 3008F BODY MASS INDEX DOCD: CPT | Mod: CPTII,S$GLB,, | Performed by: PHYSICIAN ASSISTANT

## 2025-02-10 PROCEDURE — 1101F PT FALLS ASSESS-DOCD LE1/YR: CPT | Mod: CPTII,S$GLB,, | Performed by: PHYSICIAN ASSISTANT

## 2025-02-10 PROCEDURE — 3077F SYST BP >= 140 MM HG: CPT | Mod: CPTII,S$GLB,, | Performed by: PHYSICIAN ASSISTANT

## 2025-02-10 PROCEDURE — 99214 OFFICE O/P EST MOD 30 MIN: CPT | Mod: S$GLB,,, | Performed by: PHYSICIAN ASSISTANT

## 2025-02-10 PROCEDURE — 1159F MED LIST DOCD IN RCRD: CPT | Mod: CPTII,S$GLB,, | Performed by: PHYSICIAN ASSISTANT

## 2025-02-10 PROCEDURE — 1126F AMNT PAIN NOTED NONE PRSNT: CPT | Mod: CPTII,S$GLB,, | Performed by: PHYSICIAN ASSISTANT

## 2025-02-10 PROCEDURE — 3288F FALL RISK ASSESSMENT DOCD: CPT | Mod: CPTII,S$GLB,, | Performed by: PHYSICIAN ASSISTANT

## 2025-02-10 PROCEDURE — 99999 PR PBB SHADOW E&M-EST. PATIENT-LVL IV: CPT | Mod: PBBFAC,,, | Performed by: PHYSICIAN ASSISTANT

## 2025-02-10 PROCEDURE — 3079F DIAST BP 80-89 MM HG: CPT | Mod: CPTII,S$GLB,, | Performed by: PHYSICIAN ASSISTANT

## 2025-02-10 RX ORDER — CHLORTHALIDONE 25 MG/1
25 TABLET ORAL DAILY
Qty: 90 TABLET | Refills: 3 | Status: SHIPPED | OUTPATIENT
Start: 2025-02-10 | End: 2026-02-10

## 2025-02-10 RX ORDER — AMOXICILLIN 500 MG/1
500 CAPSULE ORAL 2 TIMES DAILY
COMMUNITY
Start: 2025-02-02

## 2025-02-10 NOTE — PROGRESS NOTES
Primary Care Provider Appointment   [facility] [place]       Patient ID: Katy Yarbrough is a 74 y.o. female.    ASSESSMENT/PLAN by Problem List:    1. Hospital discharge follow-up    2. Essential hypertension  Assessment & Plan:  Patient states that her readings at home have been elevated more often. She is currently on 5 mg amlodipine, 100 mg losartan and 12.5 mg HCTZ. We discussed options and will discontinue the HCTZ in favor of 25 mg chlorthalidone. I have asked her to keep a BP log for 2 weeks. She will notify us with any BPs under 110 systolic. I have also asked her to bring in BP machine to next visit. Further recommendations can be made at that time.       3. Allergic rhinitis due to other allergic trigger, unspecified seasonality  Comments:  Still some fluid without infection. Advised flonase BID. Will re-check at follow up.    Other orders  -     chlorthalidone (HYGROTEN) 25 MG Tab; Take 1 tablet (25 mg total) by mouth once daily.  Dispense: 90 tablet; Refill: 3         Follow Up:  In 2 weeks    My total time spent on this encounter was 32 minutes which included the following activities: preparing to see the patient, performing a medically appropriate and/or evaluation, counseling and educating the patient and family/caregiver, ordering medications, tests, or procedures, referring and communicating with other healthcare providers, documenting clinical information in the electronic or other health record, and independently interpreting results. This time is independent and non-overlapping.        Subjective:     Chief Complaint   Patient presents with    Hypertension     Patient presents for ERFU from 2/8/25 for hypertension. Patient has been trying to take her BP at home, but her machine has been error'ing out and she thinks its because her BP is too high.     I have reviewed the information entered by the ancillary staff regarding the chief complaint as well as the related  history.    74-year-old female with a history of hypertension called the clinic this morning for urgent appointment.  She was seen in the ER 2 days ago on February 8th for elevated blood pressure.  Current medications are 5 mg amlodipine, 100 mg losartan and 12.5 mg hydrochlorothiazide.  She admitted to ER physician that she ate boil trip the previous day.  At that time patient denied any headache, chest pain, shortness a breath, vomiting, diarrhea, nausea, focal weakness or difficulty with vision or speech.  According to hospital note, EKG showed no abnormalities.  She had no symptoms that warranted blood work or chest x-ray per ER physician.  She was instructed to take her blood pressure again tomorrow and take an extra dose of the Norvasc.  She was told to avoid salt and follow-up with her primary care physician sometime this week.  He discussed that he did not want to permanently increase her meds because blood pressure was likely to returned to normal mid week.    Since then she has been hydrating. Sunday her BP was still high so she took a second amlodipine per ED docs advice. Yesterday evening (Sunday) her BP was 160/85. Her BP machine has also been giving an error reading. Prior to this incident she had been having some elevated BP readings at home. Has not ever brought in machine to be looked at.     Takes Losartan and HCTZ in morning and amlodipine in evening.     Patient recently treated for otitis media. She says her right ear still feels intermittently full. Has rhinorrhea. Takes Mucinex and allegra.       Patient is a/an 74 y.o.  female         For complete problem list, past medical history, surgical history, social history, etc., see appropriate section in the electronic medical record    Review of Systems   Constitutional:  Negative for chills and fever.   Respiratory:  Negative for shortness of breath.    Cardiovascular:  Negative for chest pain, palpitations and leg swelling.   Gastrointestinal:   "Negative for nausea.   All other systems reviewed and are negative.      Objective     Physical Exam  Vitals and nursing note reviewed.   Constitutional:       General: She is not in acute distress.     Appearance: Normal appearance. She is well-developed. She is not diaphoretic.   HENT:      Head: Normocephalic and atraumatic.      Right Ear: Ear canal and external ear normal. A middle ear effusion (clear) is present.      Left Ear: External ear normal. A middle ear effusion (clear) is present.   Eyes:      General:         Right eye: No discharge.         Left eye: No discharge.      Extraocular Movements: Extraocular movements intact.      Conjunctiva/sclera: Conjunctivae normal.   Neck:      Vascular: No JVD.   Cardiovascular:      Rate and Rhythm: Normal rate and regular rhythm.      Heart sounds: Normal heart sounds. No murmur heard.  Pulmonary:      Effort: Pulmonary effort is normal. No respiratory distress.   Skin:     General: Skin is warm and dry.      Coloration: Skin is not jaundiced or pale.   Neurological:      Mental Status: She is alert.   Psychiatric:         Mood and Affect: Mood normal.         Behavior: Behavior normal.         Thought Content: Thought content normal.         Judgment: Judgment normal.       Vitals:    02/10/25 0829   BP: (!) 144/84   BP Location: Left arm   Patient Position: Sitting   Pulse: (!) 52   Temp: 98.4 °F (36.9 °C)   SpO2: 96%   Weight: 76.8 kg (169 lb 3.3 oz)   Height: 5' 1" (1.549 m)           THIS DOCUMENT WAS MADE IN PART WITH VOICE RECOGNITION SOFTWARE.  OCCASIONALLY THIS SOFTWARE WILL MISINTERPRET WORDS OR PHRASES.  "

## 2025-02-10 NOTE — ASSESSMENT & PLAN NOTE
Patient states that her readings at home have been elevated more often. She is currently on 5 mg amlodipine, 100 mg losartan and 12.5 mg HCTZ. We discussed options and will discontinue the HCTZ in favor of 25 mg chlorthalidone. I have asked her to keep a BP log for 2 weeks. She will notify us with any BPs under 110 systolic. I have also asked her to bring in BP machine to next visit. Further recommendations can be made at that time.

## 2025-02-24 ENCOUNTER — OFFICE VISIT (OUTPATIENT)
Dept: PRIMARY CARE CLINIC | Facility: CLINIC | Age: 75
End: 2025-02-24
Payer: MEDICARE

## 2025-02-24 VITALS
TEMPERATURE: 98 F | SYSTOLIC BLOOD PRESSURE: 171 MMHG | BODY MASS INDEX: 32.05 KG/M2 | OXYGEN SATURATION: 98 % | HEART RATE: 63 BPM | HEIGHT: 61 IN | DIASTOLIC BLOOD PRESSURE: 93 MMHG | WEIGHT: 169.75 LBS | RESPIRATION RATE: 16 BRPM

## 2025-02-24 DIAGNOSIS — I10 ESSENTIAL HYPERTENSION: Primary | Chronic | ICD-10-CM

## 2025-02-24 DIAGNOSIS — H92.01 RIGHT EAR PAIN: ICD-10-CM

## 2025-02-24 PROCEDURE — 3288F FALL RISK ASSESSMENT DOCD: CPT | Mod: CPTII,S$GLB,, | Performed by: PHYSICIAN ASSISTANT

## 2025-02-24 PROCEDURE — 99999 PR PBB SHADOW E&M-EST. PATIENT-LVL V: CPT | Mod: PBBFAC,,, | Performed by: PHYSICIAN ASSISTANT

## 2025-02-24 PROCEDURE — 1159F MED LIST DOCD IN RCRD: CPT | Mod: CPTII,S$GLB,, | Performed by: PHYSICIAN ASSISTANT

## 2025-02-24 PROCEDURE — 1101F PT FALLS ASSESS-DOCD LE1/YR: CPT | Mod: CPTII,S$GLB,, | Performed by: PHYSICIAN ASSISTANT

## 2025-02-24 PROCEDURE — 3077F SYST BP >= 140 MM HG: CPT | Mod: CPTII,S$GLB,, | Performed by: PHYSICIAN ASSISTANT

## 2025-02-24 PROCEDURE — 99214 OFFICE O/P EST MOD 30 MIN: CPT | Mod: S$GLB,,, | Performed by: PHYSICIAN ASSISTANT

## 2025-02-24 PROCEDURE — 3078F DIAST BP <80 MM HG: CPT | Mod: CPTII,S$GLB,, | Performed by: PHYSICIAN ASSISTANT

## 2025-02-24 PROCEDURE — 1126F AMNT PAIN NOTED NONE PRSNT: CPT | Mod: CPTII,S$GLB,, | Performed by: PHYSICIAN ASSISTANT

## 2025-02-24 PROCEDURE — 3008F BODY MASS INDEX DOCD: CPT | Mod: CPTII,S$GLB,, | Performed by: PHYSICIAN ASSISTANT

## 2025-02-24 RX ORDER — ZINC GLUCONATE 50 MG
50 TABLET ORAL DAILY
COMMUNITY

## 2025-02-24 RX ORDER — PREDNISONE 20 MG/1
20 TABLET ORAL DAILY
Qty: 5 TABLET | Refills: 0 | Status: SHIPPED | OUTPATIENT
Start: 2025-02-24 | End: 2025-03-01

## 2025-02-24 RX ORDER — CETIRIZINE HYDROCHLORIDE 10 MG/1
10 TABLET ORAL DAILY
COMMUNITY

## 2025-02-24 NOTE — ASSESSMENT & PLAN NOTE
Patient did bring BP log. Possible that BPs today increasing due to anxiety. Still, given patient on 3 medicines with elevated readings, will obtain aldosterone renin activity ratio. I have asked that she continue to keep a log of her blood pressures.

## 2025-02-24 NOTE — PROGRESS NOTES
Primary Care Provider Appointment   [facility] [place]       Patient ID: Katy Yarbrough is a 74 y.o. female.    ASSESSMENT/PLAN by Problem List:    1. Essential hypertension  Assessment & Plan:  Patient did bring BP log. Possible that BPs today increasing due to anxiety. Still, given patient on 3 medicines with elevated readings, will obtain aldosterone renin activity ratio. I have asked that she continue to keep a log of her blood pressures.    Orders:  -     Aldosterone/Renin Activity Ratio; Future; Expected date: 02/24/2025    2. Right ear pain  Comments:  Still with fluid, but no erythema. Will try short course steroids and check on patient. May need ENT referral.    Other orders  -     predniSONE (DELTASONE) 20 MG tablet; Take 1 tablet (20 mg total) by mouth once daily. for 5 days  Dispense: 5 tablet; Refill: 0       Assessment & Plan    IMPRESSION:  - Evaluated blood pressure measurements, noting discrepancies between manual and automatic readings  - Assessed ear discomfort; observed fluid behind left ear but no visible infection  - Will trial short course of oral steroids for ear symptoms before referring to ENT  - Maintained current antihypertensive regimen pending lab results; may consider switching to potassium-sparing diuretic (spironolactone) if indicated  - Continued current allergy management with Zyrtec and Flonase    HYPERTENSION:  - Instructed patient to monitor blood pressure at home daily, 1.5 to 2 hours after taking medication.  - Educated about thresholds for seeking immediate medical attention (>180 with symptoms or >200 without symptoms).  - Continued current antihypertensive medications.  - Recommend reducing salt intake, particularly chips consumption.    SINUS ISSUES:  - Noted constant year-round sinus problems.  - Examination showed no signs of infection.  - Consider referral to ENT if symptoms don't improve.    ALLERGIES:  - Recommend continuing Flonase  and Zyrtec as previously prescribed.  - Explained typical duration (3 months) before needing to switch allergy medications.    EAR PAIN:  - Observed fluid behind the left ear also during exam.  - Prescribed a short course of prednisone (1 tablet daily in the morning for 5 days) for potential eustachian tube irritation.  - Discussed potential side effects of steroids, including mild blood pressure elevation and possible sleep disturbance.  - Advised patient to contact the office in about a week to assess response to prednisone.  - Consider referral to ENT if symptoms don't improve.          Follow Up:  As scheduled    My total time spent on this encounter was 31 minutes which included the following activities: preparing to see the patient, performing a medically appropriate and/or evaluation, counseling and educating the patient and family/caregiver, ordering medications, tests, or procedures, referring and communicating with other healthcare providers, documenting clinical information in the electronic or other health record, and independently interpreting results. This time is independent and non-overlapping.        Subjective:     Chief Complaint   Patient presents with    Blood Pressure Check     Brought in home BP log and machine to check for accuracy    Ear Fullness     Fullness in the R ear for a few weeks     I have reviewed the information entered by the ancillary staff regarding the chief complaint as well as the related history.    HPI    Patient is a/an 74 y.o.  female     History of Present Illness    CHIEF COMPLAINT:  Katy presents for follow-up on blood pressure management and to discuss ongoing ear discomfort.    HPI:  Katy has ongoing issues with blood pressure control. She has been keeping a log of blood pressure readings. She reports feeling nervous when getting blood pressure checked at the office, potentially affecting the readings.    She mentions significant ear pain. She has been taking  Zyrtec for 2 weeks for allergy symptoms. She reports constant, year-round sinus problems, for which she takes Mucinex.    2 weeks ago, she had a panic episode related to her blood pressure that led her to go to the hospital.  She denies changes in vision, diabetes, heart arrhythmias, or clotting disorders.    MEDICATIONS:  Katy is on Zyrtec for allergies, which she has been taking for 2 weeks. She is also on Flonase for allergies and sinus problems, and Mucinex for sinus problems. She takes blood pressure medication in the morning.    MEDICAL HISTORY:  Katy has a history of hypertension.      ROS:  Eyes: -vision changes  ENT: +ear pain  Psychiatric: +anxiety          For complete problem list, past medical history, surgical history, social history, etc., see appropriate section in the electronic medical record      Objective     Physical Exam  Vitals and nursing note reviewed.   Constitutional:       General: She is not in acute distress.     Appearance: Normal appearance. She is not ill-appearing.   HENT:      Head: Normocephalic and atraumatic.      Right Ear: Ear canal and external ear normal. A middle ear effusion is present. Tympanic membrane is not erythematous.      Left Ear: Ear canal and external ear normal. A middle ear effusion is present. Tympanic membrane is not erythematous.   Eyes:      General:         Right eye: No discharge.         Left eye: No discharge.      Extraocular Movements: Extraocular movements intact.      Conjunctiva/sclera: Conjunctivae normal.   Cardiovascular:      Rate and Rhythm: Normal rate and regular rhythm.   Pulmonary:      Effort: Pulmonary effort is normal. No respiratory distress.   Skin:     General: Skin is warm and dry.      Coloration: Skin is not jaundiced or pale.   Neurological:      Mental Status: She is alert.   Psychiatric:         Mood and Affect: Mood normal.         Behavior: Behavior normal.         Thought Content: Thought content normal.          "Judgment: Judgment normal.       Vitals:    02/24/25 0932 02/24/25 0941 02/24/25 0946 02/24/25 0951   BP: (!) 143/74 (!) 156/84 (!) 160/84 (!) 171/93   BP Location: Left arm Left arm Right arm Right arm   Patient Position: Sitting Sitting Sitting Sitting   Pulse: 63      Resp: 16      Temp: 97.9 °F (36.6 °C)      TempSrc: Oral      SpO2: 98%      Weight: 77 kg (169 lb 12.1 oz)      Height: 5' 1" (1.549 m)          This note was generated with the assistance of ambient listening technology. Verbal consent was obtained by the patient and accompanying visitor(s) for the recording of patient appointment to facilitate this note. I attest to having reviewed and edited the generated note for accuracy, though some syntax or spelling errors may persist. Please contact the author of this note for any clarification.       THIS DOCUMENT WAS MADE IN PART WITH VOICE RECOGNITION SOFTWARE.  OCCASIONALLY THIS SOFTWARE WILL MISINTERPRET WORDS OR PHRASES.  "

## 2025-02-25 ENCOUNTER — LAB VISIT (OUTPATIENT)
Dept: LAB | Facility: HOSPITAL | Age: 75
End: 2025-02-25
Attending: PHYSICIAN ASSISTANT
Payer: MEDICARE

## 2025-02-25 DIAGNOSIS — I10 ESSENTIAL HYPERTENSION: Chronic | ICD-10-CM

## 2025-02-25 PROCEDURE — 36415 COLL VENOUS BLD VENIPUNCTURE: CPT | Mod: PN | Performed by: PHYSICIAN ASSISTANT

## 2025-02-25 PROCEDURE — 82088 ASSAY OF ALDOSTERONE: CPT | Performed by: PHYSICIAN ASSISTANT

## 2025-02-28 ENCOUNTER — TELEPHONE (OUTPATIENT)
Dept: PRIMARY CARE CLINIC | Facility: CLINIC | Age: 75
End: 2025-02-28
Payer: MEDICARE

## 2025-02-28 LAB
ALDOST SERPL-MCNC: 8 NG/DL
ALDOST/RENIN PLAS-RTO: 5.3 RATIO
RENIN PLAS-CCNC: 1.5 NG/ML/HR

## 2025-02-28 RX ORDER — OLMESARTAN MEDOXOMIL 20 MG/1
20 TABLET ORAL DAILY
Qty: 30 TABLET | Refills: 1 | Status: SHIPPED | OUTPATIENT
Start: 2025-02-28 | End: 2026-02-28

## 2025-02-28 NOTE — TELEPHONE ENCOUNTER
Patient called to update Pam on BP, following her appointment on Monday.    Tuesday /85  Wednesday /94  Thursday /92  Friday AM hasn't taken BP yet, as she just took Rx.    She took Prednisone Wednesday, but it made her jittery and nervous, so she didn't take it Thursday or today.    #[phone]

## 2025-03-13 ENCOUNTER — OFFICE VISIT (OUTPATIENT)
Facility: CLINIC | Age: 75
End: 2025-03-13
Payer: MEDICARE

## 2025-03-13 ENCOUNTER — OFFICE VISIT (OUTPATIENT)
Dept: HEMATOLOGY/ONCOLOGY | Facility: CLINIC | Age: 75
End: 2025-03-13
Payer: MEDICARE

## 2025-03-13 ENCOUNTER — LAB VISIT (OUTPATIENT)
Dept: LAB | Facility: HOSPITAL | Age: 75
End: 2025-03-13
Attending: INTERNAL MEDICINE
Payer: MEDICARE

## 2025-03-13 VITALS
BODY MASS INDEX: 32.49 KG/M2 | DIASTOLIC BLOOD PRESSURE: 71 MMHG | HEART RATE: 69 BPM | SYSTOLIC BLOOD PRESSURE: 166 MMHG | OXYGEN SATURATION: 95 % | TEMPERATURE: 97 F | WEIGHT: 171.94 LBS

## 2025-03-13 DIAGNOSIS — R71.8 ELEVATED HEMATOCRIT: ICD-10-CM

## 2025-03-13 DIAGNOSIS — D48.5 NEOPLASM OF UNCERTAIN BEHAVIOR OF SKIN: Primary | ICD-10-CM

## 2025-03-13 DIAGNOSIS — C44.629 SQUAMOUS CELL CARCINOMA OF SKIN OF LEFT UPPER ARM: ICD-10-CM

## 2025-03-13 DIAGNOSIS — D75.1 ERYTHROCYTOSIS: Primary | ICD-10-CM

## 2025-03-13 LAB
BASOPHILS # BLD AUTO: 0.04 K/UL (ref 0–0.2)
BASOPHILS NFR BLD: 0.3 % (ref 0–1.9)
DIFFERENTIAL METHOD BLD: ABNORMAL
EOSINOPHIL # BLD AUTO: 0.2 K/UL (ref 0–0.5)
EOSINOPHIL NFR BLD: 1.8 % (ref 0–8)
ERYTHROCYTE [DISTWIDTH] IN BLOOD BY AUTOMATED COUNT: 12.3 % (ref 11.5–14.5)
HCT VFR BLD AUTO: 45 % (ref 37–48.5)
HGB BLD-MCNC: 15 G/DL (ref 12–16)
IMM GRANULOCYTES # BLD AUTO: 0.03 K/UL (ref 0–0.04)
IMM GRANULOCYTES NFR BLD AUTO: 0.3 % (ref 0–0.5)
LYMPHOCYTES # BLD AUTO: 2.2 K/UL (ref 1–4.8)
LYMPHOCYTES NFR BLD: 19 % (ref 18–48)
MCH RBC QN AUTO: 30.7 PG (ref 27–31)
MCHC RBC AUTO-ENTMCNC: 33.3 G/DL (ref 32–36)
MCV RBC AUTO: 92 FL (ref 82–98)
MONOCYTES # BLD AUTO: 0.7 K/UL (ref 0.3–1)
MONOCYTES NFR BLD: 6.2 % (ref 4–15)
NEUTROPHILS # BLD AUTO: 8.5 K/UL (ref 1.8–7.7)
NEUTROPHILS NFR BLD: 72.4 % (ref 38–73)
NRBC BLD-RTO: 0 /100 WBC
PLATELET # BLD AUTO: 247 K/UL (ref 150–450)
PMV BLD AUTO: 10 FL (ref 9.2–12.9)
RBC # BLD AUTO: 4.89 M/UL (ref 4–5.4)
WBC # BLD AUTO: 11.69 K/UL (ref 3.9–12.7)

## 2025-03-13 PROCEDURE — 17261 DSTRJ MAL LES T/A/L .6-1.0CM: CPT | Mod: S$GLB,,, | Performed by: DERMATOLOGY

## 2025-03-13 PROCEDURE — 4010F ACE/ARB THERAPY RXD/TAKEN: CPT | Mod: CPTII,S$GLB,, | Performed by: INTERNAL MEDICINE

## 2025-03-13 PROCEDURE — 1101F PT FALLS ASSESS-DOCD LE1/YR: CPT | Mod: CPTII,S$GLB,, | Performed by: INTERNAL MEDICINE

## 2025-03-13 PROCEDURE — 36415 COLL VENOUS BLD VENIPUNCTURE: CPT | Mod: PN | Performed by: INTERNAL MEDICINE

## 2025-03-13 PROCEDURE — 88305 TISSUE EXAM BY PATHOLOGIST: CPT | Mod: 26,,, | Performed by: PATHOLOGY

## 2025-03-13 PROCEDURE — 99214 OFFICE O/P EST MOD 30 MIN: CPT | Mod: S$GLB,,, | Performed by: INTERNAL MEDICINE

## 2025-03-13 PROCEDURE — 1126F AMNT PAIN NOTED NONE PRSNT: CPT | Mod: CPTII,S$GLB,, | Performed by: INTERNAL MEDICINE

## 2025-03-13 PROCEDURE — 3008F BODY MASS INDEX DOCD: CPT | Mod: CPTII,S$GLB,, | Performed by: INTERNAL MEDICINE

## 2025-03-13 PROCEDURE — 99999 PR PBB SHADOW E&M-EST. PATIENT-LVL III: CPT | Mod: PBBFAC,,, | Performed by: DERMATOLOGY

## 2025-03-13 PROCEDURE — 11102 TANGNTL BX SKIN SINGLE LES: CPT | Mod: XS,S$GLB,, | Performed by: DERMATOLOGY

## 2025-03-13 PROCEDURE — 99499 UNLISTED E&M SERVICE: CPT | Mod: S$GLB,,, | Performed by: DERMATOLOGY

## 2025-03-13 PROCEDURE — 88305 TISSUE EXAM BY PATHOLOGIST: CPT | Mod: 59 | Performed by: PATHOLOGY

## 2025-03-13 PROCEDURE — 3078F DIAST BP <80 MM HG: CPT | Mod: CPTII,S$GLB,, | Performed by: INTERNAL MEDICINE

## 2025-03-13 PROCEDURE — 3077F SYST BP >= 140 MM HG: CPT | Mod: CPTII,S$GLB,, | Performed by: INTERNAL MEDICINE

## 2025-03-13 PROCEDURE — 85025 COMPLETE CBC W/AUTO DIFF WBC: CPT | Mod: PN | Performed by: INTERNAL MEDICINE

## 2025-03-13 PROCEDURE — 99999 PR PBB SHADOW E&M-EST. PATIENT-LVL II: CPT | Mod: PBBFAC,,, | Performed by: INTERNAL MEDICINE

## 2025-03-13 PROCEDURE — 3288F FALL RISK ASSESSMENT DOCD: CPT | Mod: CPTII,S$GLB,, | Performed by: INTERNAL MEDICINE

## 2025-03-13 RX ORDER — HYDROCHLOROTHIAZIDE 12.5 MG/1
1 CAPSULE ORAL DAILY
COMMUNITY

## 2025-03-13 NOTE — PROGRESS NOTES
Subjective     Patient ID: Katy Yarbrough is a 74 y.o. female.    Chief Complaint: No chief complaint on file.    HPI    Mrs Yarbrough returns today for follow-up.  This is the second time that I am seeing her.  Her initial visit was in mid September.  Briefly, she is a 74-year-old female who had previously seen by Dr. Soria in March of the year for erythrocytosis.  I have reviewed Dr. Soria's note.  Her workup was as follows:  There was no evidence of a JAK2 mutation  EPO level was not inappropriately low  Her CBC today shows a white count of 11,600 per cubic mm, hemoglobin 15 grams/deciliter, hematocrit 45.0%, MCV 92 and platelets 247K.      Review of Systems  Overall she feels well.  When asked previously whether she snores she had stated that she sleeps by herself but occasionally has woken herself up from her snoring.  Her ECOG PS is 1.    She denies any anxiety, depression, easy bruising, fevers, chills, night  sweats, weight loss, nausea, vomiting, diarrhea, constipation, diplopia,   blurred vision, headache, chest pain, palpitations, shortness of breath, breast pain, abdominal pain, extremity pain, or difficulty ambulating.  The remainder of the ten-point ROS, including general, skin, lymph, H/N, cardiorespiratory, GI, , Neuro, Endocrine, and psychiatric is negative.     Physical Exam       She is alert, oriented to time, place, person, pleasant, well      nourished, in no acute physical distress.                                    VITAL SIGNS:  Reviewed                                      HEENT:  Normal.  There are no nasal, oral, lip, gingival, auricular, lid,    or conjunctival lesions.  Mucosae are moist and pink, and there is no        thrush.  Pupils are equal, reactive to light and accommodation.              Extraocular muscle movements are intact.  Dentition is good.  There is no frontal or maxillary tenderness.                                     NECK:  Supple without JVD, adenopathy, or  thyromegaly.                       LUNGS:  Clear to auscultation without wheezing, rales, or rhonchi.           CARDIOVASCULAR:  Reveals an S1, S2, no murmurs, no rubs, no gallops.         ABDOMEN:  Soft, nontender, without organomegaly.  Bowel sounds are    present.                                                                     EXTREMITIES:  No cyanosis, clubbing, or edema.                               BREASTS:  Deferred                                     LYMPHATIC:  There is no cervical, axillary, or supraclavicular adenopathy.   SKIN:  Warm and moist, without petechiae, rashes, induration, or ecchymoses.           NEUROLOGIC:  DTRs are 0-1+ bilaterally, symmetrical, motor function is 5/5,  and cranial nerves are  within normal limits.    ASSESSMENT  Secondary erythrocytosis.  P vera has been effectively ruled out.  Possible sleep apnea    PLAN  I had a long discussion with her.  We went over the differential for erythrocytosis.  I explained to her that she does not have a P vera in no treatment is indicated.  She will be referred back to her primary care physician at this point, and I will be happy to see her on a prn basis. I have E mailed Dr. Garvey to make him aware.  .  Her multiple questions were answered to her satisfaction.  I spent 30 minutes reviewing the available records evaluating the patient, in counseling and coordinating her care with her PCP.

## 2025-03-14 DIAGNOSIS — I10 ESSENTIAL HYPERTENSION: ICD-10-CM

## 2025-03-14 DIAGNOSIS — E78.5 HYPERLIPIDEMIA, UNSPECIFIED HYPERLIPIDEMIA TYPE: ICD-10-CM

## 2025-03-14 RX ORDER — CHLORTHALIDONE 25 MG/1
25 TABLET ORAL DAILY
Qty: 90 TABLET | Refills: 1 | Status: SHIPPED | OUTPATIENT
Start: 2025-03-14 | End: 2025-09-10

## 2025-03-14 RX ORDER — AMLODIPINE BESYLATE 5 MG/1
5 TABLET ORAL DAILY
Qty: 90 TABLET | Refills: 1 | Status: SHIPPED | OUTPATIENT
Start: 2025-03-14

## 2025-03-14 RX ORDER — ATORVASTATIN CALCIUM 40 MG/1
40 TABLET, FILM COATED ORAL NIGHTLY
Qty: 90 TABLET | Refills: 1 | Status: SHIPPED | OUTPATIENT
Start: 2025-03-14

## 2025-03-14 RX ORDER — OLMESARTAN MEDOXOMIL 20 MG/1
20 TABLET ORAL DAILY
Qty: 90 TABLET | Refills: 1 | Status: SHIPPED | OUTPATIENT
Start: 2025-03-14 | End: 2025-09-10

## 2025-03-14 RX ORDER — TRAZODONE HYDROCHLORIDE 50 MG/1
50 TABLET ORAL NIGHTLY
Qty: 90 TABLET | Refills: 1 | Status: SHIPPED | OUTPATIENT
Start: 2025-03-14

## 2025-03-14 NOTE — TELEPHONE ENCOUNTER
Called pt and LM to call back if she has any questions; however, we are sending in her refill requests to [facility].

## 2025-03-14 NOTE — TELEPHONE ENCOUNTER
----- Message from Kristal sent at 3/14/2025  8:41 AM CDT -----  Regarding: refills needed  [phone] - CALL BACK ; asking if we have received the [facility] under [facility] ; stating she no longer taking Lozartan but olmesartan (BENICAR) 20 MG tablet also need the traZODone (DESYREL) 50 MG tablet added on the list neededamLODIPine (NORVASC) 5 MG tabletatorvastatin (LIPITOR) 40 MG tabletchlorthalidone (HYGROTEN) 25 MG Tabolmesartan (BENICAR) 20 MG tabletAnnabelle

## 2025-03-15 NOTE — PROGRESS NOTES
Subjective:       Patient ID:  Katy Yarbrough is a 74 y.o. female who presents for   Chief Complaint   Patient presents with    Follow-up     Spot check back      HPI    Established patient.    Here for 2 month f/u lesion at back. Treated as ISK with LN2.   Pt also c/o new lesion at LUE.     +AK   Cryo, Efudex field therapy (forehead late 2024)    Review of Systems     Objective:    Physical Exam   Constitutional: She appears well-developed and well-nourished. She is cooperative. No distress.   HENT:   Head: Normocephalic and atraumatic.   Eyes: Lids are normal. Lids are normal.  Right conjunctiva is not injected. Left conjunctiva is not injected. No conjunctival no injection.   Pulmonary/Chest: No respiratory distress.   Musculoskeletal:      Right lower leg: No edema.      Left lower leg: No edema.   Neurological: She is alert and oriented to person, place, and time. She is not disoriented.   Psychiatric: She has a normal mood and affect. Her speech is normal and behavior is normal. Mood, affect, judgment and thought content normal.   Skin:                 Diagram Legend     Erythematous scaling macule/papule c/w actinic keratosis       Vascular papule c/w angioma      Pigmented verrucoid papule/plaque c/w seborrheic keratosis      Yellow umbilicated papule c/w sebaceous hyperplasia      Irregularly shaped tan macule c/w lentigo     1-2 mm smooth white papules consistent with Milia      Movable subcutaneous cyst with punctum c/w epidermal inclusion cyst      Subcutaneous movable cyst c/w pilar cyst      Firm pink to brown papule c/w dermatofibroma      Pedunculated fleshy papule(s) c/w skin tag(s)      Evenly pigmented macule c/w junctional nevus     Mildly variegated pigmented, slightly irregular-bordered macule c/w mildly atypical nevus      Flesh colored to evenly pigmented papule c/w intradermal nevus       Pink pearly papule/plaque c/w basal cell carcinoma      Erythematous hyperkeratotic cursted plaque  c/w SCC      Surgical scar with no sign of skin cancer recurrence      Open and closed comedones      Inflammatory papules and pustules      Verrucoid papule consistent consistent with wart     Erythematous eczematous patches and plaques     Dystrophic onycholytic nail with subungual debris c/w onychomycosis     Umbilicated papule    Erythematous-base heme-crusted tan verrucoid plaque consistent with inflamed seborrheic keratosis     Erythematous Silvery Scaling Plaque c/w Psoriasis     See annotation            Assessment / Plan:      Pathology Orders:       Normal Orders This Visit    Specimen to Pathology, Dermatology     Comments:    Number of Specimens:->2  ------------------------->-------------------------  Spec 1 Procedure:->Biopsy  Spec 1 Clinical Impression:->r/o scc-ka  Spec 1 Source:->left proximal inner forearm  ------------------------->-------------------------  Spec 2 Procedure:->Biopsy  Spec 2 Clinical Impression:->ISK refractory to LN2 r/o  atypical melanocytic / mm vs pscc  Spec 2 Source:->left mid back  Release to patient->Immediate    Questions:    Procedure Type: Dermatology and skin neoplasms    Number of Specimens: 2    ------------------------: -------------------------    Spec 1 Procedure: Biopsy    Spec 1 Clinical Impression: r/o scc-ka    Spec 1 Source: left proximal inner forearm    ------------------------: -------------------------    Spec 2 Procedure: Biopsy    Spec 2 Clinical Impression: ISK refractory to LN2 r/o atypical melanocytic / mm vs pscc    Spec 2 Source: left mid back    Release to patient: Immediate          Neoplasm of uncertain behavior of skin - L mid back  -     Specimen to Pathology, Dermatology  - Discussed diagnosis with patient and explained uncertain nature of condition, including differential DDX.   - Discussed treatment options (biopsy, close monitoring) with patient, including the risks and benefits of each. Patient opted to pursue biopsy.  - Shave Biopsy  Procedure Note: Discussed procedure with patient/patient's guardian including risks and benefits as well as treatment alternatives. Risks of procedure include pain, bleeding, infection, post-inflammatory pigmentary alteration, scar, recurrence. Patient informed that the purpose of a biopsy is sampling of condition in question rather than removal in entirety; further treatment may be necessary. Verbal consent obtained. Area to be biopsied marked and cleansed with alcohol. Local anesthesia achieved by injecting approximately 1 cc of 1% lidocaine with epinephrine. One shave biopsy performed using a double edge razor blade; specimen submitted to pathology. Hemostasis achieved with aluminum chloride. Petroleum jelly and bandage applied to wound. Patient tolerated procedure well. After-visit wound care instructions reviewed and provided in writing.     NUB - L proximal inner forearm  - Discussed diagnosis with patient and explained suspicious nature of condition, including ddx.   - Discussed treatment options (biopsy +/- EDC, closing monitoring) with patient, including the risks and benefits of each. Patient opted to pursue biopsy followed by EDC  - Shave Biopsy Procedure Note: Discussed procedure with patient/patient's guardian including risks and benefits as well as treatment alternatives. Risks of procedure include pain, bleeding, infection, post-inflammatory pigmentary alteration, scar, recurrence. Patient informed that the purpose of a biopsy is sampling of condition in question rather than removal in entirety; further treatment may be necessary. Verbal consent obtained. Area to be biopsied marked and cleansed with alcohol. Local anesthesia achieved by injecting approximately 1 cc of 1% lidocaine with epinephrine. A double edge razor blade used for biopsy; specimen submitted to pathology. Patient tolerated procedure well.   FOLLOWED IMMEDIATELY BY  - Electrodessication and Curettage Procedure Note: Discussed  procedure with patient/patient's guardian including risks and benefits as well as treatment alternatives. Risks of procedure include pain, bleeding, infection, post-inflammatory pigmentary alteration, scar, recurrence. Verbal consent obtained. Curettage and desiccation x 3 performed. Lesion size after primary curettage: 8mm cm. Petroleum jelly and bandage applied to wound. Patient tolerated procedure well. After-visit wound care instructions reviewed and provided in writing. F/u 3 months, PRN sooner.             Follow up for pending pathology.

## 2025-03-17 ENCOUNTER — RESULTS FOLLOW-UP (OUTPATIENT)
Facility: CLINIC | Age: 75
End: 2025-03-17

## 2025-03-17 LAB
FINAL PATHOLOGIC DIAGNOSIS: NORMAL
GROSS: NORMAL
Lab: NORMAL
MICROSCOPIC EXAM: NORMAL

## 2025-03-17 NOTE — PROGRESS NOTES
1. Skin, left proximal inner forearm, shave biopsy:  -SQUAMOUS CELL CARCINOMA, KERATOACANTHOMA TYPE, EXTENDING TO THE DEEP BIOPSY EDGE   2. Skin, left mid back, shave biopsy:  -SEBORRHEIC KERATOSIS, INFLAMED     Please call to discuss results / plan / schedule:   1) Low grade SCC called keratoacanthoma type. Treated at time of bx. No further treatment necessary.   2) Benign inflamed keratosis. No further treatment necessary.   3 month f/u skin check  Ty!

## 2025-03-24 DIAGNOSIS — Z00.00 ENCOUNTER FOR MEDICARE ANNUAL WELLNESS EXAM: ICD-10-CM

## 2025-04-07 ENCOUNTER — LAB VISIT (OUTPATIENT)
Dept: LAB | Facility: HOSPITAL | Age: 75
End: 2025-04-07
Attending: FAMILY MEDICINE
Payer: MEDICARE

## 2025-04-07 DIAGNOSIS — E78.5 HYPERLIPIDEMIA, UNSPECIFIED HYPERLIPIDEMIA TYPE: ICD-10-CM

## 2025-04-07 DIAGNOSIS — I10 ESSENTIAL HYPERTENSION: ICD-10-CM

## 2025-04-07 LAB
ABSOLUTE EOSINOPHIL (OHS): 0.38 K/UL
ABSOLUTE MONOCYTE (OHS): 0.55 K/UL (ref 0.3–1)
ABSOLUTE NEUTROPHIL COUNT (OHS): 6.32 K/UL (ref 1.8–7.7)
ALBUMIN SERPL BCP-MCNC: 3.9 G/DL (ref 3.5–5.2)
ALP SERPL-CCNC: 59 UNIT/L (ref 40–150)
ALT SERPL W/O P-5'-P-CCNC: 15 UNIT/L (ref 10–44)
ANION GAP (OHS): 7 MMOL/L (ref 8–16)
AST SERPL-CCNC: 20 UNIT/L (ref 11–45)
BASOPHILS # BLD AUTO: 0.05 K/UL
BASOPHILS NFR BLD AUTO: 0.5 %
BILIRUB SERPL-MCNC: 0.4 MG/DL (ref 0.1–1)
BUN SERPL-MCNC: 20 MG/DL (ref 8–23)
CALCIUM SERPL-MCNC: 9.7 MG/DL (ref 8.7–10.5)
CHLORIDE SERPL-SCNC: 106 MMOL/L (ref 95–110)
CHOLEST SERPL-MCNC: 184 MG/DL (ref 120–199)
CHOLEST/HDLC SERPL: 2.6 {RATIO} (ref 2–5)
CO2 SERPL-SCNC: 29 MMOL/L (ref 23–29)
CREAT SERPL-MCNC: 0.8 MG/DL (ref 0.5–1.4)
ERYTHROCYTE [DISTWIDTH] IN BLOOD BY AUTOMATED COUNT: 12.6 % (ref 11.5–14.5)
GFR SERPLBLD CREATININE-BSD FMLA CKD-EPI: >60 ML/MIN/1.73/M2
GLUCOSE SERPL-MCNC: 102 MG/DL (ref 70–110)
HCT VFR BLD AUTO: 46.2 % (ref 37–48.5)
HDLC SERPL-MCNC: 71 MG/DL (ref 40–75)
HDLC SERPL: 38.6 % (ref 20–50)
HGB BLD-MCNC: 14.8 GM/DL (ref 12–16)
IMM GRANULOCYTES # BLD AUTO: 0.02 K/UL (ref 0–0.04)
IMM GRANULOCYTES NFR BLD AUTO: 0.2 % (ref 0–0.5)
LDLC SERPL CALC-MCNC: 79.6 MG/DL (ref 63–159)
LYMPHOCYTES # BLD AUTO: 1.84 K/UL (ref 1–4.8)
MCH RBC QN AUTO: 30.4 PG (ref 27–31)
MCHC RBC AUTO-ENTMCNC: 32 G/DL (ref 32–36)
MCV RBC AUTO: 95 FL (ref 82–98)
NONHDLC SERPL-MCNC: 113 MG/DL
NUCLEATED RBC (/100WBC) (OHS): 0 /100 WBC
PLATELET # BLD AUTO: 220 K/UL (ref 150–450)
PMV BLD AUTO: 11.3 FL (ref 9.2–12.9)
POTASSIUM SERPL-SCNC: 3.8 MMOL/L (ref 3.5–5.1)
PROT SERPL-MCNC: 7.3 GM/DL (ref 6–8.4)
RBC # BLD AUTO: 4.87 M/UL (ref 4–5.4)
RELATIVE EOSINOPHIL (OHS): 4.1 %
RELATIVE LYMPHOCYTE (OHS): 20.1 % (ref 18–48)
RELATIVE MONOCYTE (OHS): 6 % (ref 4–15)
RELATIVE NEUTROPHIL (OHS): 69.1 % (ref 38–73)
SODIUM SERPL-SCNC: 142 MMOL/L (ref 136–145)
TRIGL SERPL-MCNC: 167 MG/DL (ref 30–150)
WBC # BLD AUTO: 9.16 K/UL (ref 3.9–12.7)

## 2025-04-07 PROCEDURE — 80053 COMPREHEN METABOLIC PANEL: CPT

## 2025-04-07 PROCEDURE — 85025 COMPLETE CBC W/AUTO DIFF WBC: CPT

## 2025-04-07 PROCEDURE — 36415 COLL VENOUS BLD VENIPUNCTURE: CPT | Mod: PN

## 2025-04-07 PROCEDURE — 80061 LIPID PANEL: CPT

## 2025-04-14 ENCOUNTER — OFFICE VISIT (OUTPATIENT)
Dept: PRIMARY CARE CLINIC | Facility: CLINIC | Age: 75
End: 2025-04-14
Payer: MEDICARE

## 2025-04-14 VITALS
WEIGHT: 173.81 LBS | HEIGHT: 61 IN | DIASTOLIC BLOOD PRESSURE: 76 MMHG | OXYGEN SATURATION: 98 % | BODY MASS INDEX: 32.82 KG/M2 | SYSTOLIC BLOOD PRESSURE: 136 MMHG | HEART RATE: 85 BPM

## 2025-04-14 DIAGNOSIS — E78.5 HYPERLIPIDEMIA, UNSPECIFIED HYPERLIPIDEMIA TYPE: Chronic | ICD-10-CM

## 2025-04-14 DIAGNOSIS — Z12.11 COLON CANCER SCREENING: ICD-10-CM

## 2025-04-14 DIAGNOSIS — R71.8 ELEVATED HEMATOCRIT: ICD-10-CM

## 2025-04-14 DIAGNOSIS — I10 ESSENTIAL HYPERTENSION: Primary | Chronic | ICD-10-CM

## 2025-04-14 PROCEDURE — 3288F FALL RISK ASSESSMENT DOCD: CPT | Mod: CPTII,S$GLB,, | Performed by: FAMILY MEDICINE

## 2025-04-14 PROCEDURE — 99214 OFFICE O/P EST MOD 30 MIN: CPT | Mod: S$GLB,,, | Performed by: FAMILY MEDICINE

## 2025-04-14 PROCEDURE — 1126F AMNT PAIN NOTED NONE PRSNT: CPT | Mod: CPTII,S$GLB,, | Performed by: FAMILY MEDICINE

## 2025-04-14 PROCEDURE — 4010F ACE/ARB THERAPY RXD/TAKEN: CPT | Mod: CPTII,S$GLB,, | Performed by: FAMILY MEDICINE

## 2025-04-14 PROCEDURE — 1101F PT FALLS ASSESS-DOCD LE1/YR: CPT | Mod: CPTII,S$GLB,, | Performed by: FAMILY MEDICINE

## 2025-04-14 PROCEDURE — 3008F BODY MASS INDEX DOCD: CPT | Mod: CPTII,S$GLB,, | Performed by: FAMILY MEDICINE

## 2025-04-14 PROCEDURE — 99999 PR PBB SHADOW E&M-EST. PATIENT-LVL IV: CPT | Mod: PBBFAC,,, | Performed by: FAMILY MEDICINE

## 2025-04-14 PROCEDURE — 1158F ADVNC CARE PLAN TLK DOCD: CPT | Mod: CPTII,S$GLB,, | Performed by: FAMILY MEDICINE

## 2025-04-14 PROCEDURE — 3078F DIAST BP <80 MM HG: CPT | Mod: CPTII,S$GLB,, | Performed by: FAMILY MEDICINE

## 2025-04-14 PROCEDURE — 1159F MED LIST DOCD IN RCRD: CPT | Mod: CPTII,S$GLB,, | Performed by: FAMILY MEDICINE

## 2025-04-14 PROCEDURE — 3075F SYST BP GE 130 - 139MM HG: CPT | Mod: CPTII,S$GLB,, | Performed by: FAMILY MEDICINE

## 2025-04-14 PROCEDURE — 1160F RVW MEDS BY RX/DR IN RCRD: CPT | Mod: CPTII,S$GLB,, | Performed by: FAMILY MEDICINE

## 2025-04-14 NOTE — PROGRESS NOTES
Primary Care Provider Appointment   [facility] [place]       Patient ID: Katy Yarbrough is a 74 y.o. female.    ASSESSMENT/PLAN by Problem List:    Assessment & Plan    IMPRESSION:  - Assessed HTN management; noted recent elevated readings but improved with medication adjustments.  - Evaluated HLD; recent triglycerides increased despite atorvastatin therapy.  - Reviewed hematology/oncology consult for elevated hematocrit; workup for PCV negative, diagnosed with secondary erythrocytosis.  - Discussed mammogram recommendation, patient declined.  - Discussed the slight elevation in glucose and its implications.  - colon cancer screening.  Patient declines colonoscopy she is agreeable to fecal test    HYPERTENSION:  - Educated the patient on salt sensitivity and its impact on blood pressure, and the importance of relaxation before measurements.  - Instructed the patient to monitor blood pressure at home 2 times per week, ensuring to sit and relax for a few minutes before taking readings.  - Noted recent elevated blood pressure readings, including one instance of 185/high that caused concern.  - Recent readings have been in the 130s over 80s.  - Measured blood pressure today at 134/76, which was very good.  - Confirmed kidney function and electrolytes are good based on recent labs.  - Continued amlodipine 5 mg daily, chlorthalidone 25 mg daily, and olmesartan 20 mg daily.  - Advised the patient to limit salt intake, especially due to potential salt sensitivity.  - Recommend limiting salt intake to help manage BP.    HYPERLIPIDEMIA:  - Continued atorvastatin 40 mg daily.  - Noted recent labs shows increased cholesterol levels, particularly triglycerides.  - Discussed options of either increasing medication dose or focusing on lifestyle changes to address increased cholesterol levels.  - Recommend improving nutrition with a Mediterranean-style diet and regular exercise.  - Recommend reducing  intake of sugars, sweets, and foods high in saturated fat (e.g., animal products, butter) to address elevated triglycerides and cholesterol.    SECONDARY ERYTHROCYTOSIS:  - Noted patient's history of elevated hematocrit and diagnosis of secondary erythrocytosis after workup for PCV was negative.  - Observed improvement in most recent CBC.  - Instructed the patient to continue follow-up with hematology oncology every 6 months as per their recommendations.    FOLLOW-UP:  - Scheduled follow up in 3-4 months to monitor blood pressure.  - Scheduled to recheck cholesterol levels in 6 months.         CODING, ORDERS, AND ADDITIONAL DOCUMENTATION RELATED TO THIS ENCOUNTER:  (note that the diagnoses and clinical summaries above were generated with the assistance of [employer] software and represents my assessment and plan.  This software does not always generate the precise nor most specific diagnosis codes.  Therefore the specific diagnosis codes and orders for billing purposes are detailed below along with any additional documentation if needed)      1. Essential hypertension    2. Hyperlipidemia, unspecified hyperlipidemia type    3. Elevated hematocrit    4. Colon cancer screening  -     Cologuard Screening (Multitarget Stool DNA); Future; Expected date: 04/14/2025           Follow Up:  Three months blood pressure follow-up.  Plan for labs in six-months      Advance Care Planning     Date: 04/14/2025    ACP Reviewed/No Changes  Voluntary advance care planning discussion had today with patient. Previously completed LW in electronic medical record is current, no changes made.    Living will on file.  Patient has previously declined to complete HPA  A total of less than five min was spent on advance care planning, goals of care discussion, emotional support, formulating and communicating prognosis and exploring burden/benefit of various approaches of treatment. This discussion occurred on a fully voluntary basis with  the verbal consent of the patient and/or family.               Subjective:       HPI    Patient is a/an 74 y.o.  female     For complete problem list, past medical history, surgical history, social history, etc., see appropriate section in the electronic medical record    History of Present Illness    HPI:  Ms. Yarbrough presents for routine follow-up and to discuss recent lab results, with a focus on hypertension management. Ms. Yarbrough returns for routine follow-up and to discuss recent lab results, primarily focusing on hypertension management. She had a recent episode of significantly elevated blood pressure, with systolic readings as high as 185 mmHg, along with a sensation of pressure and headaches. This prompted her to seek evaluation at the emergency room, where various tests were performed, including an EKG. She had consumed boiled shrimp the day before, which she believed might have contributed to the elevated blood pressure due to high salt content.    Following this incident, she scheduled an appointment with Magda (presumably a healthcare provider). Magda adjusted her medications, changing her diuretic to chlorthalidone and later adding olmesartan. Since these medication changes, her blood pressure has been ranging in the 130s/80s mmHg, with occasional diastolic readings in the mid-80s mmHg, reaching 87 mmHg on one occasion. She has been monitoring and recording her blood pressure at home.    Her most recent in-office blood pressure reading was 134/76 mmHg. She acknowledges a craving for salt but attempts to limit her intake.    Regarding her hyperlipidemia, she is aware that her recent triglycerides have increased. She is currently taking atorvastatin 40 mg for management.    She also has a history of elevated hematocrit, for which she has consulted with hematology oncology. She was diagnosed with secondary erythrocytosis after a negative workup for polycythemia vera (PCV). She continues to follow up with  hematology oncology every 6 months as recommended.    She denies any current headaches, difficulty with medications, or side effects from her current medications. She also denies detecting any unusual breast changes, lumps, bumps, nipple changes, drainage, or swollen lymph nodes under her axillas.    MEDICATIONS:  Ms. Yarbrough is on Amlodipine 5 mg, Chlorthalidone 25 mg, and Olmesartan 20 mg daily for hypertension. She is also taking Atorvastatin 40 mg daily for hyperlipidemia. Chlorthalidone 25 mg daily was changed from hydrochlorothiazide by Magda (provider) due to high blood pressure. Olmesartan was added by Magda (provider) due to persistently high blood pressure after the initial medication change.    MEDICAL HISTORY:  Ms. Yarbrough has a history of hypertension and hyperlipidemia. She also has a history of elevated hematocrit. She was diagnosed with secondary erythrocytosis after a workup for PCV was negative.    TEST RESULTS:  Ms. Yarbrough's recent CBC has shown improvement. Her kidney function and electrolytes were checked last week and the results were good. Her blood sugar was also tested last week, showing a slightly elevated level of 102. The cholesterol test from last week revealed an increase, particularly in triglycerides. Ms. Yarbrough underwent an EKG during a recent ER visit.      ROS:  General: +unusual cravings  Neurological: -headache       Review of Systems   Constitutional:  Negative for activity change.   Respiratory: Negative.  Negative for shortness of breath.    Cardiovascular: Negative.  Negative for chest pain.   Gastrointestinal: Negative.    Musculoskeletal: Negative.    Neurological: Negative.    Psychiatric/Behavioral:  Negative for dysphoric mood. The patient is not nervous/anxious.        Objective     Physical Exam  Vitals reviewed.   Constitutional:       General: She is not in acute distress.     Appearance: She is well-developed. She is not ill-appearing.   HENT:      Head: Normocephalic and  "atraumatic.   Eyes:      General: No scleral icterus.     Conjunctiva/sclera: Conjunctivae normal.   Cardiovascular:      Rate and Rhythm: Normal rate and regular rhythm.      Heart sounds: Normal heart sounds. No murmur heard.  Pulmonary:      Effort: Pulmonary effort is normal. No respiratory distress.      Breath sounds: Normal breath sounds. No wheezing or rales.   Skin:     General: Skin is dry.      Findings: No rash.   Neurological:      Mental Status: She is alert and oriented to person, place, and time.   Psychiatric:         Behavior: Behavior normal.       Vitals:    04/14/25 0913   BP: 136/76   BP Location: Left arm   Patient Position: Sitting   Pulse: 85   SpO2: 98%   Weight: 78.8 kg (173 lb 13.3 oz)   Height: 5' 1" (1.549 m)     Physical Exam                This note was generated with the assistance of ambient listening technology. Verbal consent was obtained by the patient and accompanying visitor(s) for the recording of patient appointment to facilitate this note. I attest to having reviewed and edited the generated note for accuracy, though some syntax or spelling errors may persist. Please contact the author of this note for any clarification.  Parts of the note were also generated with voice recognition software.  Occasionally this software will misinterpreted words or phrases    "

## 2025-06-12 ENCOUNTER — OFFICE VISIT (OUTPATIENT)
Facility: CLINIC | Age: 75
End: 2025-06-12
Payer: MEDICARE

## 2025-06-12 VITALS — BODY MASS INDEX: 31.28 KG/M2 | HEIGHT: 62 IN | WEIGHT: 170 LBS

## 2025-06-12 DIAGNOSIS — D18.01 CHERRY ANGIOMA: ICD-10-CM

## 2025-06-12 DIAGNOSIS — Z85.828 HISTORY OF NONMELANOMA SKIN CANCER: ICD-10-CM

## 2025-06-12 DIAGNOSIS — D22.9 MULTIPLE BENIGN NEVI: Primary | ICD-10-CM

## 2025-06-12 DIAGNOSIS — L81.4 LENTIGINES: ICD-10-CM

## 2025-06-12 DIAGNOSIS — L91.8 ACROCHORDON: ICD-10-CM

## 2025-06-12 DIAGNOSIS — Z12.83 SCREENING FOR SKIN CANCER: ICD-10-CM

## 2025-06-12 DIAGNOSIS — L57.0 AK (ACTINIC KERATOSIS): ICD-10-CM

## 2025-06-12 DIAGNOSIS — L82.1 SK (SEBORRHEIC KERATOSIS): ICD-10-CM

## 2025-06-12 PROCEDURE — 99999 PR PBB SHADOW E&M-EST. PATIENT-LVL III: CPT | Mod: PBBFAC,,, | Performed by: DERMATOLOGY

## 2025-06-12 NOTE — PATIENT INSTRUCTIONS
CRYOSURGERY      Your doctor has used a method called cryosurgery to treat your skin condition. Cryosurgery refers to the use of very cold substances to treat a variety of skin conditions such as warts, pre-skin cancers, molluscum contagiosum, sun spots, and several benign growths. The substance we use in cryosurgery is liquid nitrogen and is so cold (-195 degrees Celsius) that is burns when administered.     Following treatment in the office, the skin may immediately burn and become red. You may find the area around the lesion is affected as well. It is sometimes necessary to treat not only the lesion, but a small area of the surrounding normal skin to achieve a good response.     A blister, and even a blood filled blister, may form after treatment.   This is a normal response. If the blister is painful, it is acceptable to sterilize a needle and with rubbing alcohol and gently pop the blister. It is important that you gently wash the area with soap and warm water as the blister fluid may contain wart virus if a wart was treated. Do no remove the roof of the blister.     The area treated can take anywhere from 1-3 weeks to heal. Healing time depends on the kind skin lesion treated, the location, and how aggressively the lesion was treated. It is recommended that the areas treated are covered with Vaseline or bacitracin ointment and a band-aid. If a band-aid is not practical, just ointment applied several times per day will do. Keeping these areas moist will speed the healing time.    Treatment with liquid nitrogen can leave a scar. In dark skin, it may be a light or dark scar, in light skin it may be a white or pink scar. These will generally fade with time, but may never go away completely.     If you have any concerns after your treatment, please feel free to call the office.       [address] [place] [address]/ [phone] FAX/ [email or web address] What Are the Symptoms of Skin  Cancer?  A change in your skin is the most common sign of skin cancer. This could be a new growth, a sore that doesnt heal, or a change in a mole. Not all skin cancers look the same.    For melanoma specifically, a simple way to remember the warning signs is to remember the A-B-C-D-Es of melanoma--    A stands for asymmetrical. Does the mole or spot have an irregular shape with two parts that look very different?  B stands for border. Is the border irregular or jagged?  C is for color. Is the color uneven?  D is for diameter. Is the mole or spot larger than the size of a pea?  E is for evolving. Has the mole or spot changed during the past few weeks or months?    Talk to your doctor if you notice changes in your skin such as a new growth, a sore that doesnt heal, a change in an old growth, or any of the A-B-C-D-Es of melanoma    What Can I Do to Reduce My Risk of Skin Cancer?  Protection from ultraviolet (UV) radiation is important all year, not just during the summer or at the beach. UV rays from the sun can reach you on cloudy and hazy days, not just on bright and ame days. UV rays also reflect off of surfaces like water, cement, sand, and snow. Indoor tanning (using a tanning bed, garcia, or sunlamp to get tan) exposes users to UV radiation.    The hours between 10 a.m. and 4 p.m. Daylight Saving Time (9 a.m. to 3 p.m. standard time) are the most hazardous for UV exposure outdoors in the continental [place]. UV rays from sunlight are the greatest during the late spring and early summer in [place].    CDC recommends easy options for protection from UV radiation--    Stay in the shade or indoors, especially during midday hours.  Wear clothing that covers your arms and legs.  Wear a hat with a wide brim to shade your face, head, ears, and neck.  Wear sunglasses that wrap around and block both UVA and UVB rays.  Use sunscreen with a sun protection factor (SPF) of 30 or higher, and both UVA  and UVB (broad spectrum) protection.  Avoid indoor tanning.    Adapted from https://www.cdc.gov/cancer/skin/basic_info/

## 2025-06-12 NOTE — PROGRESS NOTES
Subjective:       Patient ID:  Katy Yarbrough is a 74 y.o. female who presents for   Chief Complaint   Patient presents with    Skin Check     ubsc     HPI    Established patient.    Here today for upper body skin exam.    No complaints.      3/2025  1. Skin, left proximal inner forearm, shave biopsy:  -SQUAMOUS CELL CARCINOMA, KERATOACANTHOMA TYPE, EXTENDING TO THE DEEP BIOPSY EDGE   2. Skin, left mid back, shave biopsy:  -SEBORRHEIC KERATOSIS, INFLAMED     +NMSC  SCC-KA at L proximal inner forearm s/p EDC 3/2025    +AK   Cryo, Efudex field therapy (forehead late 2024)    Review of Systems     Objective:    Physical Exam   Constitutional: She appears well-developed and well-nourished. She is cooperative. No distress.   HENT:   Head: Normocephalic and atraumatic.   Eyes: Lids are normal. Lids are normal.  Right conjunctiva is not injected. Left conjunctiva is not injected. No conjunctival no injection.   Pulmonary/Chest: No respiratory distress.   Musculoskeletal:      Right lower leg: No edema.      Left lower leg: No edema.   Neurological: She is alert and oriented to person, place, and time. She is not disoriented.   Psychiatric: She has a normal mood and affect. Her speech is normal and behavior is normal. Mood, affect, judgment and thought content normal.   Skin:   Areas Examined (abnormalities noted in diagram):   Scalp / Hair Palpated and Inspected  Head / Face Inspection Performed  Neck Inspection Performed  Chest / Axilla Inspection Performed  Abdomen Inspection Performed  Back Inspection Performed  RUE Inspected  LUE Inspection Performed  Nails and Digits Inspection Performed                   Diagram Legend     Erythematous scaling macule/papule c/w actinic keratosis       Vascular papule c/w angioma      Pigmented verrucoid papule/plaque c/w seborrheic keratosis      Yellow umbilicated papule c/w sebaceous hyperplasia      Irregularly shaped tan macule c/w lentigo     1-2 mm smooth white papules  consistent with Milia      Movable subcutaneous cyst with punctum c/w epidermal inclusion cyst      Subcutaneous movable cyst c/w pilar cyst      Firm pink to brown papule c/w dermatofibroma      Pedunculated fleshy papule(s) c/w skin tag(s)      Evenly pigmented macule c/w junctional nevus     Mildly variegated pigmented, slightly irregular-bordered macule c/w mildly atypical nevus      Flesh colored to evenly pigmented papule c/w intradermal nevus       Pink pearly papule/plaque c/w basal cell carcinoma      Erythematous hyperkeratotic cursted plaque c/w SCC      Surgical scar with no sign of skin cancer recurrence      Open and closed comedones      Inflammatory papules and pustules      Verrucoid papule consistent consistent with wart     Erythematous eczematous patches and plaques     Dystrophic onycholytic nail with subungual debris c/w onychomycosis     Umbilicated papule    Erythematous-base heme-crusted tan verrucoid plaque consistent with inflamed seborrheic keratosis     Erythematous Silvery Scaling Plaque c/w Psoriasis     See annotation    Assessment / Plan:         AK (actinic keratosis)  - Discussed diagnosis, etiology, and precancerous nature of condition.   - Cryosurgery Procedure Note: Discussed procedure with patient/patient's guardian including risks and benefits as well as treatment alternatives. Risks of procedure include pain, itching, swelling, redness, blistering, crusting, wound formation, post-inflammatory pigmentary alteration, scar, recurrence. Verbal consent obtained. LN2 cryosurgery performed to 2 lesion(s). Patient tolerated procedure well. After-visit wound care instructions reviewed and provided in writing.      Multiple benign nevi  - Discussed diagnosis, etiology, and benign-nature of condition.  - Reassured; no lesions suspicious for malignancy noted on exam today.   - Recommended routine self examination of skin. Discussed the ABCDEs of melanoma and ugly duckling sign.   -  Recommended daily sun protection, including the use of OTC broad-spectrum sunscreen (SPF 30 or greater) and sun-protective clothing.      Lentigines  - Benign; reassured treatment not necessary.   - Recommended daily sun protection, including the use of OTC broad-spectrum sunscreen (SPF 30 or greater) and sun-protective clothing.       SK (seborrheic keratosis)  Acrochordon  Cherry angioma  - Benign; reassured treatment not necessary.      History of nonmelanoma skin cancer  Screening for skin cancer  - Upper body skin examination performed today.  - Findings listed above.   - Sites of prior malignancy examined - no concern for recurrence today.    - Recommended routine self examination of skin.    - Recommended daily sun protection, including the use of OTC broad-spectrum sunscreen (SPF 30 or greater) and sun-protective clothing.         Follow up in about 6 months (around 12/12/2025) for tbse, sooner prn.

## 2025-07-15 ENCOUNTER — OFFICE VISIT (OUTPATIENT)
Dept: PRIMARY CARE CLINIC | Facility: CLINIC | Age: 75
End: 2025-07-15
Payer: MEDICARE

## 2025-07-15 VITALS
DIASTOLIC BLOOD PRESSURE: 88 MMHG | BODY MASS INDEX: 31.9 KG/M2 | WEIGHT: 173.38 LBS | OXYGEN SATURATION: 98 % | SYSTOLIC BLOOD PRESSURE: 130 MMHG | HEIGHT: 62 IN | HEART RATE: 78 BPM

## 2025-07-15 DIAGNOSIS — I10 ESSENTIAL HYPERTENSION: Primary | Chronic | ICD-10-CM

## 2025-07-15 DIAGNOSIS — E78.5 HYPERLIPIDEMIA, UNSPECIFIED HYPERLIPIDEMIA TYPE: Chronic | ICD-10-CM

## 2025-07-15 PROCEDURE — 99999 PR PBB SHADOW E&M-EST. PATIENT-LVL IV: CPT | Mod: PBBFAC,,, | Performed by: FAMILY MEDICINE

## 2025-07-15 NOTE — PROGRESS NOTES
Primary Care Provider Appointment   [facility]       Patient ID: Katy Yarbrough is a 74 y.o. female.    ASSESSMENT/PLAN by Problem List:    Assessment & Plan    IMPRESSION:  - Assessed BP, noting it as borderline at 130/88.  - Considered anxiety and white coat HTN as potential factors in elevated BP readings during office visits.  - Evaluated current BP management strategy and determined need for lifestyle modifications before considering medication adjustments.  - Reviewed recent dermatology follow-up for previously identified cancerous growth.    BIRD MITE INFESTATION  Presumed.  Patient reports that she and her sister developed what looked like small bites on the upper limbs and torso after they removed a bird's nest from their front porch.  She went to urgent care and was given a prescription for what sounds like Elimite.  She reports symptoms are improving.  No new bites or lesions and pruritus much better.  I do not see any signs of active infection and if this infestation was truly bird mites, these do not display a risk of spreading to other individuals as they can not complete their life cycle on humans.  So it sounds like proper cleaning an initial treatment should be adequate.  However she will let me know if symptoms return.    HYPERTENSION:  - Monitored the patient's blood pressure, which was approximately 130/80 at home but 130/88 at the clinic.  - Evaluated that blood pressure is borderline, with clinic readings slightly elevated due to anxiety.  - Discussed DASH diet and its potential benefits for blood pressure management, including potassium-rich foods and their role in blood pressure regulation.  - Explained use of salt substitutes containing potassium chloride (e.g., Mrs. Quintero, No Salt) as alternative to regular salt.  - Recommend limiting sodium intake, increasing water intake to help dilute sodium, and engaging in light exercise such as walking or using a  stationary bike.  - Instructed the patient to resume regular blood pressure monitoring at home 2-3 times per week and maintain a log.  - Ordered labs for next visit, including lipid panel.    HISTORY OF SKIN CANCER:  - Monitored the patient's history of skin cancer, which was previously identified through biopsy.  - Advised the patient to continue routine dermatology appointments for ongoing surveillance.    FOLLOW-UP:  - Follow up in the fall if no issues arise sooner.  - Contact the office if any concerns develop before the next scheduled appointment.           ASSOCIATED ORDERS:  1. Essential hypertension  -     Lipid Panel; Future; Expected date: 07/15/2025  -     Comprehensive Metabolic Panel; Future; Expected date: 07/15/2025  -     CBC Auto Differential; Future; Expected date: 07/15/2025    2. Hyperlipidemia, unspecified hyperlipidemia type  -     Lipid Panel; Future; Expected date: 07/15/2025  -     Comprehensive Metabolic Panel; Future; Expected date: 07/15/2025  -     CBC Auto Differential; Future; Expected date: 07/15/2025           Follow Up:  Three months    Subjective:       Follow-up  Associated symptoms include a rash (improving). Pertinent negatives include no chest pain.       Patient is a/an 74 y.o.  female     For complete problem list, past medical history, surgical history, social history, etc., see appropriate section in the electronic medical record    History of Present Illness    CHIEF COMPLAINT:  Katy presents today for follow up of bird mite infestation.    BIRD MITE INFESTATION:  She reports improvement of multiple bites located underneath her arm that were initially bright red and inflamed. She was treated with Elimite cream at urgent care, which was applied to the body and left on for eight hours before washing off. The existing bites appear quieter and less inflamed. She denies current active infestation or new bite development.    DERMATOLOGICAL HISTORY:  She has history of a  recurring growth on her nose that required multiple removals and biopsies by her dermatologist, with one biopsy revealing a cancerous lesion. She believes the growth has resolved and continues regular dermatological surveillance.    HYPERTENSION:  She has history of elevated blood pressure. Recent urgent care visit showed elevated blood pressure, which she attributes to being aggravated. Home blood pressure readings have been around 130/80. She reports anxiety about blood pressure measurements, particularly during medical appointments. She monitors blood pressure at home with variable consistency.    MEDICATIONS:  All current medications are managed through mail-order pharmacy with automatic refill processing. No current medication concerns or changes noted.      ROS:  Cardiovascular: -chest pain  Respiratory: -shortness of breath  Skin: -lesion  Psychiatric: +anxiety       Review of Systems   Constitutional:  Negative for activity change and unexpected weight change.   Respiratory: Negative.  Negative for shortness of breath.    Cardiovascular: Negative.  Negative for chest pain.   Gastrointestinal: Negative.    Musculoskeletal: Negative.    Skin:  Positive for rash (improving).   Neurological: Negative.        Objective     Physical Exam  Vitals reviewed.   Constitutional:       General: She is not in acute distress.     Appearance: She is well-developed. She is not ill-appearing.   HENT:      Head: Normocephalic and atraumatic.   Eyes:      General: No scleral icterus.     Conjunctiva/sclera: Conjunctivae normal.   Cardiovascular:      Rate and Rhythm: Normal rate and regular rhythm.      Heart sounds: Normal heart sounds. No murmur heard.  Pulmonary:      Effort: Pulmonary effort is normal. No respiratory distress.      Breath sounds: Normal breath sounds. No wheezing or rales.   Skin:     General: Skin is dry.      Findings: No rash.      Comments: Few scattered papules on the upper arms with mild excoriation.   "No pustules.    Neurological:      Mental Status: She is alert and oriented to person, place, and time.   Psychiatric:         Behavior: Behavior normal.       Vitals:    07/15/25 1046   BP: 130/88   Patient Position: Sitting   Pulse: 78   SpO2: 98%   Weight: 78.7 kg (173 lb 6.3 oz)   Height: 5' 2" (1.575 m)     Physical Exam                    This note was generated with the assistance of ambient listening technology. Verbal consent was obtained by the patient and accompanying visitor(s) for the recording of patient appointment to facilitate this note. I attest to having reviewed and edited the generated note for accuracy, though some syntax or spelling errors may persist. Please contact the author of this note for any clarification.  Parts of the note were also generated with voice recognition software.  Occasionally this software will misinterpreted words or phrases    "

## 2025-07-23 DIAGNOSIS — E78.5 HYPERLIPIDEMIA, UNSPECIFIED HYPERLIPIDEMIA TYPE: ICD-10-CM

## 2025-07-23 DIAGNOSIS — G47.00 INSOMNIA, UNSPECIFIED TYPE: Primary | ICD-10-CM

## 2025-07-23 DIAGNOSIS — I10 ESSENTIAL HYPERTENSION: ICD-10-CM

## 2025-07-24 RX ORDER — ATORVASTATIN CALCIUM 40 MG/1
40 TABLET, FILM COATED ORAL NIGHTLY
Qty: 90 TABLET | Refills: 3 | Status: SHIPPED | OUTPATIENT
Start: 2025-07-24

## 2025-07-24 RX ORDER — AMLODIPINE BESYLATE 5 MG/1
5 TABLET ORAL
Qty: 90 TABLET | Refills: 3 | Status: SHIPPED | OUTPATIENT
Start: 2025-07-24

## 2025-07-24 RX ORDER — TRAZODONE HYDROCHLORIDE 50 MG/1
50 TABLET ORAL NIGHTLY
Qty: 90 TABLET | Refills: 3 | Status: SHIPPED | OUTPATIENT
Start: 2025-07-24

## 2025-07-31 RX ORDER — OLMESARTAN MEDOXOMIL 20 MG/1
20 TABLET ORAL
Qty: 100 TABLET | Refills: 3 | Status: SHIPPED | OUTPATIENT
Start: 2025-07-31

## 2025-08-20 RX ORDER — CHLORTHALIDONE 25 MG/1
25 TABLET ORAL DAILY
Qty: 90 TABLET | Refills: 3 | Status: SHIPPED | OUTPATIENT
Start: 2025-08-20